# Patient Record
Sex: MALE | Race: BLACK OR AFRICAN AMERICAN | NOT HISPANIC OR LATINO | Employment: FULL TIME | URBAN - METROPOLITAN AREA
[De-identification: names, ages, dates, MRNs, and addresses within clinical notes are randomized per-mention and may not be internally consistent; named-entity substitution may affect disease eponyms.]

---

## 2019-03-18 ENCOUNTER — OFFICE VISIT (OUTPATIENT)
Dept: INTERNAL MEDICINE | Facility: CLINIC | Age: 29
End: 2019-03-18
Payer: COMMERCIAL

## 2019-03-18 VITALS
WEIGHT: 181.69 LBS | DIASTOLIC BLOOD PRESSURE: 76 MMHG | HEART RATE: 60 BPM | SYSTOLIC BLOOD PRESSURE: 132 MMHG | OXYGEN SATURATION: 98 % | TEMPERATURE: 98 F

## 2019-03-18 DIAGNOSIS — E83.52 HYPERCALCEMIA: ICD-10-CM

## 2019-03-18 DIAGNOSIS — B00.89 HERPES SIMPLEX VIRUS TYPE 1 (HSV-1) DERMATITIS: ICD-10-CM

## 2019-03-18 DIAGNOSIS — R07.9 CHEST PAIN, EXERTIONAL: ICD-10-CM

## 2019-03-18 DIAGNOSIS — Z23 NEED FOR TDAP VACCINATION: ICD-10-CM

## 2019-03-18 DIAGNOSIS — Z13.220 SCREENING FOR LIPOID DISORDERS: ICD-10-CM

## 2019-03-18 DIAGNOSIS — D64.9 ANEMIA, UNSPECIFIED TYPE: ICD-10-CM

## 2019-03-18 DIAGNOSIS — Z13.29 THYROID DISORDER SCREEN: ICD-10-CM

## 2019-03-18 DIAGNOSIS — Z00.00 ROUTINE GENERAL MEDICAL EXAMINATION AT A HEALTH CARE FACILITY: Primary | ICD-10-CM

## 2019-03-18 PROCEDURE — 90715 TDAP VACCINE 7 YRS/> IM: CPT | Mod: S$GLB,,, | Performed by: FAMILY MEDICINE

## 2019-03-18 PROCEDURE — 99385 PREV VISIT NEW AGE 18-39: CPT | Mod: 25,S$GLB,, | Performed by: FAMILY MEDICINE

## 2019-03-18 PROCEDURE — 90686 FLU VACCINE (QUAD) GREATER THAN OR EQUAL TO 3YO PRESERVATIVE FREE IM: ICD-10-PCS | Mod: S$GLB,,, | Performed by: FAMILY MEDICINE

## 2019-03-18 PROCEDURE — 90471 FLU VACCINE (QUAD) GREATER THAN OR EQUAL TO 3YO PRESERVATIVE FREE IM: ICD-10-PCS | Mod: S$GLB,,, | Performed by: FAMILY MEDICINE

## 2019-03-18 PROCEDURE — 90472 IMMUNIZATION ADMIN EACH ADD: CPT | Mod: S$GLB,,, | Performed by: FAMILY MEDICINE

## 2019-03-18 PROCEDURE — 99385 PR PREVENTIVE VISIT,NEW,18-39: ICD-10-PCS | Mod: 25,S$GLB,, | Performed by: FAMILY MEDICINE

## 2019-03-18 PROCEDURE — 99999 PR PBB SHADOW E&M-NEW PATIENT-LVL III: ICD-10-PCS | Mod: PBBFAC,,, | Performed by: FAMILY MEDICINE

## 2019-03-18 PROCEDURE — 99999 PR PBB SHADOW E&M-NEW PATIENT-LVL III: CPT | Mod: PBBFAC,,, | Performed by: FAMILY MEDICINE

## 2019-03-18 PROCEDURE — 90472 TDAP VACCINE GREATER THAN OR EQUAL TO 7YO IM: ICD-10-PCS | Mod: S$GLB,,, | Performed by: FAMILY MEDICINE

## 2019-03-18 PROCEDURE — 90715 TDAP VACCINE GREATER THAN OR EQUAL TO 7YO IM: ICD-10-PCS | Mod: S$GLB,,, | Performed by: FAMILY MEDICINE

## 2019-03-18 PROCEDURE — 90686 IIV4 VACC NO PRSV 0.5 ML IM: CPT | Mod: S$GLB,,, | Performed by: FAMILY MEDICINE

## 2019-03-18 PROCEDURE — 90471 IMMUNIZATION ADMIN: CPT | Mod: S$GLB,,, | Performed by: FAMILY MEDICINE

## 2019-03-18 RX ORDER — VALACYCLOVIR HYDROCHLORIDE 500 MG/1
500 TABLET, FILM COATED ORAL 2 TIMES DAILY
Qty: 6 TABLET | Refills: 2 | Status: SHIPPED | OUTPATIENT
Start: 2019-03-18 | End: 2022-02-03

## 2019-03-18 RX ORDER — FERROUS SULFATE 324(65)MG
325 TABLET, DELAYED RELEASE (ENTERIC COATED) ORAL DAILY
COMMUNITY
End: 2019-03-20

## 2019-03-18 NOTE — PROGRESS NOTES
Subjective:       Patient ID: Danisha Jones is a 28 y.o. male.    Chief Complaint: Establish Care and Rash    Patient presents to clinic today for establish care physical. He reports occasional sleep paralysis on waking since he was a child.     Review of Systems   Constitutional: Negative for activity change and unexpected weight change.   HENT: Negative for hearing loss, rhinorrhea and trouble swallowing.    Eyes: Negative for discharge and visual disturbance.   Respiratory: Negative for chest tightness and wheezing.    Cardiovascular: Positive for chest pain (reports with significant exertion; describes as a pinching). Negative for palpitations.   Gastrointestinal: Negative for blood in stool, constipation, diarrhea and vomiting.   Endocrine: Negative for polydipsia and polyuria.   Genitourinary: Negative for difficulty urinating, hematuria and urgency.   Musculoskeletal: Positive for arthralgias (intermittent; left shoulder pain with lifting) and neck pain (sometimes). Negative for joint swelling.   Skin: Positive for rash (has history of hsv rash on left side).   Neurological: Negative for weakness and headaches.   Psychiatric/Behavioral: Positive for dysphoric mood (situational; temporary). Negative for confusion.       Objective:      Physical Exam   Constitutional: He is oriented to person, place, and time. He appears well-developed and well-nourished. No distress.   HENT:   Head: Normocephalic and atraumatic.   Right Ear: Hearing, tympanic membrane, external ear and ear canal normal.   Left Ear: Hearing, tympanic membrane, external ear and ear canal normal.   Nose: Nose normal.   Mouth/Throat: Uvula is midline, oropharynx is clear and moist and mucous membranes are normal.   Eyes: Pupils are equal, round, and reactive to light. Conjunctivae, EOM and lids are normal. No scleral icterus.   Neck: Normal range of motion. Neck supple. No thyromegaly present.   Cardiovascular: Normal rate and regular rhythm.  Exam reveals no gallop and no friction rub.   No murmur heard.  Pulmonary/Chest: Effort normal and breath sounds normal. He has no wheezes. He has no rales.   Abdominal: Soft. Bowel sounds are normal. He exhibits no distension and no mass. There is no hepatosplenomegaly. There is no tenderness.   Musculoskeletal: Normal range of motion. He exhibits no edema or tenderness.   Lymphadenopathy:     He has no cervical adenopathy.   Neurological: He is alert and oriented to person, place, and time. No cranial nerve deficit. Coordination normal.   Reflex Scores:       Patellar reflexes are 2+ on the right side and 2+ on the left side.  Skin: Skin is warm and dry. No rash noted.   Psychiatric: He has a normal mood and affect.   Vitals reviewed.      Assessment:       1. Routine general medical examination at a health care facility    2. Chest pain, exertional    3. Herpes simplex virus type 1 (HSV-1) dermatitis    4. Need for Tdap vaccination    5. Screening for lipoid disorders    6. Thyroid disorder screen        Plan:     Problem List Items Addressed This Visit     Herpes simplex virus type 1 (HSV-1) dermatitis    Current Assessment & Plan     Valtrex prn         Chest pain, exertional    Relevant Orders    Ambulatory referral to Cardiology      Other Visit Diagnoses     Routine general medical examination at a health care facility    -  Primary    Relevant Orders    Comprehensive metabolic panel (Completed)    CBC auto differential (Completed)    Need for Tdap vaccination        Relevant Orders    Tdap Vaccine (Completed)    Screening for lipoid disorders        Relevant Orders    Lipid panel (Completed)    Thyroid disorder screen        Relevant Orders    TSH (Completed)          Health Maintenance reviewed/updated.

## 2019-03-20 ENCOUNTER — CLINICAL SUPPORT (OUTPATIENT)
Dept: CARDIOLOGY | Facility: CLINIC | Age: 29
End: 2019-03-20
Payer: COMMERCIAL

## 2019-03-20 ENCOUNTER — HOSPITAL ENCOUNTER (OUTPATIENT)
Dept: RADIOLOGY | Facility: HOSPITAL | Age: 29
Discharge: HOME | End: 2019-03-20
Attending: INTERNAL MEDICINE
Payer: COMMERCIAL

## 2019-03-20 ENCOUNTER — OFFICE VISIT (OUTPATIENT)
Dept: CARDIOLOGY | Facility: CLINIC | Age: 29
End: 2019-03-20
Payer: COMMERCIAL

## 2019-03-20 VITALS
BODY MASS INDEX: 24.52 KG/M2 | SYSTOLIC BLOOD PRESSURE: 146 MMHG | HEIGHT: 72 IN | DIASTOLIC BLOOD PRESSURE: 80 MMHG | HEART RATE: 60 BPM | WEIGHT: 181 LBS

## 2019-03-20 DIAGNOSIS — R07.89 OTHER CHEST PAIN: Primary | ICD-10-CM

## 2019-03-20 DIAGNOSIS — R07.89 OTHER CHEST PAIN: ICD-10-CM

## 2019-03-20 DIAGNOSIS — R07.9 CHEST PAIN, UNSPECIFIED TYPE: Primary | ICD-10-CM

## 2019-03-20 DIAGNOSIS — R07.9 CHEST PAIN, UNSPECIFIED TYPE: ICD-10-CM

## 2019-03-20 PROCEDURE — 71046 X-RAY EXAM CHEST 2 VIEWS: CPT | Mod: 26,,, | Performed by: RADIOLOGY

## 2019-03-20 PROCEDURE — 99999 PR PBB SHADOW E&M-EST. PATIENT-LVL III: CPT | Mod: PBBFAC,,, | Performed by: INTERNAL MEDICINE

## 2019-03-20 PROCEDURE — 71046 X-RAY EXAM CHEST 2 VIEWS: CPT | Mod: TC

## 2019-03-20 PROCEDURE — 93000 EKG 12-LEAD: ICD-10-PCS | Mod: S$GLB,,, | Performed by: INTERNAL MEDICINE

## 2019-03-20 PROCEDURE — 93000 ELECTROCARDIOGRAM COMPLETE: CPT | Mod: S$GLB,,, | Performed by: INTERNAL MEDICINE

## 2019-03-20 PROCEDURE — 99244 OFF/OP CNSLTJ NEW/EST MOD 40: CPT | Mod: S$GLB,,, | Performed by: INTERNAL MEDICINE

## 2019-03-20 PROCEDURE — 71046 XR CHEST PA AND LATERAL: ICD-10-PCS | Mod: 26,,, | Performed by: RADIOLOGY

## 2019-03-20 PROCEDURE — 99999 PR PBB SHADOW E&M-EST. PATIENT-LVL III: ICD-10-PCS | Mod: PBBFAC,,, | Performed by: INTERNAL MEDICINE

## 2019-03-20 PROCEDURE — 99244 PR OFFICE CONSULTATION,LEVEL IV: ICD-10-PCS | Mod: S$GLB,,, | Performed by: INTERNAL MEDICINE

## 2019-03-20 RX ORDER — IBUPROFEN 600 MG/1
600 TABLET ORAL EVERY 6 HOURS PRN
Qty: 60 TABLET | Refills: 0 | Status: SHIPPED | OUTPATIENT
Start: 2019-03-20 | End: 2019-07-29

## 2019-03-20 NOTE — LETTER
March 20, 2019      Stephanie Scott MD  [id number] [place] [address] [place] [address]           [facility]  [address]  [place] [address]  Phone: [phone]  Fax: [phone]          Patient: Danisha Jones   MR Number: [id number]   YOB: 1990   Date of Visit: 3/20/2019       Dear Dr. Stephanie Scott:    Thank you for referring Danisha Jones to me for evaluation. Attached you will find relevant portions of my assessment and plan of care.    If you have questions, please do not hesitate to call me. I look forward to following Danisha Jones along with you.    Sincerely,    Ba Bermudez MD    Enclosure  CC:  No Recipients    If you would like to receive this communication electronically, please contact [email or web address] or [phone] to request more information on [employer] access.    For providers and/or their staff who would like to refer a patient to [facility], please contact us through our one-stop-shop provider referral line, [facility], at [phone].    If you feel you have received this communication in error or would no longer like to receive these types of communications, please e-mail [email or web address]

## 2019-03-20 NOTE — PATIENT INSTRUCTIONS
Noncardiac Chest Pain    Based on your visit today, the healthcare provider doesnt know what is causing your chest pain. In most cases, people who come to the emergency department with chest pain dont have a problem with their heart. Instead, the pain is caused by other conditions. It's important for the healthcare team to be sure you are not having a life threatening cause for chest pain such as a heart attack, blood clot in the lungs, collapsed lung, ruptured esophagus, or tearing of the aorta. Once these major causes have been ruled out, you may have further evaluation for non-heart causes of chest pain. These may be problems with the lungs, muscles, bones, digestive tract, nerves, or mental health.  Lung problems  · Inflammation around the lungs (pleurisy)  · Collapsed lung (pneumothorax)  · Fluid around the lungs (pleural effusion)  · Lung cancer (a rare cause of chest pain)  Muscle or bone problems  · Inflamed cartilage between the ribs (costochondritis)  · Fibromyalgia  · Rheumatoid arthritis  · Chest wall strain  Digestive system problems  · Reflux  · Stomach ulcer  · Spasms of the esophagus  · Gall stones  · Gallbladder inflammation  Mental health conditions  · Panic or anxiety attacks  · Emotional distress  Your condition doesnt seem serious and your pain doesnt appear to be coming from your heart. But sometimes the signs of a serious problem take more time to appear. Watch for the warning signs listed below.  Home care  Follow these guidelines when caring for yourself at home:  · Rest today and avoid strenuous activity.  · Take any prescribed medicine as directed.  Follow-up care  Follow up with your healthcare provider, or as advised, if you dont start to feel better within 24 hours.  When to seek medical advice  Call your healthcare provider right away if any of these occur:  · A change in the type of pain. Call if it feels different, becomes more serious, lasts longer, or begins to spread into  your shoulder, arm, neck, jaw, or back.  · Shortness of breath  · You feel more pain when you breathe  · Cough with dark-colored mucus or blood  · Weakness, dizziness, or fainting  · Fever of 100.4ºF (38ºC) or higher, or as directed by your healthcare provider  · Swelling, pain, or redness in one leg  Date Last Reviewed: 12/1/2016  © 6817-1157 [employer]. [address] [place] [address], [place] [address]. All rights reserved. This information is not intended as a substitute for professional medical care. Always follow your healthcare professional's instructions.

## 2019-03-20 NOTE — PROGRESS NOTES
Subjective:   Patient ID:  Danisha Jones is a 28 y.o. male who presents for cardiac consult of Chest Pain (consult)      HPI  The patient came in today for cardiac consult of Chest Pain (consult)    Referring Physician: Stephanie Scott MD  Reason for consult: CP    3/20/19  Danisha Jones is a 28 y.o. male without significant PMHx presents for initial CV evaluation of chest pain.     He presented to Dr. Scott's office two days ago and complained of chest pain. He complains of exertional CP, started last year occurs 5 x week.   About the same as in the past feels like a pinching type. Occurs when he does free weights, does not feel with treadmill. Positional movements increase pain.     Patient feels no sob, no leg swelling, no PND, no palpitation, no dizziness, no syncope, no CNS symptoms.    Patient has fairly good exercise tolerance. Works as lab tech.     Patient is compliant with medications.    FH - no CV disease    ECG - NSR    Past Medical History:   Diagnosis Date    Anemia     Herpes simplex virus type 1 (HSV-1) dermatitis        History reviewed. No pertinent surgical history.    Social History     Tobacco Use    Smoking status: Never Smoker    Smokeless tobacco: Never Used   Substance Use Topics    Alcohol use: Yes     Frequency: 2-4 times a month     Drinks per session: 3 or 4     Binge frequency: Less than monthly    Drug use: No       Family History   Problem Relation Age of Onset    Anemia Mother     Diabetes Father     Hypertension Father     Anemia Sister     No Known Problems Brother     No Known Problems Sister        Patient's Medications   New Prescriptions    IBUPROFEN (ADVIL,MOTRIN) 600 MG TABLET    Take 1 tablet (600 mg total) by mouth every 6 (six) hours as needed for Other (Chest pain).   Previous Medications    VALACYCLOVIR (VALTREX) 500 MG TABLET    Take 1 tablet (500 mg total) by mouth 2 (two) times daily. Take 3 day course as needed. for 3 days   Modified  Medications    No medications on file   Discontinued Medications    FERROUS SULFATE 324 MG (65 MG IRON) TBEC    Take 325 mg by mouth once daily.       Review of Systems   Constitutional: Negative.    HENT: Negative.    Eyes: Negative.    Respiratory: Negative.    Cardiovascular: Positive for chest pain.   Gastrointestinal: Negative.    Genitourinary: Negative.    Musculoskeletal: Negative.    Skin: Negative.    Neurological: Negative.    Endo/Heme/Allergies: Negative.    Psychiatric/Behavioral: Negative.    All 12 systems otherwise negative.      Wt Readings from Last 3 Encounters:   03/20/19 82.1 kg (181 lb)   03/18/19 82.4 kg (181 lb 10.5 oz)     Temp Readings from Last 3 Encounters:   03/18/19 97.6 °F (36.4 °C) (Tympanic)     BP Readings from Last 3 Encounters:   03/20/19 (!) 146/80   03/18/19 132/76     Pulse Readings from Last 3 Encounters:   03/20/19 60   03/18/19 60       BP (!) 146/80 (BP Method: Large (Manual))   Pulse 60   Ht 6' (1.829 m)   Wt 82.1 kg (181 lb)   BMI 24.55 kg/m²     Objective:   Physical Exam   Constitutional: He is oriented to person, place, and time. He appears well-developed and well-nourished. No distress.   HENT:   Head: Normocephalic and atraumatic.   Nose: Nose normal.   Mouth/Throat: Oropharynx is clear and moist.   Eyes: Conjunctivae and EOM are normal. No scleral icterus.   Neck: Normal range of motion. Neck supple. No JVD present. No thyromegaly present.   Cardiovascular: Normal rate, regular rhythm, S1 normal and S2 normal. Exam reveals no gallop, no S3, no S4 and no friction rub.   No murmur heard.  Pulmonary/Chest: Effort normal and breath sounds normal. No stridor. No respiratory distress. He has no wheezes. He has no rales. He exhibits no tenderness.   Abdominal: Soft. Bowel sounds are normal. He exhibits no distension and no mass. There is no tenderness. There is no rebound.   Genitourinary:   Genitourinary Comments: Deferred   Musculoskeletal: Normal range of motion.  He exhibits no edema, tenderness or deformity.   Lymphadenopathy:     He has no cervical adenopathy.   Neurological: He is alert and oriented to person, place, and time. He exhibits normal muscle tone. Coordination normal.   Skin: Skin is warm and dry. No rash noted. He is not diaphoretic. No erythema. No pallor.   Psychiatric: He has a normal mood and affect. His behavior is normal. Judgment and thought content normal.   Nursing note and vitals reviewed.      No results found for: NA, K, CL, CO2, BUN, CREATININE, GLU, HGBA1C, MG, AST, ALT, ALBUMIN, PROT, BILITOT, WBC, HGB, HCT, MCV, PLT, INR, TSH, CHOL, HDL, LDLCALC, TRIG, BNP  Assessment:      1. Other chest pain        Plan:   1. Chest pain, atypical  - 2D ECHO to eval pericardial disease/valves  - discussed may be due to costochondritis - NSAIDS PRN  - if symptoms worse needs more urgent eval  - CXR ordered   - stress test if echo abnormal or exertional symptoms occur with walking      2. Elevated BP  - monitor at home  - decrease salt intake    Thank you for allowing me to participate in this patient's care. Please do not hesitate to contact me with any questions or concerns. Consult note has been forwarded to the referral physician.

## 2019-03-22 ENCOUNTER — LAB VISIT (OUTPATIENT)
Dept: LAB | Facility: HOSPITAL | Age: 29
End: 2019-03-22
Payer: COMMERCIAL

## 2019-03-22 DIAGNOSIS — Z13.220 SCREENING FOR LIPOID DISORDERS: ICD-10-CM

## 2019-03-22 DIAGNOSIS — Z00.00 ROUTINE GENERAL MEDICAL EXAMINATION AT A HEALTH CARE FACILITY: ICD-10-CM

## 2019-03-22 DIAGNOSIS — Z13.29 THYROID DISORDER SCREEN: ICD-10-CM

## 2019-03-22 LAB
ALBUMIN SERPL BCP-MCNC: 4.5 G/DL
ALP SERPL-CCNC: 47 U/L
ALT SERPL W/O P-5'-P-CCNC: 20 U/L
ANION GAP SERPL CALC-SCNC: 6 MMOL/L
AST SERPL-CCNC: 27 U/L
BASOPHILS # BLD AUTO: 0.01 K/UL
BASOPHILS NFR BLD: 0.2 %
BILIRUB SERPL-MCNC: 0.4 MG/DL
BUN SERPL-MCNC: 17 MG/DL
CALCIUM SERPL-MCNC: 10.7 MG/DL
CHLORIDE SERPL-SCNC: 102 MMOL/L
CHOLEST SERPL-MCNC: 154 MG/DL
CHOLEST/HDLC SERPL: 2.2 {RATIO}
CO2 SERPL-SCNC: 29 MMOL/L
CREAT SERPL-MCNC: 1.2 MG/DL
DIFFERENTIAL METHOD: ABNORMAL
EOSINOPHIL # BLD AUTO: 0 K/UL
EOSINOPHIL NFR BLD: 0.4 %
ERYTHROCYTE [DISTWIDTH] IN BLOOD BY AUTOMATED COUNT: 12.6 %
EST. GFR  (AFRICAN AMERICAN): >60 ML/MIN/1.73 M^2
EST. GFR  (NON AFRICAN AMERICAN): >60 ML/MIN/1.73 M^2
GLUCOSE SERPL-MCNC: 82 MG/DL
HCT VFR BLD AUTO: 42 %
HDLC SERPL-MCNC: 70 MG/DL
HDLC SERPL: 45.5 %
HGB BLD-MCNC: 12.6 G/DL
IMM GRANULOCYTES # BLD AUTO: 0.01 K/UL
IMM GRANULOCYTES NFR BLD AUTO: 0.2 %
LDLC SERPL CALC-MCNC: 76.4 MG/DL
LYMPHOCYTES # BLD AUTO: 1.6 K/UL
LYMPHOCYTES NFR BLD: 29.2 %
MCH RBC QN AUTO: 26.4 PG
MCHC RBC AUTO-ENTMCNC: 30 G/DL
MCV RBC AUTO: 88 FL
MONOCYTES # BLD AUTO: 0.4 K/UL
MONOCYTES NFR BLD: 6.4 %
NEUTROPHILS # BLD AUTO: 3.5 K/UL
NEUTROPHILS NFR BLD: 63.6 %
NONHDLC SERPL-MCNC: 84 MG/DL
NRBC BLD-RTO: 0 /100 WBC
PLATELET # BLD AUTO: 225 K/UL
PMV BLD AUTO: 10.3 FL
POTASSIUM SERPL-SCNC: 4.3 MMOL/L
PROT SERPL-MCNC: 7.7 G/DL
RBC # BLD AUTO: 4.77 M/UL
SODIUM SERPL-SCNC: 137 MMOL/L
TRIGL SERPL-MCNC: 38 MG/DL
TSH SERPL DL<=0.005 MIU/L-ACNC: 0.8 UIU/ML
WBC # BLD AUTO: 5.51 K/UL

## 2019-03-22 PROCEDURE — 80061 LIPID PANEL: CPT

## 2019-03-22 PROCEDURE — 80053 COMPREHEN METABOLIC PANEL: CPT

## 2019-03-22 PROCEDURE — 85025 COMPLETE CBC W/AUTO DIFF WBC: CPT

## 2019-03-22 PROCEDURE — 84443 ASSAY THYROID STIM HORMONE: CPT

## 2019-03-22 PROCEDURE — 36415 COLL VENOUS BLD VENIPUNCTURE: CPT

## 2019-03-24 PROBLEM — R07.9 CHEST PAIN, EXERTIONAL: Status: ACTIVE | Noted: 2019-03-24

## 2019-03-24 PROBLEM — B00.89 HERPES SIMPLEX VIRUS TYPE 1 (HSV-1) DERMATITIS: Status: ACTIVE | Noted: 2019-03-24

## 2019-03-29 ENCOUNTER — LAB VISIT (OUTPATIENT)
Dept: LAB | Facility: HOSPITAL | Age: 29
End: 2019-03-29
Attending: FAMILY MEDICINE
Payer: COMMERCIAL

## 2019-03-29 ENCOUNTER — CLINICAL SUPPORT (OUTPATIENT)
Dept: CARDIOLOGY | Facility: CLINIC | Age: 29
End: 2019-03-29
Attending: INTERNAL MEDICINE
Payer: COMMERCIAL

## 2019-03-29 DIAGNOSIS — D64.9 ANEMIA, UNSPECIFIED TYPE: ICD-10-CM

## 2019-03-29 DIAGNOSIS — R07.89 OTHER CHEST PAIN: ICD-10-CM

## 2019-03-29 DIAGNOSIS — E83.52 HYPERCALCEMIA: ICD-10-CM

## 2019-03-29 LAB
25(OH)D3+25(OH)D2 SERPL-MCNC: 16 NG/ML (ref 30–96)
CALCIUM SERPL-MCNC: 10.2 MG/DL (ref 8.7–10.5)
FERRITIN SERPL-MCNC: 123 NG/ML (ref 20–300)
FOLATE SERPL-MCNC: 9.5 NG/ML (ref 4–24)
IRON SERPL-MCNC: 97 UG/DL (ref 45–160)
PTH-INTACT SERPL-MCNC: 60 PG/ML (ref 9–77)
SATURATED IRON: 24 % (ref 20–50)
TOTAL IRON BINDING CAPACITY: 411 UG/DL (ref 250–450)
TRANSFERRIN SERPL-MCNC: 278 MG/DL (ref 200–375)
VIT B12 SERPL-MCNC: 593 PG/ML (ref 210–950)

## 2019-03-29 PROCEDURE — 83970 ASSAY OF PARATHORMONE: CPT

## 2019-03-29 PROCEDURE — 36415 COLL VENOUS BLD VENIPUNCTURE: CPT

## 2019-03-29 PROCEDURE — 82607 VITAMIN B-12: CPT

## 2019-03-29 PROCEDURE — 82746 ASSAY OF FOLIC ACID SERUM: CPT

## 2019-03-29 PROCEDURE — 82310 ASSAY OF CALCIUM: CPT

## 2019-03-29 PROCEDURE — 83540 ASSAY OF IRON: CPT

## 2019-03-29 PROCEDURE — 82306 VITAMIN D 25 HYDROXY: CPT

## 2019-03-29 PROCEDURE — 82728 ASSAY OF FERRITIN: CPT

## 2019-03-29 PROCEDURE — 93306 TTE W/DOPPLER COMPLETE: CPT | Mod: S$GLB,,, | Performed by: INTERNAL MEDICINE

## 2019-03-29 PROCEDURE — 93306 2D ECHO WITH COLOR FLOW DOPPLER: ICD-10-PCS | Mod: S$GLB,,, | Performed by: INTERNAL MEDICINE

## 2019-03-30 LAB
DIASTOLIC DYSFUNCTION: NO
ESTIMATED PA SYSTOLIC PRESSURE: 33.2
RETIRED EF AND QEF - SEE NOTES: 55 (ref 55–65)
TRICUSPID VALVE REGURGITATION: NORMAL

## 2019-04-02 ENCOUNTER — TELEPHONE (OUTPATIENT)
Dept: CARDIOLOGY | Facility: CLINIC | Age: 29
End: 2019-04-02

## 2019-04-03 ENCOUNTER — TELEPHONE (OUTPATIENT)
Dept: CARDIOLOGY | Facility: CLINIC | Age: 29
End: 2019-04-03

## 2019-04-03 NOTE — TELEPHONE ENCOUNTER
Patient returned call and notified of the following, Echo results - normal.    Patient voiced no questions or concerns.

## 2019-04-03 NOTE — TELEPHONE ENCOUNTER
----- Message from Tiffanie Mesa sent at 4/3/2019 12:31 PM CDT -----  Contact: self [phone]  Type:  Patient Returning Call    Who Called:Danisha Jones  Who Left Message for Patient:Leatha  Does the patient know what this is regarding?: test results  Would the patient rather a call back or a response via [facility]? Call back  Best Call Back Number:[phone]  Additional Information:

## 2019-05-08 DIAGNOSIS — E55.9 VITAMIN D DEFICIENCY: Primary | ICD-10-CM

## 2019-05-08 RX ORDER — VIT C/E/ZN/COPPR/LUTEIN/ZEAXAN 250MG-90MG
1000 CAPSULE ORAL DAILY
Refills: 0 | COMMUNITY
Start: 2019-05-08 | End: 2022-02-03

## 2019-05-21 ENCOUNTER — OFFICE VISIT (OUTPATIENT)
Dept: INTERNAL MEDICINE | Facility: CLINIC | Age: 29
End: 2019-05-21
Payer: COMMERCIAL

## 2019-05-21 ENCOUNTER — LAB VISIT (OUTPATIENT)
Dept: LAB | Facility: HOSPITAL | Age: 29
End: 2019-05-21
Attending: INTERNAL MEDICINE
Payer: COMMERCIAL

## 2019-05-21 VITALS
SYSTOLIC BLOOD PRESSURE: 128 MMHG | HEART RATE: 56 BPM | WEIGHT: 182.31 LBS | OXYGEN SATURATION: 99 % | HEIGHT: 72 IN | TEMPERATURE: 97 F | BODY MASS INDEX: 24.69 KG/M2 | DIASTOLIC BLOOD PRESSURE: 86 MMHG

## 2019-05-21 DIAGNOSIS — G57.90 NEURITIS OF FOOT, UNSPECIFIED LATERALITY: ICD-10-CM

## 2019-05-21 DIAGNOSIS — G57.90 NEURITIS OF FOOT, UNSPECIFIED LATERALITY: Primary | ICD-10-CM

## 2019-05-21 LAB
ALBUMIN SERPL BCP-MCNC: 4.4 G/DL (ref 3.5–5.2)
ALP SERPL-CCNC: 41 U/L (ref 55–135)
ALT SERPL W/O P-5'-P-CCNC: 19 U/L (ref 10–44)
ANION GAP SERPL CALC-SCNC: 10 MMOL/L (ref 8–16)
AST SERPL-CCNC: 23 U/L (ref 10–40)
BILIRUB SERPL-MCNC: 0.5 MG/DL (ref 0.1–1)
BUN SERPL-MCNC: 21 MG/DL (ref 6–20)
CALCIUM SERPL-MCNC: 10.2 MG/DL (ref 8.7–10.5)
CHLORIDE SERPL-SCNC: 105 MMOL/L (ref 95–110)
CO2 SERPL-SCNC: 27 MMOL/L (ref 23–29)
CREAT SERPL-MCNC: 1.2 MG/DL (ref 0.5–1.4)
ERYTHROCYTE [SEDIMENTATION RATE] IN BLOOD BY WESTERGREN METHOD: 1 MM/HR (ref 0–10)
EST. GFR  (AFRICAN AMERICAN): >60 ML/MIN/1.73 M^2
EST. GFR  (NON AFRICAN AMERICAN): >60 ML/MIN/1.73 M^2
GLUCOSE SERPL-MCNC: 103 MG/DL (ref 70–110)
GLUCOSE SERPL-MCNC: 91 MG/DL (ref 70–110)
POTASSIUM SERPL-SCNC: 4.5 MMOL/L (ref 3.5–5.1)
PROT SERPL-MCNC: 7.5 G/DL (ref 6–8.4)
SODIUM SERPL-SCNC: 142 MMOL/L (ref 136–145)
VIT B12 SERPL-MCNC: 318 PG/ML (ref 210–950)

## 2019-05-21 PROCEDURE — 99999 PR PBB SHADOW E&M-EST. PATIENT-LVL III: ICD-10-PCS | Mod: PBBFAC,,, | Performed by: PHYSICIAN ASSISTANT

## 2019-05-21 PROCEDURE — 99214 OFFICE O/P EST MOD 30 MIN: CPT | Mod: S$GLB,,, | Performed by: PHYSICIAN ASSISTANT

## 2019-05-21 PROCEDURE — 36415 COLL VENOUS BLD VENIPUNCTURE: CPT

## 2019-05-21 PROCEDURE — 82962 GLUCOSE BLOOD TEST: CPT | Mod: S$GLB,,, | Performed by: PHYSICIAN ASSISTANT

## 2019-05-21 PROCEDURE — 3008F BODY MASS INDEX DOCD: CPT | Mod: CPTII,S$GLB,, | Performed by: PHYSICIAN ASSISTANT

## 2019-05-21 PROCEDURE — 99999 PR PBB SHADOW E&M-EST. PATIENT-LVL III: CPT | Mod: PBBFAC,,, | Performed by: PHYSICIAN ASSISTANT

## 2019-05-21 PROCEDURE — 3008F PR BODY MASS INDEX (BMI) DOCUMENTED: ICD-10-PCS | Mod: CPTII,S$GLB,, | Performed by: PHYSICIAN ASSISTANT

## 2019-05-21 PROCEDURE — 85651 RBC SED RATE NONAUTOMATED: CPT

## 2019-05-21 PROCEDURE — 99214 PR OFFICE/OUTPT VISIT, EST, LEVL IV, 30-39 MIN: ICD-10-PCS | Mod: S$GLB,,, | Performed by: PHYSICIAN ASSISTANT

## 2019-05-21 PROCEDURE — 80053 COMPREHEN METABOLIC PANEL: CPT

## 2019-05-21 PROCEDURE — 82607 VITAMIN B-12: CPT

## 2019-05-21 PROCEDURE — 82962 POCT GLUCOSE, HAND-HELD DEVICE: ICD-10-PCS | Mod: S$GLB,,, | Performed by: PHYSICIAN ASSISTANT

## 2019-05-21 RX ORDER — PREDNISONE 10 MG/1
TABLET ORAL
Qty: 18 TABLET | Refills: 0 | Status: SHIPPED | OUTPATIENT
Start: 2019-05-21 | End: 2019-06-21 | Stop reason: ALTCHOICE

## 2019-05-21 NOTE — PROGRESS NOTES
Subjective:       Patient ID: Danisha Jones is a 28 y.o. male.    Chief Complaint: tingling in feet (PCP - Sonia)    Foot Injury    Incident onset: walked in sand over the weekend. There was no injury mechanism. The pain is present in the right toes and left toes. Quality: feels like that are sleep. The pain is mild. The pain has been constant since onset. Associated symptoms include numbness and tingling. Pertinent negatives include no inability to bear weight, loss of motion, loss of sensation or muscle weakness. He reports no foreign bodies present. Nothing aggravates the symptoms. He has tried nothing for the symptoms.     Past Medical History:   Diagnosis Date    Anemia     Herpes simplex virus type 1 (HSV-1) dermatitis        Review of Systems   Constitutional: Negative for chills, fatigue and fever.   Respiratory: Negative for chest tightness and shortness of breath.    Cardiovascular: Negative for chest pain.   Gastrointestinal: Negative for abdominal pain.   Neurological: Positive for tingling and numbness.       Objective:      Physical Exam   Constitutional: He appears well-developed and well-nourished. No distress.   Cardiovascular: Normal rate and regular rhythm.   Pulmonary/Chest: Effort normal and breath sounds normal.   Abdominal: Soft. Bowel sounds are normal.   Skin: He is not diaphoretic.   Nursing note and vitals reviewed.      Assessment:       1. Neuritis of foot, unspecified laterality        Plan:       Neuritis of foot, unspecified laterality  -     POCT Glucose, Hand-Held Device  -     Comprehensive metabolic panel; Future; Expected date: 05/21/2019  -     Vitamin B12; Future; Expected date: 05/21/2019  -     Sedimentation rate; Future; Expected date: 05/21/2019    Other orders  -     predniSONE (DELTASONE) 10 MG tablet; Take 3 daily for 3 days, then 2 daily for three days, then 1 daily for three days.  Dispense: 18 tablet; Refill: 0

## 2019-06-21 ENCOUNTER — OFFICE VISIT (OUTPATIENT)
Dept: CARDIOLOGY | Facility: CLINIC | Age: 29
End: 2019-06-21
Payer: COMMERCIAL

## 2019-06-21 VITALS
WEIGHT: 186.31 LBS | HEART RATE: 83 BPM | BODY MASS INDEX: 25.24 KG/M2 | SYSTOLIC BLOOD PRESSURE: 130 MMHG | DIASTOLIC BLOOD PRESSURE: 72 MMHG | HEIGHT: 72 IN

## 2019-06-21 DIAGNOSIS — R07.9 CHEST PAIN, EXERTIONAL: Primary | ICD-10-CM

## 2019-06-21 PROCEDURE — 3008F PR BODY MASS INDEX (BMI) DOCUMENTED: ICD-10-PCS | Mod: CPTII,S$GLB,, | Performed by: INTERNAL MEDICINE

## 2019-06-21 PROCEDURE — 99999 PR PBB SHADOW E&M-EST. PATIENT-LVL III: ICD-10-PCS | Mod: PBBFAC,,, | Performed by: INTERNAL MEDICINE

## 2019-06-21 PROCEDURE — 3008F BODY MASS INDEX DOCD: CPT | Mod: CPTII,S$GLB,, | Performed by: INTERNAL MEDICINE

## 2019-06-21 PROCEDURE — 99214 OFFICE O/P EST MOD 30 MIN: CPT | Mod: S$GLB,,, | Performed by: INTERNAL MEDICINE

## 2019-06-21 PROCEDURE — 99214 PR OFFICE/OUTPT VISIT, EST, LEVL IV, 30-39 MIN: ICD-10-PCS | Mod: S$GLB,,, | Performed by: INTERNAL MEDICINE

## 2019-06-21 PROCEDURE — 99999 PR PBB SHADOW E&M-EST. PATIENT-LVL III: CPT | Mod: PBBFAC,,, | Performed by: INTERNAL MEDICINE

## 2019-06-21 NOTE — PROGRESS NOTES
Subjective:   Patient ID:  Danisha Jones is a 28 y.o. male who presents for cardiac consult of follow for chest pain      HPI  The patient came in today for cardiac consult of follow for chest pain    Referring Physician: Stephanie Scott MD  Reason for consult: CP      Danisha Jones is a 28 y.o. male without significant PMHx presents for follow up CV evaluation of chest pain.     3/20/19  He presented to Dr. Scott's office two days ago and complained of chest pain. He complains of exertional CP, started last year occurs 5 x week.   About the same as in the past feels like a pinching type. Occurs when he does free weights, does not feel with treadmill. Positional movements increase pain.     6/21/19  2D ECHO overall normal Bi V function and valves. Bp improved today. CP has improved with NSAIDS, still sore at times after working out. Occ feels dizzy upon standing.   Still does weights/cardio.     Patient feels no sob, no leg swelling, no PND, no palpitation, no dizziness, no syncope, no CNS symptoms.    Patient has fairly good exercise tolerance. Works as .     Patient is compliant with medications.    FH - no CV disease    ECG - NSR    2D ECHO  CONCLUSIONS     1 - Concentric hypertrophy.     2 - No wall motion abnormalities.     3 - Normal left ventricular systolic function (EF 55-60%).     4 - Normal left ventricular diastolic function.     5 - Normal right ventricular systolic function .     6 - The estimated PA systolic pressure is 33 mmHg.     7 - Trivial tricuspid regurgitation.     8 - Intermediate central venous pressure.       This document has been electronically    SIGNED BY: Pearl Magdaleno MD On: 03/30/2019 11:42    Past Medical History:   Diagnosis Date    Anemia     Herpes simplex virus type 1 (HSV-1) dermatitis        History reviewed. No pertinent surgical history.    Social History     Tobacco Use    Smoking status: Never Smoker    Smokeless tobacco: Never Used   Substance Use  Topics    Alcohol use: Yes     Frequency: 2-4 times a month     Drinks per session: 3 or 4     Binge frequency: Less than monthly    Drug use: No       Family History   Problem Relation Age of Onset    Anemia Mother     Diabetes Father     Hypertension Father     Anemia Sister     No Known Problems Brother     No Known Problems Sister        Patient's Medications   New Prescriptions    No medications on file   Previous Medications    CHOLECALCIFEROL, VITAMIN D3, (VITAMIN D3) 1,000 UNIT CAPSULE    Take 1 capsule (1,000 Units total) by mouth once daily.    IBUPROFEN (ADVIL,MOTRIN) 600 MG TABLET    Take 1 tablet (600 mg total) by mouth every 6 (six) hours as needed for Other (Chest pain).    VALACYCLOVIR (VALTREX) 500 MG TABLET    Take 1 tablet (500 mg total) by mouth 2 (two) times daily. Take 3 day course as needed. for 3 days   Modified Medications    No medications on file   Discontinued Medications    PREDNISONE (DELTASONE) 10 MG TABLET    Take 3 daily for 3 days, then 2 daily for three days, then 1 daily for three days.       Review of Systems   Constitutional: Negative.    HENT: Negative.    Eyes: Negative.    Respiratory: Negative.    Cardiovascular: Positive for chest pain.   Gastrointestinal: Negative.    Genitourinary: Negative.    Musculoskeletal: Negative.    Skin: Negative.    Neurological: Negative.    Endo/Heme/Allergies: Negative.    Psychiatric/Behavioral: Negative.    All 12 systems otherwise negative.      Wt Readings from Last 3 Encounters:   06/21/19 84.5 kg (186 lb 4.6 oz)   05/21/19 82.7 kg (182 lb 5.1 oz)   03/20/19 82.1 kg (181 lb)     Temp Readings from Last 3 Encounters:   05/21/19 96.5 °F (35.8 °C) (Tympanic)   03/18/19 97.6 °F (36.4 °C) (Tympanic)     BP Readings from Last 3 Encounters:   06/21/19 130/72   05/21/19 128/86   03/20/19 (!) 146/80     Pulse Readings from Last 3 Encounters:   06/21/19 83   05/21/19 (!) 56   03/20/19 60       /72 (BP Location: Left arm, Patient  Position: Sitting)   Pulse 83   Ht 6' (1.829 m)   Wt 84.5 kg (186 lb 4.6 oz)   BMI 25.27 kg/m²     Objective:   Physical Exam   Constitutional: He is oriented to person, place, and time. He appears well-developed and well-nourished. No distress.   HENT:   Head: Normocephalic and atraumatic.   Nose: Nose normal.   Mouth/Throat: Oropharynx is clear and moist.   Eyes: Conjunctivae and EOM are normal. No scleral icterus.   Neck: Normal range of motion. Neck supple. No JVD present. No thyromegaly present.   Cardiovascular: Normal rate, regular rhythm, S1 normal and S2 normal. Exam reveals no gallop, no S3, no S4 and no friction rub.   No murmur heard.  Pulmonary/Chest: Effort normal and breath sounds normal. No stridor. No respiratory distress. He has no wheezes. He has no rales. He exhibits no tenderness.   Abdominal: Soft. Bowel sounds are normal. He exhibits no distension and no mass. There is no tenderness. There is no rebound.   Genitourinary:   Genitourinary Comments: Deferred   Musculoskeletal: Normal range of motion. He exhibits no edema, tenderness or deformity.   Lymphadenopathy:     He has no cervical adenopathy.   Neurological: He is alert and oriented to person, place, and time. He exhibits normal muscle tone. Coordination normal.   Skin: Skin is warm and dry. No rash noted. He is not diaphoretic. No erythema. No pallor.   Psychiatric: He has a normal mood and affect. His behavior is normal. Judgment and thought content normal.   Nursing note and vitals reviewed.      Lab Results   Component Value Date     05/21/2019    K 4.5 05/21/2019     05/21/2019    CO2 27 05/21/2019    BUN 21 (H) 05/21/2019    CREATININE 1.2 05/21/2019    GLU 91 05/21/2019    AST 23 05/21/2019    ALT 19 05/21/2019    ALBUMIN 4.4 05/21/2019    PROT 7.5 05/21/2019    BILITOT 0.5 05/21/2019    WBC 5.51 03/22/2019    HGB 12.6 (L) 03/22/2019    HCT 42.0 03/22/2019    MCV 88 03/22/2019     03/22/2019    TSH 0.799  03/22/2019    CHOL 154 03/22/2019    HDL 70 03/22/2019    LDLCALC 76.4 03/22/2019    TRIG 38 03/22/2019     Assessment:      1. Chest pain, exertional        Plan:   1. Chest pain, atypical  - 2D ECHO - negative  - discussed may be due to costochondritis - NSAIDS PRN  - if symptoms worse needs more urgent eval  - stress test if echo abnormal or exertional symptoms occur with walking      2. Elevated BP  - monitor at home  - decrease salt intake    Thank you for allowing me to participate in this patient's care. Please do not hesitate to contact me with any questions or concerns. Consult note has been forwarded to the referral physician.

## 2019-07-03 ENCOUNTER — OFFICE VISIT (OUTPATIENT)
Dept: INTERNAL MEDICINE | Facility: CLINIC | Age: 29
End: 2019-07-03
Payer: COMMERCIAL

## 2019-07-03 ENCOUNTER — HOSPITAL ENCOUNTER (OUTPATIENT)
Dept: RADIOLOGY | Facility: HOSPITAL | Age: 29
Discharge: HOME | End: 2019-07-03
Attending: PHYSICIAN ASSISTANT
Payer: COMMERCIAL

## 2019-07-03 VITALS
BODY MASS INDEX: 24.87 KG/M2 | SYSTOLIC BLOOD PRESSURE: 124 MMHG | DIASTOLIC BLOOD PRESSURE: 84 MMHG | OXYGEN SATURATION: 97 % | TEMPERATURE: 97 F | HEIGHT: 72 IN | HEART RATE: 60 BPM | WEIGHT: 183.63 LBS

## 2019-07-03 DIAGNOSIS — M54.50 LOW BACK PAIN, NON-SPECIFIC: ICD-10-CM

## 2019-07-03 DIAGNOSIS — M79.672 LEFT FOOT PAIN: ICD-10-CM
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
## 2019-07-03 DIAGNOSIS — M25.551 ACUTE PAIN OF RIGHT HIP: ICD-10-CM
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
## 2019-07-03 PROCEDURE — 73630 X-RAY EXAM OF FOOT: CPT | Mod: 26,LT,, | Performed by: RADIOLOGY

## 2019-07-03 PROCEDURE — 72100 X-RAY EXAM L-S SPINE 2/3 VWS: CPT | Mod: 26,,, | Performed by: RADIOLOGY

## 2019-07-03 PROCEDURE — 99999 PR PBB SHADOW E&M-EST. PATIENT-LVL IV: ICD-10-PCS | Mod: PBBFAC,,, | Performed by: PHYSICIAN ASSISTANT

## 2019-07-03 PROCEDURE — 99999 PR PBB SHADOW E&M-EST. PATIENT-LVL IV: CPT | Mod: PBBFAC,,, | Performed by: PHYSICIAN ASSISTANT

## 2019-07-03 PROCEDURE — 99214 PR OFFICE/OUTPT VISIT, EST, LEVL IV, 30-39 MIN: ICD-10-PCS | Mod: S$GLB,,, | Performed by: PHYSICIAN ASSISTANT

## 2019-07-03 PROCEDURE — 99214 OFFICE O/P EST MOD 30 MIN: CPT | Mod: S$GLB,,, | Performed by: PHYSICIAN ASSISTANT

## 2019-07-03 PROCEDURE — 3008F BODY MASS INDEX DOCD: CPT | Mod: CPTII,S$GLB,, | Performed by: PHYSICIAN ASSISTANT

## 2019-07-03 PROCEDURE — 72100 XR LUMBAR SPINE AP AND LATERAL: ICD-10-PCS | Mod: 26,,, | Performed by: RADIOLOGY

## 2019-07-03 PROCEDURE — 73630 X-RAY EXAM OF FOOT: CPT | Mod: TC,LT

## 2019-07-03 PROCEDURE — 73630 XR FOOT COMPLETE 3 VIEW LEFT: ICD-10-PCS | Mod: 26,LT,, | Performed by: RADIOLOGY

## 2019-07-03 PROCEDURE — 73502 XR HIP 2 VIEW RIGHT: ICD-10-PCS | Mod: 26,RT,, | Performed by: RADIOLOGY

## 2019-07-03 PROCEDURE — 73502 X-RAY EXAM HIP UNI 2-3 VIEWS: CPT | Mod: 26,RT,, | Performed by: RADIOLOGY

## 2019-07-03 PROCEDURE — 72100 X-RAY EXAM L-S SPINE 2/3 VWS: CPT | Mod: TC

## 2019-07-03 PROCEDURE — 3008F PR BODY MASS INDEX (BMI) DOCUMENTED: ICD-10-PCS | Mod: CPTII,S$GLB,, | Performed by: PHYSICIAN ASSISTANT

## 2019-07-03 PROCEDURE — 73502 X-RAY EXAM HIP UNI 2-3 VIEWS: CPT | Mod: TC,RT

## 2019-07-03 RX ORDER — MELOXICAM 15 MG/1
15 TABLET ORAL DAILY
Qty: 30 TABLET | Refills: 0 | Status: SHIPPED | OUTPATIENT
Start: 2019-07-03 | End: 2019-07-29

## 2019-07-03 RX ORDER — CYCLOBENZAPRINE HCL 10 MG
10 TABLET ORAL 3 TIMES DAILY PRN
Qty: 30 TABLET | Refills: 0 | Status: SHIPPED | OUTPATIENT
Start: 2019-07-03 | End: 2019-07-13
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
## 2019-07-05 ENCOUNTER — TELEPHONE (OUTPATIENT)
Dept: ORTHOPEDICS | Facility: CLINIC | Age: 29
End: 2019-07-05

## 2019-07-05 NOTE — TELEPHONE ENCOUNTER
2nd attempt to reschedule appointment. Called the patient about their appointment on 7/8/19 with Dr. Velasquez. Informed the patient that Dr. Velasquez dose not treat the foot. Left a message for the patient to give the office a call back to reschedule.FP

## 2019-07-05 NOTE — TELEPHONE ENCOUNTER
3rd attempt to reschedule appointment. Called the patient about their appointment on 7/8/19 with Dr. Velasquez. Informed the patient that Dr. Velasquez dose not treat the foot. Left a message for the patient to give the office a call back to reschedule.FP

## 2019-07-05 NOTE — TELEPHONE ENCOUNTER
1 st attempt to reschedule appointment. Called the patient about their appointment on 7/8/19 with Dr. Velasquez. Informed the patient that Dr. Velsaquez dose not treat the foot. Left a message for the patient to give the office a call back to reschedule.FP

## 2019-07-08 ENCOUNTER — OFFICE VISIT (OUTPATIENT)
Dept: ORTHOPEDICS | Facility: CLINIC | Age: 29
End: 2019-07-08
Payer: COMMERCIAL

## 2019-07-08 ENCOUNTER — TELEPHONE (OUTPATIENT)
Dept: ORTHOPEDICS | Facility: CLINIC | Age: 29
End: 2019-07-08

## 2019-07-08 VITALS
WEIGHT: 183.63 LBS | BODY MASS INDEX: 24.87 KG/M2 | HEIGHT: 72 IN | HEART RATE: 51 BPM | DIASTOLIC BLOOD PRESSURE: 77 MMHG | SYSTOLIC BLOOD PRESSURE: 143 MMHG

## 2019-07-08 DIAGNOSIS — M79.672 PAIN OF LEFT HEEL: Primary | ICD-10-CM

## 2019-07-08 DIAGNOSIS — R26.9 GAIT ABNORMALITY: ICD-10-CM

## 2019-07-08 DIAGNOSIS — S86.899A MEDIAL TIBIAL STRESS SYNDROME, INITIAL ENCOUNTER: ICD-10-CM

## 2019-07-08 PROCEDURE — 3008F PR BODY MASS INDEX (BMI) DOCUMENTED: ICD-10-PCS | Mod: CPTII,S$GLB,, | Performed by: PHYSICAL MEDICINE & REHABILITATION

## 2019-07-08 PROCEDURE — 99999 PR PBB SHADOW E&M-EST. PATIENT-LVL III: ICD-10-PCS | Mod: PBBFAC,,, | Performed by: PHYSICAL MEDICINE & REHABILITATION

## 2019-07-08 PROCEDURE — 99999 PR PBB SHADOW E&M-EST. PATIENT-LVL III: CPT | Mod: PBBFAC,,, | Performed by: PHYSICAL MEDICINE & REHABILITATION

## 2019-07-08 PROCEDURE — 3008F BODY MASS INDEX DOCD: CPT | Mod: CPTII,S$GLB,, | Performed by: PHYSICAL MEDICINE & REHABILITATION

## 2019-07-08 PROCEDURE — 99204 OFFICE O/P NEW MOD 45 MIN: CPT | Mod: S$GLB,,, | Performed by: PHYSICAL MEDICINE & REHABILITATION

## 2019-07-08 PROCEDURE — 99204 PR OFFICE/OUTPT VISIT, NEW, LEVL IV, 45-59 MIN: ICD-10-PCS | Mod: S$GLB,,, | Performed by: PHYSICAL MEDICINE & REHABILITATION

## 2019-07-08 NOTE — TELEPHONE ENCOUNTER
Attempted to call patient regarding appointment today 07/08/2019 with Dr. Vogel. Left v/m for patient to call me back.

## 2019-07-08 NOTE — PATIENT INSTRUCTIONS
Over-striding - Currently you are landing too far in front of your body. The more you over-stride, the quicker forces are applied to the body. This is called the loading rate, and high loading rates are associated with multiple injuries and decreased efficiency. Unfortunately, it will be tough to completely correct this until we get your hip flexors loosened up, but there are some things we can do in the meantime.  We need to stretch the hip flexors, strengthen the gluteus maliha, and learn to drive from the hip. Then we can shift your landing to underneath you - landing closer and pushing off further behind you. Focus on driving the leg back using the hip muscles, and then try to put the foot back down behind you. You can also try landing softer when you run.     Sonia - We want to speed up the leg turnover, but take shorter strides so that your pace doesnt change. Once you have warmed up and settled into your run, count how many times one foot hits the ground in 30 seconds, then multiply by 2. Try to get to at least 86, then possibly work up to higher numbers when running faster. If the number is less than 86, try shortening your stride and turning over quicker for a few minutes, and then forget about it - just run! 5 minutes later, repeat the process. Or try running with a metronome jennifer or maybe one of these apps: http://www.runningmetronome.org/top-5-running-apps/ Gradually youll find your sonia picking up.                   Toe Yoga - The purpose is to give you a smarter, stronger, and more stable foot. The big toe accounts for about 85% of our stability, get it stronger! Start with coordination:  1. Keeping little toes down, lift the big toe.  2. Put big toe down, lift the small toes.  3. Alternate for 20 - 30 seconds before taking a break.  4. Progress from doing this sitting to standing to single leg stance.        For Foot Strength:  1. Pick the little toes up, drive the big toe down without curling  it.  2. Gradually hold this squeezing for longer and longer.  3. Again progress from sitting to standing to single leg stance.  4. Start incorporating this with standing exercises like squats and deadlifts and all single leg work.     For a good primer, check out this link - Are You Ready to go Minimal: https://www.[employer].com/watch?v=YtICeFOKjIs    Balance Issues - Balance and Proprioception (ability to feel where the body is in space) is important to keep us upright and keep our joints in good position. The foot has to be able to micro-corrections within a few hundredths of a second while running, otherwise problems will show up somewhere. Fortunately, improving balance is one of the quickest things we can do as it is more about neurological control rather than strength. Toe yoga will help keep the great toe on the ground which will give you better control. Good posture keeps weight on the forefoot so you can control side to side forces better. Standing on one leg multiple times throughout the day will improve balance quickly and even develop some endurance in leg and hip muscles.    To improve, practice many times a day. Find good posture, drive the toe down to stabilize the arch, brace your core, and stand on one leg for 20 - 30 seconds, then the other. Advance from quiet standing, to gentle side to side rotation, to quiet standing with eyes closed, to rotation with eyes closed.           Lower Leg Progression:    1. Double leg heel raises: 3 x 20 (besides just picking the heel up, make sure you also invert it, or turn it in, as you come up.)    2. Single leg heel raise: 3 x 15 (same as above, the heel should invert as you come up.)    3. Bonus level: Eccentric calf raises: 3 x 15 - not sure if it helps tib post as much, but can give you bigger stronger Achilleswhich is essentially a big spring giving you free energy.      4. Rocker board: 3 x 20 in each direction (Remember to brace core and brace foot (toe  "yoga) and control the board with your ankle, not by moving your body. Its ok to lightly hold onto something for balance, but try working towards not holding on.)                  5. Single leg kettlebell press: 3 x 10 - 15      6. Single Leg Kettleball Swap: 3 x 30 seconds: See this link  https://www.[employer].com/watch?v=hcXKajLSCQs      7. Foot Screws:       Hip flexors: Use whichever stretch you prefer, the kneeling or lunging type, or switch them up to keep it exciting.     rotate pelvis backwards and flatten the low back                       Hold this position for several minutes.          Calves/Soleus: (If you tend to pronate, place a rolled up wash cloth under your big toe to prevent this and increase the stretch in the calf.)  Straight knee stretches the gastro while bent knee stretches the soleus better.           Plantar Fascia Mobilization: Apply pressure to a tender spot using thumbs or ball, then flex & extend toes for 20 seconds before moving to a different spot. Alternatively, roll the entire foot for a minute or two. Do this every other day for several months.          Tibialis Posterior Mobilization: Work your thumbs along the muscles just behind the shin bone. When you find a trigger point, apply pressure until about 4/10 pain, then go through full range of flexion & extension at the ankle. This will bring you up to about 6/10 pain. Wiggle the ankle for about 20 seconds, then look for a new spot. The goal is to be somewhere between pleasant and agonizing with the pressure! Do this for 6 weeks or until you can't find hotspots.     Picture credit - "Anatomy for Runners" by KEIRA Ashby      Activity Modification Guidelines    1. Ok to Run/Play/Exercise with mild pain, no more than 3 out of 10 on pain scale, but need to stop activity if pain is moderate or 4 out of 10 and above.    2. Caveat - if pain with activity starts at worse than 3/10, but decreases within a few minutes, it's OK to push through. "     3. No Running/Lifting if limping/changing gait/changing lifting form.    4. Advance mileage/weights by no more than 10% per week. Long run mileage shouldn't be more than about 30% total weekly mileage.     Tracy Vogel M.D.

## 2019-07-08 NOTE — PROGRESS NOTES
PM&R NEW PATIENT HISTORY & PHYSICAL:    Referring Physician: Self, Aaareferral    Chief Complaint   Patient presents with    Ankle Injury     Left ankle injury from running       HPI: This is a 28 y.o.  male being seen in clinic today for evaluation of Ankle Injury (Left ankle injury from running)    Previously did more strength training and joined [facility] in April. The problem first began about 8 days ago after doing [employer]. Remember's a slight inversion to the left ankle while running, but not bad. Woke up next morning with pain. He feels sharp pain in his left ankle worsened with weight bearing. Denies previous ankle injury. Hasn't been able to run all last week. The symptoms are improving. He is supposed to do a [employer] Race this weekend, involving 8 miles running and 25 obstacles. He has tried ice, mobic, flexeril with improvement. He has not tried physical therapy. 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    History obtained from patient.    Past family, medical, social, surgical history, and vital signs reviewed in chart.    Review of Systems   Constitutional: Negative for chills, fever and weight loss.   HENT: Negative for hearing loss and sore throat.    Eyes: Negative for blurred vision, photophobia and pain.   Respiratory: Negative for shortness of breath.    Cardiovascular: Negative for chest pain.   Gastrointestinal: Negative for abdominal pain.   Genitourinary: Negative for dysuria.   Musculoskeletal:        He also mentions bilateral shin splints during running that resolves after 1 mile   Skin: Negative for rash.   Neurological: Negative for tingling and headaches.   Endo/Heme/Allergies: Does not bruise/bleed easily.   Psychiatric/Behavioral: Negative for depression.       Physical Exam   Constitutional: He is oriented to person, place, and time. He appears well-developed and well-nourished.   HENT:   Head: Normocephalic and atraumatic.   Eyes: Pupils are equal, round, and reactive to  light. EOM are normal.   Neck: Normal range of motion. Neck supple.   Cardiovascular: Intact distal pulses.   Pulmonary/Chest: Effort normal.   Abdominal: He exhibits no distension.   Musculoskeletal:        Left foot: There is decreased range of motion (minimally decreased calcaneal eversion) and tenderness (slight to deep pressure along lateral, posterior calcaneus. None through ankle ligaments or david's/posterior heel/PF). There is no swelling.   Poor toe coordination and reactive balance with single leg stance. Big toe drifts into adduction, but no bunion formation. Improves at toe coordination quickly with practice. Bilateral tight calves and hip flexors. 5/5 bilateral ankle eversion and inversion.   Neurological: He is alert and oriented to person, place, and time. He has normal strength and normal reflexes. No sensory deficit.   Skin: Skin is warm and dry.   Psychiatric: He has a normal mood and affect.   Vitals reviewed.      IMPRESSION/PLAN: Danisha is a 28 y.o.  male with:    1. Pain of left heel    2. Medial tibial stress syndrome, initial encounter    3. Gait abnormality       The findings were discussed with Danisha in detail. He inquired about stress fracture and we discussed that may be a possibility, but I'm not overly concerned about it at this point and in this location. We discussed possibly trying orthotics and how to choose better running shoes in the future. We discussed that his current problems are often associated with over-striding, so he will work on checking and correcting that, and working on sonia. He was given a HEP to help with the foot and over-striding. All of his questions were answered. He was provided this plan in writing. He will follow-up with me in 4 weeks.     Tracy Vogel M.D.  Physical Medicine and Rehab

## 2019-07-28 ENCOUNTER — PATIENT MESSAGE (OUTPATIENT)
Dept: ORTHOPEDICS | Facility: CLINIC | Age: 29
End: 2019-07-28

## 2019-07-29 ENCOUNTER — OFFICE VISIT (OUTPATIENT)
Dept: INTERNAL MEDICINE | Facility: CLINIC | Age: 29
End: 2019-07-29
Payer: COMMERCIAL

## 2019-07-29 VITALS
DIASTOLIC BLOOD PRESSURE: 80 MMHG | OXYGEN SATURATION: 98 % | BODY MASS INDEX: 24.87 KG/M2 | SYSTOLIC BLOOD PRESSURE: 124 MMHG | HEIGHT: 72 IN | HEART RATE: 48 BPM | TEMPERATURE: 96 F | WEIGHT: 183.63 LBS

## 2019-07-29 DIAGNOSIS — K21.00 GASTROESOPHAGEAL REFLUX DISEASE WITH ESOPHAGITIS: Primary | ICD-10-CM

## 2019-07-29 PROCEDURE — 99999 PR PBB SHADOW E&M-EST. PATIENT-LVL III: ICD-10-PCS | Mod: PBBFAC,,, | Performed by: PHYSICIAN ASSISTANT

## 2019-07-29 PROCEDURE — 3008F BODY MASS INDEX DOCD: CPT | Mod: CPTII,S$GLB,, | Performed by: PHYSICIAN ASSISTANT

## 2019-07-29 PROCEDURE — 99999 PR PBB SHADOW E&M-EST. PATIENT-LVL III: CPT | Mod: PBBFAC,,, | Performed by: PHYSICIAN ASSISTANT

## 2019-07-29 PROCEDURE — 99214 PR OFFICE/OUTPT VISIT, EST, LEVL IV, 30-39 MIN: ICD-10-PCS | Mod: S$GLB,,, | Performed by: PHYSICIAN ASSISTANT

## 2019-07-29 PROCEDURE — 99214 OFFICE O/P EST MOD 30 MIN: CPT | Mod: S$GLB,,, | Performed by: PHYSICIAN ASSISTANT

## 2019-07-29 PROCEDURE — 3008F PR BODY MASS INDEX (BMI) DOCUMENTED: ICD-10-PCS | Mod: CPTII,S$GLB,, | Performed by: PHYSICIAN ASSISTANT

## 2019-07-29 RX ORDER — DICYCLOMINE HYDROCHLORIDE 10 MG/1
10 CAPSULE ORAL 3 TIMES DAILY
Qty: 40 CAPSULE | Refills: 0 | Status: SHIPPED | OUTPATIENT
Start: 2019-07-29 | End: 2019-08-28

## 2019-07-29 RX ORDER — PANTOPRAZOLE SODIUM 40 MG/1
40 TABLET, DELAYED RELEASE ORAL DAILY
Qty: 30 TABLET | Refills: 11 | Status: SHIPPED | OUTPATIENT
Start: 2019-07-29 | End: 2022-02-03

## 2019-07-29 NOTE — PROGRESS NOTES
Subjective:       Patient ID: Danisha Jones is a 28 y.o. male.    Chief Complaint: Abdominal Pain (acid reflux)    Gastroesophageal Reflux   He complains of abdominal pain, chest pain and heartburn. This is a new problem. The current episode started in the past 7 days. The problem occurs constantly. The problem has been unchanged. The heartburn is located in the substernum. The heartburn is of moderate intensity. The heartburn does not wake him from sleep. The heartburn does not limit his activity. The heartburn doesn't change with position. The symptoms are aggravated by certain foods. Pertinent negatives include no fatigue. There are no known risk factors. He has tried an antacid for the symptoms. The treatment provided moderate relief.     Past Medical History:   Diagnosis Date    Anemia     Herpes simplex virus type 1 (HSV-1) dermatitis        Review of Systems   Constitutional: Negative for chills, fatigue and fever.   HENT: Negative for congestion.    Respiratory: Negative for chest tightness and shortness of breath.    Cardiovascular: Positive for chest pain.   Gastrointestinal: Positive for abdominal pain and heartburn.       Objective:      Physical Exam   Constitutional: He appears well-developed and well-nourished. No distress.   HENT:   Head: Normocephalic and atraumatic.   Neck: Neck supple.   Cardiovascular: Normal rate and regular rhythm. Exam reveals no gallop and no friction rub.   No murmur heard.  Pulmonary/Chest: Effort normal and breath sounds normal. No stridor. No respiratory distress. He has no wheezes. He has no rales. He exhibits no tenderness.   Abdominal: Soft. He exhibits no distension and no mass. There is tenderness. There is no rebound and no guarding. No hernia.   Lymphadenopathy:     He has no cervical adenopathy.   Skin: He is not diaphoretic.   Nursing note and vitals reviewed.      Assessment:       1. Gastroesophageal reflux disease with esophagitis        Plan:        Gastroesophageal reflux disease with esophagitis    Other orders  -     dicyclomine (BENTYL) 10 MG capsule; Take 1 capsule (10 mg total) by mouth 3 (three) times daily.  Dispense: 40 capsule; Refill: 0  -     pantoprazole (PROTONIX) 40 MG tablet; Take 1 tablet (40 mg total) by mouth once daily.  Dispense: 30 tablet; Refill: 11

## 2019-07-29 NOTE — PATIENT INSTRUCTIONS
Tips to Control Acid Reflux    To control acid reflux, youll need to make some basic diet and lifestyle changes. The simple steps outlined below may be all youll need to ease discomfort.  Watch what you eat  · Avoid fatty foods and spicy foods.  · Eat fewer acidic foods, such as citrus and tomato-based foods. These can increase symptoms.  · Limit drinking alcohol, caffeine, and fizzy beverages. All increase acid reflux.  · Try limiting chocolate, peppermint, and spearmint. These can worsen acid reflux in some people.  Watch when you eat  · Avoid lying down for 3 hours after eating.  · Do not snack before going to bed.  Raise your head  Raising your head and upper body by 4 to 6 inches helps limit reflux when youre lying down. Put blocks under the head of your bed frame to raise it.  Other changes  · Lose weight, if you need to  · Dont exercise near bedtime  · Avoid tight-fitting clothes  · Limit aspirin and ibuprofen  · Stop smoking   Date Last Reviewed: 7/1/2016  © 8046-9268 [employer] [place]. [address] [place] [address], [place] [address]. All rights reserved. This information is not intended as a substitute for professional medical care. Always follow your healthcare professional's instructions.

## 2019-08-08 ENCOUNTER — OFFICE VISIT (OUTPATIENT)
Dept: PHYSICAL MEDICINE AND REHAB | Facility: CLINIC | Age: 29
End: 2019-08-08
Payer: COMMERCIAL

## 2019-08-08 VITALS
HEART RATE: 61 BPM | BODY MASS INDEX: 24.87 KG/M2 | SYSTOLIC BLOOD PRESSURE: 147 MMHG | HEIGHT: 72 IN | DIASTOLIC BLOOD PRESSURE: 79 MMHG | WEIGHT: 183.63 LBS

## 2019-08-08 DIAGNOSIS — M79.672 PAIN OF LEFT HEEL: Primary | ICD-10-CM

## 2019-08-08 DIAGNOSIS — S86.899A MEDIAL TIBIAL STRESS SYNDROME, INITIAL ENCOUNTER: ICD-10-CM

## 2019-08-08 DIAGNOSIS — R26.9 GAIT ABNORMALITY: ICD-10-CM

## 2019-08-08 PROCEDURE — 99354 PR PROLONGED SVC, OUPT, 1ST HR: CPT | Mod: S$GLB,,, | Performed by: PHYSICAL MEDICINE & REHABILITATION

## 2019-08-08 PROCEDURE — 3008F PR BODY MASS INDEX (BMI) DOCUMENTED: ICD-10-PCS | Mod: CPTII,S$GLB,, | Performed by: PHYSICAL MEDICINE & REHABILITATION

## 2019-08-08 PROCEDURE — 99999 PR PBB SHADOW E&M-EST. PATIENT-LVL III: CPT | Mod: PBBFAC,,, | Performed by: PHYSICAL MEDICINE & REHABILITATION

## 2019-08-08 PROCEDURE — 99215 OFFICE O/P EST HI 40 MIN: CPT | Mod: S$GLB,,, | Performed by: PHYSICAL MEDICINE & REHABILITATION

## 2019-08-08 PROCEDURE — 99215 PR OFFICE/OUTPT VISIT, EST, LEVL V, 40-54 MIN: ICD-10-PCS | Mod: S$GLB,,, | Performed by: PHYSICAL MEDICINE & REHABILITATION

## 2019-08-08 PROCEDURE — 99999 PR PBB SHADOW E&M-EST. PATIENT-LVL III: ICD-10-PCS | Mod: PBBFAC,,, | Performed by: PHYSICAL MEDICINE & REHABILITATION

## 2019-08-08 PROCEDURE — 3008F BODY MASS INDEX DOCD: CPT | Mod: CPTII,S$GLB,, | Performed by: PHYSICAL MEDICINE & REHABILITATION

## 2019-08-08 PROCEDURE — 99354 PR PROLONGED SVC, OUPT, 1ST HR: ICD-10-PCS | Mod: S$GLB,,, | Performed by: PHYSICAL MEDICINE & REHABILITATION

## 2019-08-08 NOTE — PROGRESS NOTES
PM&R Runner Gait Analysis H&P :    Referring Physician: No ref. provider found    HPI: Danisha Jones is a 28 y.o.  male being seen in clinic today for evaluation of Gait Problem (Gait Analysis)   He comes in for follow-up and gait analysis today. Previously saw for ankle/foot pain following a fast 5K. He notes pain resolved after 1.5 weeks. Shin splint type pain has also been a little better. Has worked on my HEP. Moving to [place] next week, going to do more trail running and start training for [facility].      He has been running for less than a year and typically runs 15 - 20 miles per week, but recently down to 10. He typically runs alone and with [employer]. He previously has not had running related injuries. He does currently strength train, which includes what they do at [facility]. His current running shoes are [employer]. His current goals are MS marathon.     History obtained from patient.     Past family, medical, social, and surgical history reviewed in chart.    Review of Systems   Constitutional: Negative for chills, fever and weight loss.   HENT: Negative for hearing loss and sore throat.    Eyes: Negative for blurred vision, photophobia and pain.   Respiratory: Negative for shortness of breath.    Cardiovascular: Negative for chest pain.   Gastrointestinal: Negative for abdominal pain.   Genitourinary: Negative for dysuria.   Skin: Negative for rash.   Neurological: Negative for tingling and headaches.   Endo/Heme/Allergies: Does not bruise/bleed easily.   Psychiatric/Behavioral: Negative for depression.       Physical Exam:    Running Gait Analysis performed with assistance of slow-motion video and reveals:    Vertical Displacement: Normal  Head Position: Normal  Relative Landing Position: Significant Over-striding  Lumbar: Neutral  Pelvic Control: Neutral control  Rotational Control: Decreased rotation  Knee Position: Neutral, both knees pointing straight  Stride Width: Normal  Arm Carriage:  Neutral  Melissa: ? steps per minute  Hip Extension: None    Range of Motion:  R. Hip Extension: 0 degrees  L. Hip Extension: + 5 degrees  R. Ankle DF: 30 degrees L. Ankle DF: 30 degrees  R. Great Toe Extension: 20 degrees L. Great Toe Extension: 20 degrees  R. Hip Flexion: > 70 degrees L. Hip Flexion: > 70 degrees    Alpesh's Test:   R: femur neutral, external tibial torsion L: femur neutral, external tibial torsion    Single Leg Squat Test:  R: knee adduction and loss of first ray contact  L: knee adduction, trunk lean, loss of first ray contact and foot touching down    Single Leg Bridge Test:  R: back tightness  L: hips dropping    Physical Exam   Constitutional: He is oriented to person, place, and time. He appears well-developed and well-nourished.   HENT:   Head: Normocephalic and atraumatic.   Eyes: Pupils are equal, round, and reactive to light. EOM are normal.   Neck: Normal range of motion. Neck supple.   Cardiovascular: Intact distal pulses.   Pulmonary/Chest: Effort normal.   Abdominal: He exhibits no distension.   Neurological: He is alert and oriented to person, place, and time. He has normal strength and normal reflexes. No sensory deficit.   Skin: Skin is warm and dry.   Psychiatric: He has a normal mood and affect.   Vitals reviewed.      IMPRESSION/PLAN: This is a 28 y.o.  male with Gait Problem (Gait Analysis)      1. Pain of left heel    2. Medial tibial stress syndrome, initial encounter    3. Gait abnormality       Today's findings were discussed with Jahryley in detail. We reviewed his running form, training history, and exam findings to explain how this is contributing to the injury. He shows significant side to side upper body motion with reduced arm swing, and significant over-striding with no hip extension. He was taught multiple stretches, mobilizations, exercises, and drills to help correct these mechanics. This should help with his injury and hopefully also with running efficiency. He  "was given this plan in writing. We discussed how to continue with regular training and follow the "Activity Modification Guidelines".  All questions were solicited and answered. He will follow up with me PRN.    Twenty five minutes were spent obtaining detailed history of his injury and training, and routine physical exam. Another 25 minutes were spent obtaining and reviewing slow motion video of him running on a treadmill from 2 to 3 different angles, as well as hop down tests and single leg squat tests. The findings of these videos were discussed with Danisha. Forty minutes were spent on running specific range of motion testing, special testing, and educating him on the plan detailed below.     This is his plan:  Posture - Healthy spines and healthy running both start with good posture. The more you learn to sit and stand with good posture throughout the day, the more likely you are to run with it. Too often people allow the pelvis to tilt forward, the low back to arch, and the shoulders and head to round forward - similar to this picture from "Anatomy for Runners":        This posture leads to more weight on the heel than forefoot, compromising balance; and over-striding with running with lack of full use of the glutes for propulsion.  Fix it by following these steps when standing and before starting your run:  1. Squeeze the glutes to set the pelvis.  2. Drop your chest/rib cage down and lean forward just enough to feel equal weight on heels and forefeet.  3. Rotate arms/hands outward to pull the shoulder blades back, then allow forearms to rotate back in.     The girl above would then look more like this:      Follow link to a video reminder: [email or web address]-[email or web address]-[email or web address]    Balance Issues - Balance and Proprioception (ability to feel where the body is in space) is important to keep us upright and keep our joints in good position. The foot has to be able to micro-corrections " within a few hundredths of a second while running, otherwise problems will show up somewhere. Fortunately, improving balance is one of the quickest things we can do as it is more about neurological control rather than strength. Toe yoga will help keep the great toe on the ground which will give you better control. Good posture keeps weight on the forefoot so you can control side to side forces better. Standing on one leg multiple times throughout the day will improve balance quickly and even develop some endurance in leg and hip muscles.    To improve, practice many times a day. Find good posture, drive the toe down to stabilize the arch, brace your core, and stand on one leg for 20 - 30 seconds, then the other. Advance from quiet standing, to gentle side to side rotation, to quiet standing with eyes closed, to rotation with eyes closed.         Toe Yoga - The purpose is to give you a smarter, stronger, and more stable foot. The big toe accounts for about 85% of our stability, get it stronger! Start with coordination:  1. Keeping little toes down, lift the big toe.  2. Put big toe down, lift the small toes.  3. Alternate for 20 - 30 seconds before taking a break.  4. Progress from doing this sitting to standing to single leg stance.        For Foot Strength:  1. Pick the little toes up, drive the big toe down without curling it.  2. Gradually hold this squeezing for longer and longer.  3. Again progress from sitting to standing to single leg stance.  4. Start incorporating this with standing exercises like squats and deadlifts and all single leg work.     For a good primer, check out this link - Are You Ready to go Minimal: https://www.youChimeros.com/watch?v=YtICeFOKjIs    Working through these progressions:    Unless otherwise stated, you should only be doing one exercise from each progression listed below. These are listed in order of difficulty, so start with the first one in each list. Do as many reps as you can  while maintaining excellent form. Stop doing the exercise if you fatigue or can't maintain proper form. Once you can do the recommended amount of reps for that exercise, stop doing that one and move on to the next exercise in that progression list during your next workout.     Bridge Progression: The purpose is to practice pushing the leg back using the glutes while maintaining a neutral spine. Start with your foot close enough to touch the heel. Brace your core without letting your back arch or flatten, or rotate for single leg exercises. Squeeze the glutes and drive down through the heel. If you feel tightness in the low back, you need to either brace the core more, use the glutes more, or don't lift the hips quite as high. Arms down is easier, arms up is harder.     1. Double leg bridge with arms up: 3 x 10 - 20        2. Bridge march with arms up: 3 x 20 Don't allow the hips to rotate.        3. Single leg bridge arms up: 3 x 10 - 15 each leg        4. Rotisserie Chicken Exercises:    This can be done with one leg on a chair or bench, or done from the single leg bridge position. Build up to 3 x 12.       Hip Abduction Progression:      Lateral control - This is one of the most common problems with runners and can contribute to many different injuries and wasted energy. Improving hip strength will help significantly. As your hips get stronger, think about actively squeezing your glutes when you run - that will help get these muscles firing. Do only 1 of these exercises per workout and advance to the next exercise once you can do the recommended amount of reps.     3. Seven way hips: Start with 3 x 2 - 3 reps and progress to 3 x 8 - 10 reps. Follow this link:  https://www.GlideTV.com/watch?v=YdenOdoz-MI      4. Standing hip hikes: 3 x 20  (bonus points: hold core tight, good posture with weight on forefoot, and toe yoga)        5. Amadou hip exercises: 3 x 8 - 12 (Start with 8 reps. Once you build up to 12, use  a tighter band.)    A. Theraband pulling straight out to side.             B. Theraband pulling 45 degrees posterior.            C. Standing hip rotations with resistance:  https://www.youRisk Management Solutionube.com/watch?v=gfHmUIuDmG0          6. Monster Walks: 3 x 30 - 60 seconds. See video: https://www.youRisk Management Solutionube.com/watch?c=ykH513NIWef  You could also do monster walks going around a small square with 5 to 10 foot sides. Go around it in each direction.    7. Standing Clamshells: 3 x 8 - 12 reps  https://www.Hookitube.com/watch?v=OONzLErA9PU    Lower Leg Progression:    1. Double leg heel raises: 3 x 20 (besides just picking the heel up, make sure you also invert it, or turn it in, as you come up.)    2. Single leg heel raise: 3 x 15 (same as above, the heel should invert as you come up.)    3. Bonus level: Eccentric calf raises: 3 x 15 - not sure if it helps tib post as much, but can give you bigger stronger Achilleswhich is essentially a big spring giving you free energy.      4. Rocker board: 3 x 20 in each direction (Remember to brace core and brace foot (toe yoga) and control the board with your ankle, not by moving your body. Its ok to lightly hold onto something for balance, but try working towards not holding on.)                  5. Single leg kettlebell press: 3 x 10 - 15      6. Single Leg Kettleball Swap: 3 x 30 seconds: See this link  https://www.Hookitube.com/watch?v=hcXKajLSCQs      7. Foot Screws:       Plank Progression:    Healthier running and healthier spines starts with good posture. Maintaining good posture depends on awareness of spine position and the control, strength, and endurance to maintain it. Planks are a great way to build core strength and endurance in a spine neutral position.     1. First, build up to 3 sets of 40 - 60 second planks, including straight and side planks         Running & sports involve dynamic movement, so planks with movements have better carry over. Be sure to keep the spine in as much  "of a neutral position as possible.     2. Then back of the time to about 20 seconds and add movements to make it harder. Add 5 seconds each week. Here are just a few ideas:                 3. Carries are like moving planks that reinforce good posture. Be sure to keep the spine tall and straight. Each type of carry works things a little differently. Pick a different one to do each week and do three sets of 30 - 45 seconds.     A. Wagoners Carry: good for posture in general.      B. Suitcase Carry: good for lateral chain/oblique work, like a walking side plank.      C. s Carry: good for more back muscle activation and core control. Keep the ribs down and in to avoid over-arching the back.      Intermediate Glute Max Progression:     With these exercises:  - continue to focus on abdominal bracing  - think about using your foot muscles (toe yoga) to prevent the foot from collapsing in  - keep the middle of the kneecap lined up over the second toe  - good control is more important than the speed of the exercise or how many you do!  - OK to do more than one of these at a time, if your glutes can handle it.    1. Chair of Death squats: 3 x 15   Drive hips back, keeping tibia as vertical as possible. The bar Im holding is to remind me not to flex my spine on the way down, or arch my back on the way up. Think "sit back" rather than "squat down".        2. Lunges: 3 x 10 Keep torso upright and dont let knee go past toes. Do multi-directional lunges for bonus points.      3. Hip Hinges: 3 x 15 Use a braced core to keep the spine in a neutral position. A stick can help you to know if spine is staying in right position. Think hips back, hips forward. Ok to bend the knees slightly to take stretch off of the hamstrings.       4. Single leg deadlifts: 3 x 10    Remember to keep spine straight as well as the leg you are kicking back. Only once you are sure youre using good form should you drop the dominic and pick-up a " dumbbell or barbell.         Stretching for Range of Motion:    Remember not to do static stretching before running or working out. Do something to warm up instead. Its best to stretch after runs or workouts, or any time on rest days. Stretch at least 4 days per week, 1 - 2 minutes per stretch. It will take 10 - 12 weeks to get the full benefit.     Hip flexors: Use whichever stretch you prefer, the kneeling or lunging type, or switch them up to keep it exciting.     rotate pelvis backwards and flatten the low back                       Hold this position for several minutes.              Remember to swing your arms BACK.           Advanced Strength Training for Endurance:     Strength workouts for runners, once or twice weekly workout could look like this -  5 minute dynamic/active warmup  3 sets of:  3 - 6 reps of a hip hinge based move, 30 - 60 seconds rest, 5 reps of a plyo like rocket jumps, 2 min rest, repeat.  3 sets of:  3 - 6 reps squat based   Core exercise  5 - 10 reps of upper body movement  3 - 6 reps of another hip hinge based  10 - 20 seconds plyo move  Gluteus medius/hip stability movement  Stretch hip flexors and other tight spots    Use the 6 reps in the first week or two of a new training block, at around 65 - 75% of 1 rep max (1RM) (estimation is fine).  Week 3 go with 4 - 5 reps at 75 - 85% 1RM  Week 4 go with 2 - 3 reps at 85 - 95% 1RM  Start the cycle over every 4 weeks with new exercises.    Also, dont necessarily need to worry about finding 1RM as long you are going heavy enough that the last set is pretty difficult.    Hip hinge based:  Squat based:   Plyo:  Deadlifts   Back Squats   Explosive Mountain Climbers  Hip thrusts   Front Squats   Box Jumps  Latvian deadlift  Split Squats   Rocket Jumps  KB Swings   Weighted Lunges  Depth Jumps  Single leg deadlift  Push Press   Rebound Jumps  Trap bar deadlift  Cleans    Bounding          Hopping          A, B, C-skips          Short  Sprints    C-skips: Look for this in the videos section of my Dr. Tracy Vogel Facebook page.        Tracy Vogel M.D.  Physical Medicine and Rehab

## 2019-08-08 NOTE — PATIENT INSTRUCTIONS
"Posture - Healthy spines and healthy running both start with good posture. The more you learn to sit and stand with good posture throughout the day, the more likely you are to run with it. Too often people allow the pelvis to tilt forward, the low back to arch, and the shoulders and head to round forward - similar to this picture from "Anatomy for Runners":        This posture leads to more weight on the heel than forefoot, compromising balance; and over-striding with running with lack of full use of the glutes for propulsion.  Fix it by following these steps when standing and before starting your run:  1. Squeeze the glutes to set the pelvis.  2. Drop your chest/rib cage down and lean forward just enough to feel equal weight on heels and forefeet.  3. Rotate arms/hands outward to pull the shoulder blades back, then allow forearms to rotate back in.     The girl above would then look more like this:      Follow link to a video reminder: [email or web address]    Balance Issues - Balance and Proprioception (ability to feel where the body is in space) is important to keep us upright and keep our joints in good position. The foot has to be able to micro-corrections within a few hundredths of a second while running, otherwise problems will show up somewhere. Fortunately, improving balance is one of the quickest things we can do as it is more about neurological control rather than strength. Toe yoga will help keep the great toe on the ground which will give you better control. Good posture keeps weight on the forefoot so you can control side to side forces better. Standing on one leg multiple times throughout the day will improve balance quickly and even develop some endurance in leg and hip muscles.    To improve, practice many times a day. Find good posture, drive the toe down to stabilize the arch, brace your core, and stand on one leg for 20 - 30 seconds, then the other. Advance " from quiet standing, to gentle side to side rotation, to quiet standing with eyes closed, to rotation with eyes closed.         Toe Yoga - The purpose is to give you a smarter, stronger, and more stable foot. The big toe accounts for about 85% of our stability, get it stronger! Start with coordination:  1. Keeping little toes down, lift the big toe.  2. Put big toe down, lift the small toes.  3. Alternate for 20 - 30 seconds before taking a break.  4. Progress from doing this sitting to standing to single leg stance.        For Foot Strength:  1. Pick the little toes up, drive the big toe down without curling it.  2. Gradually hold this squeezing for longer and longer.  3. Again progress from sitting to standing to single leg stance.  4. Start incorporating this with standing exercises like squats and deadlifts and all single leg work.     For a good primer, check out this link - Are You Ready to go Minimal: https://www.[employer].com/watch?v=YtICeFOKjIs    Working through these progressions:    Unless otherwise stated, you should only be doing one exercise from each progression listed below. These are listed in order of difficulty, so start with the first one in each list. Do as many reps as you can while maintaining excellent form. Stop doing the exercise if you fatigue or can't maintain proper form. Once you can do the recommended amount of reps for that exercise, stop doing that one and move on to the next exercise in that progression list during your next workout.     Bridge Progression: The purpose is to practice pushing the leg back using the glutes while maintaining a neutral spine. Start with your foot close enough to touch the heel. Brace your core without letting your back arch or flatten, or rotate for single leg exercises. Squeeze the glutes and drive down through the heel. If you feel tightness in the low back, you need to either brace the core more, use the glutes more, or don't lift the hips quite as  high. Arms down is easier, arms up is harder.     1. Double leg bridge with arms up: 3 x 10 - 20        2. Bridge march with arms up: 3 x 20 Don't allow the hips to rotate.        3. Single leg bridge arms up: 3 x 10 - 15 each leg        4. Rotisserie Chicken Exercises:    This can be done with one leg on a chair or bench, or done from the single leg bridge position. Build up to 3 x 12.       Hip Abduction Progression:      Lateral control - This is one of the most common problems with runners and can contribute to many different injuries and wasted energy. Improving hip strength will help significantly. As your hips get stronger, think about actively squeezing your glutes when you run - that will help get these muscles firing. Do only 1 of these exercises per workout and advance to the next exercise once you can do the recommended amount of reps.     3. Seven way hips: Start with 3 x 2 - 3 reps and progress to 3 x 8 - 10 reps. Follow this link:  https://www.[employer].com/watch?v=YdenOdoz-MI      4. Standing hip hikes: 3 x 20  (bonus points: hold core tight, good posture with weight on forefoot, and toe yoga)        5. Amadou hip exercises: 3 x 8 - 12 (Start with 8 reps. Once you build up to 12, use a tighter band.)    A. Theraband pulling straight out to side.             B. Theraband pulling 45 degrees posterior.            C. Standing hip rotations with resistance:  https://www.[employer].com/watch?v=gfHmUIuDmG0          6. Monster Walks: 3 x 30 - 60 seconds. See video: https://www.[employer].com/watch?j=mxL773AIErk  You could also do monster walks going around a small square with 5 to 10 foot sides. Go around it in each direction.    7. Standing Clamshells: 3 x 8 - 12 reps  https://www.[employer].com/watch?v=IFCdKQeV0GD    Lower Leg Progression:    1. Double leg heel raises: 3 x 20 (besides just picking the heel up, make sure you also invert it, or turn it in, as you come up.)    2. Single leg heel raise: 3 x 15 (same as  above, the heel should invert as you come up.)    3. Bonus level: Eccentric calf raises: 3 x 15 - not sure if it helps tib post as much, but can give you bigger stronger Achilleswhich is essentially a big spring giving you free energy.      4. Rocker board: 3 x 20 in each direction (Remember to brace core and brace foot (toe yoga) and control the board with your ankle, not by moving your body. Its ok to lightly hold onto something for balance, but try working towards not holding on.)                  5. Single leg kettlebell press: 3 x 10 - 15      6. Single Leg Kettleball Swap: 3 x 30 seconds: See this link  https://www.[email or web address]/watch?v=hcXKajLSCQs      7. Foot Screws:       Plank Progression:    Healthier running and healthier spines starts with good posture. Maintaining good posture depends on awareness of spine position and the control, strength, and endurance to maintain it. Planks are a great way to build core strength and endurance in a spine neutral position.     1. First, build up to 3 sets of 40 - 60 second planks, including straight and side planks         Running & sports involve dynamic movement, so planks with movements have better carry over. Be sure to keep the spine in as much of a neutral position as possible.     2. Then back of the time to about 20 seconds and add movements to make it harder. Add 5 seconds each week. Here are just a few ideas:                 3. Carries are like moving planks that reinforce good posture. Be sure to keep the spine tall and straight. Each type of carry works things a little differently. Pick a different one to do each week and do three sets of 30 - 45 seconds.     A. Wagoners Carry: good for posture in general.      B. Suitcase Carry: good for lateral chain/oblique work, like a walking side plank.      C. s Carry: good for more back muscle activation and core control. Keep the ribs down and in to avoid over-arching the back.      Intermediate Glute Max  "Progression:     With these exercises:  - continue to focus on abdominal bracing  - think about using your foot muscles (toe yoga) to prevent the foot from collapsing in  - keep the middle of the kneecap lined up over the second toe  - good control is more important than the speed of the exercise or how many you do!  - OK to do more than one of these at a time, if your glutes can handle it.    1. Chair of Death squats: 3 x 15   Drive hips back, keeping tibia as vertical as possible. The bar Im holding is to remind me not to flex my spine on the way down, or arch my back on the way up. Think "sit back" rather than "squat down".        2. Lunges: 3 x 10 Keep torso upright and dont let knee go past toes. Do multi-directional lunges for bonus points.      3. Hip Hinges: 3 x 15 Use a braced core to keep the spine in a neutral position. A stick can help you to know if spine is staying in right position. Think hips back, hips forward. Ok to bend the knees slightly to take stretch off of the hamstrings.       4. Single leg deadlifts: 3 x 10    Remember to keep spine straight as well as the leg you are kicking back. Only once you are sure youre using good form should you drop the dominic and pick-up a dumbbell or barbell.         Stretching for Range of Motion:    Remember not to do static stretching before running or working out. Do something to warm up instead. Its best to stretch after runs or workouts, or any time on rest days. Stretch at least 4 days per week, 1 - 2 minutes per stretch. It will take 10 - 12 weeks to get the full benefit.     Hip flexors: Use whichever stretch you prefer, the kneeling or lunging type, or switch them up to keep it exciting.     rotate pelvis backwards and flatten the low back                       Hold this position for several minutes.              Remember to swing your arms BACK.           Advanced Strength Training for Endurance:     Strength workouts for runners, once or twice weekly " workout could look like this -  5 minute dynamic/active warmup  3 sets of:  3 - 6 reps of a hip hinge based move, 30 - 60 seconds rest, 5 reps of a plyo like rocket jumps, 2 min rest, repeat.  3 sets of:  3 - 6 reps squat based   Core exercise  5 - 10 reps of upper body movement  3 - 6 reps of another hip hinge based  10 - 20 seconds plyo move  Gluteus medius/hip stability movement  Stretch hip flexors and other tight spots    Use the 6 reps in the first week or two of a new training block, at around 65 - 75% of 1 rep max (1RM) (estimation is fine).  Week 3 go with 4 - 5 reps at 75 - 85% 1RM  Week 4 go with 2 - 3 reps at 85 - 95% 1RM  Start the cycle over every 4 weeks with new exercises.    Also, dont necessarily need to worry about finding 1RM as long you are going heavy enough that the last set is pretty difficult.    Hip hinge based:  Squat based:   Plyo:  Deadlifts   Back Squats   Explosive Mountain Climbers  Hip thrusts   Front Squats   Box Jumps  Maltese deadlift  Split Squats   Rocket Jumps  KB Swings   Weighted Lunges  Depth Jumps  Single leg deadlift  Push Press   Rebound Jumps  Trap bar deadlift  Cleans    Bounding          Hopping          A, B, C-skips          Short Sprints    C-skips: Look for this in the videos section of my Dr. Tracy Vogel Facebook page.

## 2020-07-10 DIAGNOSIS — R07.9 CHEST PAIN, UNSPECIFIED TYPE: Primary | ICD-10-CM

## 2020-10-06 ENCOUNTER — PATIENT MESSAGE (OUTPATIENT)
Dept: ADMINISTRATIVE | Facility: HOSPITAL | Age: 30
End: 2020-10-06

## 2021-03-25 ENCOUNTER — PATIENT MESSAGE (OUTPATIENT)
Dept: ADMINISTRATIVE | Facility: HOSPITAL | Age: 31
End: 2021-03-25

## 2021-04-29 ENCOUNTER — PATIENT MESSAGE (OUTPATIENT)
Dept: RESEARCH | Facility: HOSPITAL | Age: 31
End: 2021-04-29

## 2021-05-27 ENCOUNTER — IMMUNIZATION (OUTPATIENT)
Dept: PHARMACY | Facility: CLINIC | Age: 31
End: 2021-05-27

## 2021-05-27 DIAGNOSIS — Z23 NEED FOR VACCINATION: Primary | ICD-10-CM

## 2021-06-24 ENCOUNTER — IMMUNIZATION (OUTPATIENT)
Dept: PHARMACY | Facility: CLINIC | Age: 31
End: 2021-06-24

## 2021-06-24 DIAGNOSIS — Z23 NEED FOR VACCINATION: Primary | ICD-10-CM

## 2022-01-21 ENCOUNTER — IMMUNIZATION (OUTPATIENT)
Dept: PHARMACY | Facility: CLINIC | Age: 32
End: 2022-01-21
Payer: MEDICAID

## 2022-01-21 DIAGNOSIS — Z23 NEED FOR VACCINATION: Primary | ICD-10-CM

## 2022-01-31 ENCOUNTER — TELEPHONE (OUTPATIENT)
Dept: PSYCHIATRY | Facility: CLINIC | Age: 32
End: 2022-01-31
Payer: MEDICAID

## 2022-01-31 NOTE — TELEPHONE ENCOUNTER
Called pt to get him scheduled and advised him to get a referral from provider and we will give him a call to schedule.

## 2022-02-01 ENCOUNTER — PATIENT MESSAGE (OUTPATIENT)
Dept: INTERNAL MEDICINE | Facility: CLINIC | Age: 32
End: 2022-02-01
Payer: MEDICAID

## 2022-02-03 ENCOUNTER — OFFICE VISIT (OUTPATIENT)
Dept: INTERNAL MEDICINE | Facility: CLINIC | Age: 32
End: 2022-02-03
Payer: MEDICAID

## 2022-02-03 VITALS
HEART RATE: 73 BPM | WEIGHT: 190.69 LBS | TEMPERATURE: 98 F | DIASTOLIC BLOOD PRESSURE: 70 MMHG | SYSTOLIC BLOOD PRESSURE: 146 MMHG | HEIGHT: 72 IN | OXYGEN SATURATION: 100 % | BODY MASS INDEX: 25.83 KG/M2

## 2022-02-03 DIAGNOSIS — Z11.4 SCREENING FOR HIV (HUMAN IMMUNODEFICIENCY VIRUS): ICD-10-CM

## 2022-02-03 DIAGNOSIS — Z00.00 ROUTINE GENERAL MEDICAL EXAMINATION AT A HEALTH CARE FACILITY: Primary | ICD-10-CM

## 2022-02-03 DIAGNOSIS — Z11.59 NEED FOR HEPATITIS C SCREENING TEST: ICD-10-CM

## 2022-02-03 DIAGNOSIS — Z13.220 SCREENING FOR LIPOID DISORDERS: ICD-10-CM

## 2022-02-03 DIAGNOSIS — Z13.29 THYROID DISORDER SCREEN: ICD-10-CM

## 2022-02-03 DIAGNOSIS — F32.A MILD DEPRESSION: ICD-10-CM

## 2022-02-03 PROCEDURE — 3078F PR MOST RECENT DIASTOLIC BLOOD PRESSURE < 80 MM HG: ICD-10-PCS | Mod: CPTII,,, | Performed by: PHYSICIAN ASSISTANT

## 2022-02-03 PROCEDURE — 1160F PR REVIEW ALL MEDS BY PRESCRIBER/CLIN PHARMACIST DOCUMENTED: ICD-10-PCS | Mod: CPTII,,, | Performed by: PHYSICIAN ASSISTANT

## 2022-02-03 PROCEDURE — 3008F PR BODY MASS INDEX (BMI) DOCUMENTED: ICD-10-PCS | Mod: CPTII,,, | Performed by: PHYSICIAN ASSISTANT

## 2022-02-03 PROCEDURE — 1160F RVW MEDS BY RX/DR IN RCRD: CPT | Mod: CPTII,,, | Performed by: PHYSICIAN ASSISTANT

## 2022-02-03 PROCEDURE — 99213 OFFICE O/P EST LOW 20 MIN: CPT | Mod: PBBFAC | Performed by: PHYSICIAN ASSISTANT

## 2022-02-03 PROCEDURE — 99385 PREV VISIT NEW AGE 18-39: CPT | Mod: S$PBB,,, | Performed by: PHYSICIAN ASSISTANT

## 2022-02-03 PROCEDURE — 3078F DIAST BP <80 MM HG: CPT | Mod: CPTII,,, | Performed by: PHYSICIAN ASSISTANT

## 2022-02-03 PROCEDURE — 3008F BODY MASS INDEX DOCD: CPT | Mod: CPTII,,, | Performed by: PHYSICIAN ASSISTANT

## 2022-02-03 PROCEDURE — 99999 PR PBB SHADOW E&M-EST. PATIENT-LVL III: ICD-10-PCS | Mod: PBBFAC,,, | Performed by: PHYSICIAN ASSISTANT

## 2022-02-03 PROCEDURE — 1159F MED LIST DOCD IN RCRD: CPT | Mod: CPTII,,, | Performed by: PHYSICIAN ASSISTANT

## 2022-02-03 PROCEDURE — 1159F PR MEDICATION LIST DOCUMENTED IN MEDICAL RECORD: ICD-10-PCS | Mod: CPTII,,, | Performed by: PHYSICIAN ASSISTANT

## 2022-02-03 PROCEDURE — 99385 PR PREVENTIVE VISIT,NEW,18-39: ICD-10-PCS | Mod: S$PBB,,, | Performed by: PHYSICIAN ASSISTANT

## 2022-02-03 PROCEDURE — 3077F PR MOST RECENT SYSTOLIC BLOOD PRESSURE >= 140 MM HG: ICD-10-PCS | Mod: CPTII,,, | Performed by: PHYSICIAN ASSISTANT

## 2022-02-03 PROCEDURE — 3077F SYST BP >= 140 MM HG: CPT | Mod: CPTII,,, | Performed by: PHYSICIAN ASSISTANT

## 2022-02-03 PROCEDURE — 99999 PR PBB SHADOW E&M-EST. PATIENT-LVL III: CPT | Mod: PBBFAC,,, | Performed by: PHYSICIAN ASSISTANT

## 2022-02-03 RX ORDER — SERTRALINE HYDROCHLORIDE 25 MG/1
25 TABLET, FILM COATED ORAL DAILY
Qty: 30 TABLET | Refills: 5 | Status: SHIPPED | OUTPATIENT
Start: 2022-02-03 | End: 2022-03-30 | Stop reason: SDUPTHER

## 2022-02-03 NOTE — PROGRESS NOTES
Subjective:       Patient ID: Danisha Jones is a 31 y.o. male.    Chief Complaint: psychiatry referral and Annual Exam    Patient presents to clinic today for annual physical exam.    Review of Systems   Constitutional: Negative for chills, fatigue, fever and unexpected weight change.   HENT: Negative for congestion, dental problem, ear pain, hearing loss, rhinorrhea and trouble swallowing.    Eyes: Negative for pain and visual disturbance.   Respiratory: Negative for cough and shortness of breath.    Cardiovascular: Negative for palpitations and leg swelling.   Gastrointestinal: Negative for abdominal distention, abdominal pain, blood in stool, constipation, diarrhea, nausea and vomiting.   Genitourinary: Negative for difficulty urinating, scrotal swelling and testicular pain.   Musculoskeletal: Positive for arthralgias ( from exercising) and myalgias ( from exercisigin).   Skin: Negative for rash.   Neurological: Negative for dizziness, weakness, numbness and headaches.   Hematological: Negative for adenopathy. Does not bruise/bleed easily.   Psychiatric/Behavioral: Positive for dysphoric mood ( started around August 2021) and sleep disturbance. Negative for hallucinations and self-injury. The patient is nervous/anxious.          Objective:      Physical Exam  Vitals and nursing note reviewed.   Constitutional:       General: He is not in acute distress.     Appearance: He is well-developed.   HENT:      Head: Normocephalic and atraumatic.      Right Ear: Hearing, tympanic membrane, ear canal and external ear normal.      Left Ear: Hearing, tympanic membrane, ear canal and external ear normal.      Nose: Nose normal.      Mouth/Throat:      Lips: Pink.      Mouth: Mucous membranes are moist.      Pharynx: Oropharynx is clear. Uvula midline.   Eyes:      General: Lids are normal. No scleral icterus.     Conjunctiva/sclera: Conjunctivae normal.      Pupils: Pupils are equal, round, and reactive to light.   Neck:       Thyroid: No thyromegaly.   Cardiovascular:      Rate and Rhythm: Normal rate and regular rhythm.      Heart sounds: No murmur heard.  No friction rub. No gallop.    Pulmonary:      Effort: Pulmonary effort is normal.      Breath sounds: Normal breath sounds. No wheezing or rales.   Abdominal:      General: Bowel sounds are normal. There is no distension.      Palpations: Abdomen is soft. There is no mass.      Tenderness: There is no abdominal tenderness.   Musculoskeletal:         General: No tenderness. Normal range of motion.      Cervical back: Normal range of motion and neck supple.   Lymphadenopathy:      Cervical: No cervical adenopathy.   Skin:     General: Skin is warm and dry.      Findings: No rash.   Neurological:      Mental Status: He is alert.      Cranial Nerves: No cranial nerve deficit.      Gait: Gait normal.   Psychiatric:         Mood and Affect: Mood and affect normal.         Assessment:       1. Routine general medical examination at a health care facility    2. Mild depression    3. Screening for HIV (human immunodeficiency virus)    4. Need for hepatitis C screening test    5. Thyroid disorder screen    6. Screening for lipoid disorders        Plan:     Problem List Items Addressed This Visit        Psychiatric    Mild depression    Current Assessment & Plan     Start Zoloft 25mg, ref to psych; encouraged to continue running activities         Relevant Medications    sertraline (ZOLOFT) 25 MG tablet    Other Relevant Orders    Ambulatory referral/consult to Psychiatry      Other Visit Diagnoses     Routine general medical examination at a health care facility    -  Primary    Relevant Orders    Comprehensive Metabolic Panel    CBC Auto Differential    Screening for HIV (human immunodeficiency virus)        Relevant Orders    HIV 1/2 Ag/Ab (4th Gen)    Need for hepatitis C screening test        Relevant Orders    Hepatitis C Antibody    Thyroid disorder screen        Relevant Orders     TSH    Screening for lipoid disorders        Relevant Orders    Lipid Panel        Fasting labs ordered  Ref to psych for med mgmt/counseling, will start Zoloft 25mg  Health Maintenance reviewed/updated.

## 2022-02-07 ENCOUNTER — LAB VISIT (OUTPATIENT)
Dept: LAB | Facility: HOSPITAL | Age: 32
End: 2022-02-07
Payer: MEDICAID

## 2022-02-07 DIAGNOSIS — Z11.4 SCREENING FOR HIV (HUMAN IMMUNODEFICIENCY VIRUS): ICD-10-CM

## 2022-02-07 DIAGNOSIS — Z11.59 NEED FOR HEPATITIS C SCREENING TEST: ICD-10-CM

## 2022-02-07 DIAGNOSIS — Z00.00 ROUTINE GENERAL MEDICAL EXAMINATION AT A HEALTH CARE FACILITY: ICD-10-CM

## 2022-02-07 DIAGNOSIS — Z13.220 SCREENING FOR LIPOID DISORDERS: ICD-10-CM

## 2022-02-07 DIAGNOSIS — Z13.29 THYROID DISORDER SCREEN: ICD-10-CM

## 2022-02-07 LAB
ALBUMIN SERPL BCP-MCNC: 4.5 G/DL (ref 3.5–5.2)
ALP SERPL-CCNC: 46 U/L (ref 55–135)
ALT SERPL W/O P-5'-P-CCNC: 20 U/L (ref 10–44)
ANION GAP SERPL CALC-SCNC: 9 MMOL/L (ref 8–16)
AST SERPL-CCNC: 29 U/L (ref 10–40)
BILIRUB SERPL-MCNC: 0.5 MG/DL (ref 0.1–1)
BUN SERPL-MCNC: 17 MG/DL (ref 6–20)
CALCIUM SERPL-MCNC: 10.3 MG/DL (ref 8.7–10.5)
CHLORIDE SERPL-SCNC: 106 MMOL/L (ref 95–110)
CHOLEST SERPL-MCNC: 160 MG/DL (ref 120–199)
CHOLEST/HDLC SERPL: 2.7 {RATIO} (ref 2–5)
CO2 SERPL-SCNC: 26 MMOL/L (ref 23–29)
CREAT SERPL-MCNC: 1 MG/DL (ref 0.5–1.4)
EST. GFR  (AFRICAN AMERICAN): >60 ML/MIN/1.73 M^2
EST. GFR  (NON AFRICAN AMERICAN): >60 ML/MIN/1.73 M^2
GLUCOSE SERPL-MCNC: 84 MG/DL (ref 70–110)
HDLC SERPL-MCNC: 59 MG/DL (ref 40–75)
HDLC SERPL: 36.9 % (ref 20–50)
LDLC SERPL CALC-MCNC: 94.6 MG/DL (ref 63–159)
NONHDLC SERPL-MCNC: 101 MG/DL
POTASSIUM SERPL-SCNC: 4 MMOL/L (ref 3.5–5.1)
PROT SERPL-MCNC: 7.2 G/DL (ref 6–8.4)
SODIUM SERPL-SCNC: 141 MMOL/L (ref 136–145)
TRIGL SERPL-MCNC: 32 MG/DL (ref 30–150)
TSH SERPL DL<=0.005 MIU/L-ACNC: 0.54 UIU/ML (ref 0.4–4)

## 2022-02-07 PROCEDURE — 84443 ASSAY THYROID STIM HORMONE: CPT | Performed by: PHYSICIAN ASSISTANT

## 2022-02-07 PROCEDURE — 86803 HEPATITIS C AB TEST: CPT | Performed by: PHYSICIAN ASSISTANT

## 2022-02-07 PROCEDURE — 36415 COLL VENOUS BLD VENIPUNCTURE: CPT | Performed by: PHYSICIAN ASSISTANT

## 2022-02-07 PROCEDURE — 85025 COMPLETE CBC W/AUTO DIFF WBC: CPT | Performed by: PHYSICIAN ASSISTANT

## 2022-02-07 PROCEDURE — 80053 COMPREHEN METABOLIC PANEL: CPT | Performed by: PHYSICIAN ASSISTANT

## 2022-02-07 PROCEDURE — 80061 LIPID PANEL: CPT | Performed by: PHYSICIAN ASSISTANT

## 2022-02-07 PROCEDURE — 87389 HIV-1 AG W/HIV-1&-2 AB AG IA: CPT | Performed by: PHYSICIAN ASSISTANT

## 2022-02-08 DIAGNOSIS — D72.819 LEUKOPENIA, UNSPECIFIED TYPE: Primary | ICD-10-CM

## 2022-02-08 LAB
BASOPHILS # BLD AUTO: 0.02 K/UL (ref 0–0.2)
BASOPHILS NFR BLD: 0.5 % (ref 0–1.9)
DIFFERENTIAL METHOD: ABNORMAL
EOSINOPHIL # BLD AUTO: 0.1 K/UL (ref 0–0.5)
EOSINOPHIL NFR BLD: 1.4 % (ref 0–8)
ERYTHROCYTE [DISTWIDTH] IN BLOOD BY AUTOMATED COUNT: 11.9 % (ref 11.5–14.5)
HCT VFR BLD AUTO: 40.3 % (ref 40–54)
HCV AB SERPL QL IA: NEGATIVE
HGB BLD-MCNC: 12.1 G/DL (ref 14–18)
HIV 1+2 AB+HIV1 P24 AG SERPL QL IA: NEGATIVE
IMM GRANULOCYTES # BLD AUTO: 0.01 K/UL (ref 0–0.04)
IMM GRANULOCYTES NFR BLD AUTO: 0.3 % (ref 0–0.5)
LYMPHOCYTES # BLD AUTO: 2 K/UL (ref 1–4.8)
LYMPHOCYTES NFR BLD: 53.8 % (ref 18–48)
MCH RBC QN AUTO: 26.6 PG (ref 27–31)
MCHC RBC AUTO-ENTMCNC: 30 G/DL (ref 32–36)
MCV RBC AUTO: 89 FL (ref 82–98)
MONOCYTES # BLD AUTO: 0.2 K/UL (ref 0.3–1)
MONOCYTES NFR BLD: 5.8 % (ref 4–15)
NEUTROPHILS # BLD AUTO: 1.4 K/UL (ref 1.8–7.7)
NEUTROPHILS NFR BLD: 38.2 % (ref 38–73)
NRBC BLD-RTO: 0 /100 WBC
PLATELET # BLD AUTO: 244 K/UL (ref 150–450)
PMV BLD AUTO: 10.4 FL (ref 9.2–12.9)
RBC # BLD AUTO: 4.55 M/UL (ref 4.6–6.2)
WBC # BLD AUTO: 3.64 K/UL (ref 3.9–12.7)

## 2022-02-22 ENCOUNTER — PATIENT MESSAGE (OUTPATIENT)
Dept: INTERNAL MEDICINE | Facility: CLINIC | Age: 32
End: 2022-02-22
Payer: MEDICAID

## 2022-02-25 ENCOUNTER — HOSPITAL ENCOUNTER (OUTPATIENT)
Dept: CARDIOLOGY | Facility: HOSPITAL | Age: 32
Discharge: HOME | End: 2022-02-25
Payer: MEDICAID

## 2022-02-25 ENCOUNTER — OFFICE VISIT (OUTPATIENT)
Dept: INTERNAL MEDICINE | Facility: CLINIC | Age: 32
End: 2022-02-25
Payer: MEDICAID

## 2022-02-25 ENCOUNTER — HOSPITAL ENCOUNTER (OUTPATIENT)
Dept: RADIOLOGY | Facility: HOSPITAL | Age: 32
Discharge: HOME | End: 2022-02-25
Attending: PHYSICIAN ASSISTANT
Payer: MEDICAID

## 2022-02-25 VITALS
SYSTOLIC BLOOD PRESSURE: 138 MMHG | WEIGHT: 189.13 LBS | DIASTOLIC BLOOD PRESSURE: 72 MMHG | HEART RATE: 70 BPM | HEIGHT: 72 IN | BODY MASS INDEX: 25.62 KG/M2 | OXYGEN SATURATION: 98 % | TEMPERATURE: 97 F

## 2022-02-25 DIAGNOSIS — R06.09 EXERTIONAL DYSPNEA: ICD-10-CM

## 2022-02-25 DIAGNOSIS — R00.9 HEART RATE PROBLEM: ICD-10-CM

## 2022-02-25 DIAGNOSIS — R00.9 HEART RATE PROBLEM: Primary | ICD-10-CM

## 2022-02-25 PROCEDURE — 71046 X-RAY EXAM CHEST 2 VIEWS: CPT | Mod: TC

## 2022-02-25 PROCEDURE — 99213 OFFICE O/P EST LOW 20 MIN: CPT | Mod: S$PBB,,, | Performed by: PHYSICIAN ASSISTANT

## 2022-02-25 PROCEDURE — 3008F BODY MASS INDEX DOCD: CPT | Mod: CPTII,,, | Performed by: PHYSICIAN ASSISTANT

## 2022-02-25 PROCEDURE — 99999 PR PBB SHADOW E&M-EST. PATIENT-LVL IV: ICD-10-PCS | Mod: PBBFAC,,, | Performed by: PHYSICIAN ASSISTANT

## 2022-02-25 PROCEDURE — 93010 ELECTROCARDIOGRAM REPORT: CPT | Mod: ,,, | Performed by: INTERNAL MEDICINE

## 2022-02-25 PROCEDURE — 93005 ELECTROCARDIOGRAM TRACING: CPT

## 2022-02-25 PROCEDURE — 1160F PR REVIEW ALL MEDS BY PRESCRIBER/CLIN PHARMACIST DOCUMENTED: ICD-10-PCS | Mod: CPTII,,, | Performed by: PHYSICIAN ASSISTANT

## 2022-02-25 PROCEDURE — 71046 X-RAY EXAM CHEST 2 VIEWS: CPT | Mod: 26,,, | Performed by: RADIOLOGY

## 2022-02-25 PROCEDURE — 71046 XR CHEST PA AND LATERAL: ICD-10-PCS | Mod: 26,,, | Performed by: RADIOLOGY

## 2022-02-25 PROCEDURE — 3078F PR MOST RECENT DIASTOLIC BLOOD PRESSURE < 80 MM HG: ICD-10-PCS | Mod: CPTII,,, | Performed by: PHYSICIAN ASSISTANT

## 2022-02-25 PROCEDURE — 1160F RVW MEDS BY RX/DR IN RCRD: CPT | Mod: CPTII,,, | Performed by: PHYSICIAN ASSISTANT

## 2022-02-25 PROCEDURE — 3075F SYST BP GE 130 - 139MM HG: CPT | Mod: CPTII,,, | Performed by: PHYSICIAN ASSISTANT

## 2022-02-25 PROCEDURE — 3075F PR MOST RECENT SYSTOLIC BLOOD PRESS GE 130-139MM HG: ICD-10-PCS | Mod: CPTII,,, | Performed by: PHYSICIAN ASSISTANT

## 2022-02-25 PROCEDURE — 3008F PR BODY MASS INDEX (BMI) DOCUMENTED: ICD-10-PCS | Mod: CPTII,,, | Performed by: PHYSICIAN ASSISTANT

## 2022-02-25 PROCEDURE — 93010 EKG 12-LEAD: ICD-10-PCS | Mod: ,,, | Performed by: INTERNAL MEDICINE

## 2022-02-25 PROCEDURE — 1159F MED LIST DOCD IN RCRD: CPT | Mod: CPTII,,, | Performed by: PHYSICIAN ASSISTANT

## 2022-02-25 PROCEDURE — 99213 PR OFFICE/OUTPT VISIT, EST, LEVL III, 20-29 MIN: ICD-10-PCS | Mod: S$PBB,,, | Performed by: PHYSICIAN ASSISTANT

## 2022-02-25 PROCEDURE — 99999 PR PBB SHADOW E&M-EST. PATIENT-LVL IV: CPT | Mod: PBBFAC,,, | Performed by: PHYSICIAN ASSISTANT

## 2022-02-25 PROCEDURE — 99214 OFFICE O/P EST MOD 30 MIN: CPT | Mod: PBBFAC,25 | Performed by: PHYSICIAN ASSISTANT

## 2022-02-25 PROCEDURE — 1159F PR MEDICATION LIST DOCUMENTED IN MEDICAL RECORD: ICD-10-PCS | Mod: CPTII,,, | Performed by: PHYSICIAN ASSISTANT

## 2022-02-25 PROCEDURE — 3078F DIAST BP <80 MM HG: CPT | Mod: CPTII,,, | Performed by: PHYSICIAN ASSISTANT

## 2022-02-25 NOTE — PROGRESS NOTES
"Subjective:       Patient ID: Danisha Jones is a 31 y.o. male.    Chief Complaint: Follow-up (Concerns about HR when doing strenuous exercise)    Danisha Jones is a 31 y.o. male who presents today with complaints of Follow-up (Concerns about HR when doing strenuous exercise). Pt reports since he had Covid infection mid January 2022, he has noticed higher elevations of his heart rate with exertional dyspnea. Pt runs regularly and notices changes in his HR. Prior to Covid his heart rate for an "easy run" would maintain at 160bpm and he was able to complete a 3 mile run in about 30 minutes. Post-Covid his HR goes in the 170-180s range causing him to slow down during easy runs and takes him about 7-8 minutes longer to complete his 3 mile run. He denies any chest discomfort during rest or exercise. He denies palpitations or leg swelling. No treatments tried. When he had Covid he did not require any hospitalization. He also reports resting HR was usually 55-60 and runs higher now, 70s.     Review of Systems   Constitutional: Negative for chills, fatigue, fever and unexpected weight change.   Respiratory: Positive for shortness of breath. Negative for cough, chest tightness and wheezing.    Cardiovascular: Negative for chest pain, palpitations and leg swelling.   Musculoskeletal: Negative for myalgias.   Neurological: Negative for headaches.       Objective:      Physical Exam  Vitals and nursing note reviewed.   Constitutional:       General: He is not in acute distress.     Appearance: He is well-developed.   HENT:      Head: Normocephalic and atraumatic.   Eyes:      General: Lids are normal. No scleral icterus.     Extraocular Movements: Extraocular movements intact.      Conjunctiva/sclera: Conjunctivae normal.   Cardiovascular:      Rate and Rhythm: Normal rate and regular rhythm.      Heart sounds: No murmur heard.    No friction rub. No gallop.   Pulmonary:      Effort: Pulmonary effort is normal.      Breath " sounds: Normal breath sounds. No decreased breath sounds, wheezing, rhonchi or rales.   Neurological:      Mental Status: He is alert.      Cranial Nerves: No cranial nerve deficit.      Gait: Gait normal.   Psychiatric:         Mood and Affect: Mood and affect normal.         Assessment:       1. Heart rate problem    2. Exertional dyspnea        Plan:     Problem List Items Addressed This Visit    None     Visit Diagnoses     Heart rate problem    -  Primary    Relevant Orders    Ambulatory referral/consult to Cardiology    EKG 12-lead    X-Ray Chest PA And Lateral    Exertional dyspnea        Relevant Orders    Ambulatory referral/consult to Cardiology    X-Ray Chest PA And Lateral          Will refer to Cards for second opinion.

## 2022-02-26 ENCOUNTER — PATIENT MESSAGE (OUTPATIENT)
Dept: INTERNAL MEDICINE | Facility: CLINIC | Age: 32
End: 2022-02-26
Payer: MEDICAID

## 2022-03-01 ENCOUNTER — LAB VISIT (OUTPATIENT)
Dept: LAB | Facility: HOSPITAL | Age: 32
End: 2022-03-01
Attending: PHYSICIAN ASSISTANT
Payer: MEDICAID

## 2022-03-01 ENCOUNTER — OFFICE VISIT (OUTPATIENT)
Dept: PSYCHIATRY | Facility: CLINIC | Age: 32
End: 2022-03-01
Payer: MEDICAID

## 2022-03-01 DIAGNOSIS — D72.819 LEUKOPENIA, UNSPECIFIED TYPE: ICD-10-CM

## 2022-03-01 DIAGNOSIS — F32.1 CURRENT MODERATE EPISODE OF MAJOR DEPRESSIVE DISORDER WITHOUT PRIOR EPISODE: ICD-10-CM

## 2022-03-01 LAB
BASOPHILS # BLD AUTO: 0.02 K/UL (ref 0–0.2)
BASOPHILS NFR BLD: 0.3 % (ref 0–1.9)
DIFFERENTIAL METHOD: ABNORMAL
EOSINOPHIL # BLD AUTO: 0.1 K/UL (ref 0–0.5)
EOSINOPHIL NFR BLD: 1 % (ref 0–8)
ERYTHROCYTE [DISTWIDTH] IN BLOOD BY AUTOMATED COUNT: 11.4 % (ref 11.5–14.5)
HCT VFR BLD AUTO: 42.2 % (ref 40–54)
HGB BLD-MCNC: 13.2 G/DL (ref 14–18)
IMM GRANULOCYTES # BLD AUTO: 0.01 K/UL (ref 0–0.04)
IMM GRANULOCYTES NFR BLD AUTO: 0.2 % (ref 0–0.5)
LYMPHOCYTES # BLD AUTO: 2.7 K/UL (ref 1–4.8)
LYMPHOCYTES NFR BLD: 43.2 % (ref 18–48)
MCH RBC QN AUTO: 26.5 PG (ref 27–31)
MCHC RBC AUTO-ENTMCNC: 31.3 G/DL (ref 32–36)
MCV RBC AUTO: 85 FL (ref 82–98)
MONOCYTES # BLD AUTO: 0.4 K/UL (ref 0.3–1)
MONOCYTES NFR BLD: 5.8 % (ref 4–15)
NEUTROPHILS # BLD AUTO: 3.1 K/UL (ref 1.8–7.7)
NEUTROPHILS NFR BLD: 49.5 % (ref 38–73)
NRBC BLD-RTO: 0 /100 WBC
PLATELET # BLD AUTO: 263 K/UL (ref 150–450)
PMV BLD AUTO: 10.6 FL (ref 9.2–12.9)
RBC # BLD AUTO: 4.99 M/UL (ref 4.6–6.2)
WBC # BLD AUTO: 6.18 K/UL (ref 3.9–12.7)

## 2022-03-01 PROCEDURE — 1159F MED LIST DOCD IN RCRD: CPT | Mod: HP,HB,CPTII, | Performed by: PSYCHOLOGIST

## 2022-03-01 PROCEDURE — 90792 PR PSYCHIATRIC DIAGNOSTIC EVALUATION W/MEDICAL SERVICES: ICD-10-PCS | Mod: HP,HB,S$PBB, | Performed by: PSYCHOLOGIST

## 2022-03-01 PROCEDURE — 36415 COLL VENOUS BLD VENIPUNCTURE: CPT | Performed by: PHYSICIAN ASSISTANT

## 2022-03-01 PROCEDURE — 1159F PR MEDICATION LIST DOCUMENTED IN MEDICAL RECORD: ICD-10-PCS | Mod: HP,HB,CPTII, | Performed by: PSYCHOLOGIST

## 2022-03-01 PROCEDURE — 99999 PR PBB SHADOW E&M-EST. PATIENT-LVL II: CPT | Mod: PBBFAC,HB,, | Performed by: PSYCHOLOGIST

## 2022-03-01 PROCEDURE — 99212 OFFICE O/P EST SF 10 MIN: CPT | Mod: PBBFAC | Performed by: PSYCHOLOGIST

## 2022-03-01 PROCEDURE — 90792 PSYCH DIAG EVAL W/MED SRVCS: CPT | Mod: HP,HB,S$PBB, | Performed by: PSYCHOLOGIST

## 2022-03-01 PROCEDURE — 99999 PR PBB SHADOW E&M-EST. PATIENT-LVL II: ICD-10-PCS | Mod: PBBFAC,HB,, | Performed by: PSYCHOLOGIST

## 2022-03-01 PROCEDURE — 85025 COMPLETE CBC W/AUTO DIFF WBC: CPT | Performed by: PHYSICIAN ASSISTANT

## 2022-03-01 NOTE — PATIENT INSTRUCTIONS
"[facility]   PATIENT INFORMATION    We appreciate the opportunity to participate in your medical care and hope the following protocols will make it easier for you to receive quality treatment in our department.    PUNCTUALITY: Your appointment is scheduled for a fixed amount of time, reserved especially for you.  To get the benefit of your appointment, please arrive at least 15 minutes early to allow time for traffic, parking and registration.  Should you arrive more than 15 minutes late to your appointment, you will be rescheduled in order to assure your clinician has adequate time to assess you and provide helpful care.      APPOINTMENTS: Appointments are made by the nursing/front office staff or through the patient portal. Providers do not have access  to schedule appointments. Walk in appointments are not available. FOR EMERGENCIES, PLEASE GO THE CLOSEST EMERGENCY ROOM.    CANCELLATION/MISSED APPOINTMENTS:   In order to receive quality care, all appointments must be kept.  If you are unable to keep an appointment, please reschedule at least 3 days prior if possible. Late cancellations (within 24 hours of the appointment) and repeated no-show appointments may result in dismissal from the clinic. After two no show/late cancellation visits, you will receive a notice letter, alerting you to keep visits to prevent department dismissal. If another visit is missed after receipt of the notice, you will be discharged from the clinic. This policy is in effect to allow for other individuals on a long waiting list to be seen as soon as possible. Unlike other branches of medicine where several individuals can be scheduled in a 30 minute time slot, only one individual can be scheduled in any time slot in Psychiatry.     MESSAGES: For simple questions/concerns, you may contact your individual providers electronically through the "[facility]" portal or by calling [phone] " with messages relayed via office staff. If relevant, include pharmacy name and phone number, date of last visit and next scheduled visit, phone number where you can be reached throughout the day, and whether leaving a voicemail or message on an answering machine is acceptable. Messages will be returned by the Medical Assistant or Office Staff after your provider has reviewed the message.  Please allow 24 hours for a returned message before leaving another message. Messages will be checked each workday (Monday through Friday) during office hours (8:00 a.m. and 5:00 p.m.) and returned at most within one business day.  You may leave a non-urgent message after hours. Note that psychotherapy and medication management are not appropriate by telephone or the patient portal.    PRESCRIPTION REFILLS:  Please communicate with your prescriber about any refills you need during your appointment. You may also request refills through the [facility] portal (preferred) or by calling the clinic. Prescriptions will be filled during office hours.     Please do not wait until you are completely out of medication to request refills. Same day refills are not always possible. Patients may experience symptoms of withdrawal if they run out of medications. The patient assumes all responsibility when there is an issue with non-compliance with follow-up appointments and medications.  Some medications are controlled and regulated by the FDA and [place]. Some of these medications can not be refilled before 30 days and require a face to face appointment.     PAPERWORK REQUESTS: If you have any forms or letters that need to be completed by your doctor, please present these at the beginning of the appointment to ensure that information needed to complete them is obtained during the office visit. Paperwork will be returned within 7-10 business days. Staff will call you to  the paperwork when completed.    SPECIAL EVALUATIONS: Please note that our  "department is treatment-focused. As such, we focus on treatment-oriented evaluations and do not perform specialty or "forensic" evaluations. Examples are listed below.    Disability: We do not do disability evaluations.  Please contact Social Security Administration for evaluations and determinations. You will then sign releases allowing for records from your treatment providers to be forwarded to Social Security Administration to use in their evaluation.  Gun Permit: We do not offer Sound Judgment Evaluations or assessments leading to gun ownership, nor do we fill out or file paperwork relevant to owning, concealing or purchasing a firearm.  Emotional Support     Animals (PROSPER): We do not provide documentation, including letters, to aid in the acclamation that an Emotional Support Animal is required. Note that ESAs are not trained to perform tasks or recognize particular signs or symptoms. Rather, they are distinguished by the close, emotional, and supportive bond between the animal and the owner.       SAMPLES: We do not provide samples of any medications. If you have financial difficulties and are on a limited income, you may qualify for Patient Assistance Programs from various pharmaceutical companies. This will require that you complete paperwork with your financial information, but this does not guarantee that the company will approve the application. Alternative medication options can be discussed.    REFERRALS/COORDINATION: You will be referred to other providers if we feel unable to adequately diagnose or treat your particular condition, or if collaboration with another provider would allow for better management of your condition.     This document is for information purposes only. Please refer to the full disclaimer and copyright statement available at http://www.cci.health.wa.gov.au regarding the information from this website before making use of such information.  See website www.cci.health.wa.gov.au " for more handouts and resources.    What is Sleep Hygiene?  Sleep hygiene is the term used to describe good sleep habits. Considerable research has gone into developing a set of guidelines and tips which are designed to enhance good sleeping, and there is much evidence to suggest that these strategies can provide long-term solutions to sleep difficulties. There are many medications which are used to treat insomnia,  but these tend to be only effective in the short-term. Ongoing use of sleeping pills may lead to dependence and interfere with developing good sleep habits independent of medication,  thereby prolonging sleep difficulties. Talk to your health professional about what is right for you, but we recommend good sleep hygiene as an important part of treating insomnia,  either with other strategies such as medication or cognitive therapy or alone.    Sleep Hygiene Tips  1) Get regular. One of the best ways to train your body to sleep well is to go to bed and get up at more or less the same time every day, even on weekends and days off! This regular rhythm will make you feel better and will give your body something to work from.  2) Sleep when sleepy. Only try to sleep when you actually feel tired or sleepy, rather than spending too much time awake in bed.  3) Get up & try again. If you havent been able to get to sleep after about 20 minutes or more, get up and do something calming or boring until you feel sleepy, then return to bed and try again. Sit quietly on the couch with the lights off (bright light will tell your brain that it is time to wake up), or read something boring like the phone book. Avoid doing anything that is too stimulating or interesting, as this will wake you up even more.  4) Avoid caffeine & nicotine. It is best to avoid consuming any caffeine (in coffee, tea, cola drinks, chocolate, and some medications) or nicotine (cigarettes) for at least 4-6 hours before going to bed. These  substances act as stimulants and interfere with the ability to fall asleep   5) Avoid alcohol. It is also best to avoid alcohol for at least 4-6 hours before going to bed. Many people believe that alcohol is relaxing and helps them to get to sleep at first, but it actually interrupts the quality of sleep.  6) Bed is for sleeping. Try not to use your bed for anything other than sleeping and sex, so that your body comes to associate bed with sleep. If you use bed as a place to watch TV, eat, read, work on your laptop, pay bills, and other things, your body will not learn this connection.  7) No naps. It is best to avoid taking naps during the day, to make sure that you are tired at bedtime. If you cant make it through the day without a nap, make sure it is for less than an hour and before 3pm.  8) Sleep rituals. You can develop your own rituals of things to remind your body that it is time to sleep - some people find it useful to do relaxing stretches or breathing exercises for 15 minutes before bed each night, or sit calmly with a cup of caffeine-free tea.  9) Bathtime. Having a hot bath 1-2 hours before bedtime can be useful, as it will raise your body temperature, causing you to feel sleepy as your body temperature drops again. Research shows that sleepiness is associated with a drop in body temperature.  10) No clock-watching. Many people who struggle with sleep tend to watch the clock too much. Frequently checking the clock during the night can wake you up (especially if you turn  on the light to read the time) and reinforces negative thoughts such as Oh no, look how late it is, Ill never get to sleep or its so early, I have only slept for 5 hours, this is  terrible.  11) Use a sleep diary. This worksheet can be a useful way of making sure you have the right facts about your sleep, rather than making assumptions. Because a diary involves watching  the clock (see point 10) it is a good idea to only use it  for two weeks to get an idea of what is going and then perhaps two months down the track to see how you are progressing.  12) Exercise. Regular exercise is a good idea to help with good sleep, but try not to do strenuous exercise in the 4 hours before bedtime. Morning walks are a great way to start the day feeling refreshed!  13) Eat right. A healthy, balanced diet will help you to sleep well, but timing is important. Some people find that a very empty stomach at bedtime is distracting, so it can be useful  to have a light snack, but a heavy meal soon before bed can also interrupt sleep. Some people recommend a warm glass of milk, which contains tryptophan, which acts as a natural  sleep inducer.  14) The right space. It is very important that your bed and bedroom are quiet and comfortable for sleeping. A cooler room with enough blankets to stay warm is best, and make sure you have curtains or an eyemask to block out early morning light and earplugs if there is noise outside your room.  15) Keep daytime routine the same. Even if you have a bad night sleep and are tired it is important that you try to keep your daytime activities the same as you had planned. That is,  dont avoid activities because you feel tired. This can reinforce the insomnia.     Call In if problems  Call Report Side Effects   Encouraged to follow up with primary care / Gen Med MD for continued monitoring of general health and wellness  Call 911 Or go to ER if Acute Concerns (especially if any thoughts of harm to self or other)     National [employer]ute of Mental Health  Mental Health Medications: Antidepressants    What are the side effects?    Antidepressants may cause mild side effects that usually do not last long. Any unusual reactions or side effects should be reported to a doctor immediately.  The most common side effects associated with SSRIs and SNRIs include:   Headache, which usually goes away within a few days.   Nausea (feeling sick to  your stomach), which usually goes away within a few days.   Sleeplessness or drowsiness, which may happen during the first few weeks but then goes away. Sometimes the medication dose needs to be reduced or the time of day it is taken needs to be adjusted to help lessen these side effects.   Agitation (feeling jittery).   Sexual problems, which can affect both men and women and may include reduced sex drive, and problems having and enjoying sex.    Tricyclic antidepressants can cause side effects, including:   Dry mouth    Constipation    Bladder problems. It may be hard to empty the bladder, or the urine stream may not be as strong as usual. Older men with enlarged prostate conditions may be more affected.  Sexual problems, which can affect both men and women and may include reduced sex drive, and problems having and enjoying sex.   Blurred vision, which usually goes away quickly.   Drowsiness. Usually, antidepressants that make you drowsy are taken at bedtime.    People taking MAOIs need to be careful about the foods they eat and the medicines they take. Foods and medicines that contain high levels of a chemical called tyramine are dangerous for people taking MAOIs. Tyramine is found in some cheeses, luz elena, and pickles. The chemical is also in some medications, including decongestants and over-the-counter cold medicine.    Mixing MAOIs and tyramine can cause a sharp increase in blood pressure, which can lead to stroke. People taking MAOIs should ask their doctors for a complete list of foods, medicines, and other substances to avoid. An MAOI skin patch has recently been developed and may help reduce some of these risks. A doctor can help a person figure out if a patch or a pill will work for him or her.    How should antidepressants be taken?    People taking antidepressants need to follow their doctors directions. The medication should be taken in the right dose for the right amount of time. It can take three or  four weeks until the medicine takes effect. Some people take the medications for a short time, and some people take them for much longer periods. People with long-term or severe depression may need to take medication for a long time.    Once a person is taking antidepressants, it is important not to stop taking them without the help of a doctor. Sometimes people taking antidepressants feel better and stop taking the medication too soon, and the depression may return. When it is time to stop the medication, the doctor will help the person slowly and safely decrease the dose. Its important to give the body time to adjust to the change. People dont get addicted, or hooked, on the medications, but stopping them abruptly can cause withdrawal symptoms.    FDA warning on antidepressants    Antidepressants are safe and popular, but some studies have suggested that they may have unintentional effects, especially in young people. In 2004, the FDA looked at published and unpublished data on trials of antidepressants that involved nearly 4,400 children and adolescents. They found that 4 percent of those taking antidepressants thought about or tried suicide (although no suicides occurred), compared to  2 percent of those receiving placebos (sugar pill).    In 2005, the FDA decided to adopt a black box warning label--the most serious type of warning-- on all antidepressant medications. The warning says there is an increased risk of suicidal thinking or attempts in children and adolescents taking antidepressants. In 2007, the FDA proposed that makers of all antidepressant medications extend the warning to include young adults up through age 24.    The warning also says that patients of all ages taking antidepressants should be watched closely, especially during the first few weeks of treatment. Possible side effects to look for are depression that gets worse, suicidal thinking or behavior,  or any unusual changes in behavior  such as trouble sleeping, agitation, or withdrawal from normal social situations. Families and caregivers should report any changes to the doctor. The latest information from the FDA can be found at http://www.fda.gov.    Results of a comprehensive review of pediatric trials conducted between 1988 and 2006 suggested that the benefits of antidepressant medications likely outweigh their risks to children and adolescents with major depression and anxiety disorders. The study was funded in part by [facility].    Finally, the FDA has warned that combining the newer SSRI or SNRI antidepressants with one or more serotonin based medication - such as the commonly-used triptan medications used to treat migraine headaches - could cause a life- threatening illness called serotonin syndrome.    A person with serotonin syndrome may be agitated, have hallucinations (see or hear things that are not real), have a high temperature, or have unusual blood pressure changes. Serotonin syndrome is usually associated with the older antidepressants called MAOIs, but it can happen with the newer antidepressants as well, if they are mixed with the wrong medications.        Shiloh Mo, PhD, MP  Advanced Practice Medical Psychologist  [facility]  [place] [address]  [phone]   [phone] fax

## 2022-03-01 NOTE — LETTER
"March 1, 2022    Gladys Kessler PA-C  [address] [place] [address]       [facility]  [address]  [place] [address]  Phone: [phone]  Fax: [phone]   Patient: Danisha Jones   MR Number: [id number]   YOB: 1990   Date of Visit: 3/1/2022     Dear Ms. Kessler:    Thank you for referring Danisha Jones to me for evaluation. Below are the relevant portions of my assessment and plan of care.    Danisha attended his visit. He has no history of psychiatric treatment. He had been physically active and then was injured (over-use?) and was unable to run. This seemed to trigger a depressive episode, especially when combined with changes with work and COVID. There is the possibility of an underlying mood component (aunt had bipolar), though his "highs" do not seem to have risen to full marilyn. He has not noticed changes with this since starting Zoloft, though we did discuss this as a possibility today. He will continue to monitor. He has upcoming PCP and cardiology visits--has had physical symptoms that require further evaluation. He is an excellent candidate for therapy and is willing to engage. At this time, we are continuing his Zoloft at 25 mg.  · Medication Management: Discussed risks, benefits, and alternatives to treatment plan documented above with patient. I answered all patient questions related to this plan, and patient expressed understanding and agreement.   continue Zoloft 25 mg  · therapy referral in clinic   · Sleep hygiene  Follow up in about 4 weeks (around 3/29/2022) for medication management.     If you have questions, please do not hesitate to call me. I look forward to following Danisha along with you.    Sincerely,    Shiloh Mo, PhD, MPAP   Advanced Practice Medical Psychologist      CC  MD Angie Shafer, ISAK     "

## 2022-03-01 NOTE — PROGRESS NOTES
"PSYCHIATRIC EVALUATION     Disclaimer: Evaluation and treatment is based on information presented to date. Any new information may affect assessment and findings.     Name: Danisha Jones  Age: 31 y.o.  : 1990    Preferred Name: Danisha  Gender Identity: cis male  Preferred Pronouns: he/him    Referring provider: Gladys Kessler PA-C    Reason for Encounter:  Depression    History of Present Illness: Danisha reported that he has been depressed over the last year because he has not been working. He said that he had injuries that interferes with his ability to run. He reported that his injury started around August of last year--prior to that, he was running 100 miles a month. He reported that he has been working on getting back into running--taking things slowly. He denied any history of psychiatric treatment.    He started Zoloft about a month ago--he reported that he did not experience "the drop after the high that he normally would get."     He had COVID last month--is taking longer to recover; heart rate more elevated.    [[facility] visit 2/3/22: Chief Complaint: psychiatry referral and Annual Exam     Patient presents to clinic today for annual physical exam.]  Mild depression      Current Assessment & Plan       Start Zoloft 25mg, ref to psych; encouraged to continue running activities           shows no history of psychotropic controlled substances in [place] for the past two years.     ADHD: denied   Depressive Disorder: depressed mood, angry mood, sleep change, tired/fatigued, worthlessness, concentration problems, hopelessness, social isolation/withdrawal, trouble falling asleep; sleeps about 4 hours and then wakes up and sometimes can go back to sleep; used to have tearfulness but that is better; denied suicidal thoughts currently or in past   Anxiety Disorder: anxiety/nervousness, he reported feeling nauseated "a good bit;" he reported that he feels anxious about bills and working, some " "intrusive thoughts but vague--not often--more related to wanting to sleep to escape; denied OCD symptoms   Panic Disorder: denied   Manic Disorder: he reported that he has highs--can wake up in the morning, go work out, go to work, get home and clean, etc.; if not, then drags through the day; he described his highs as elevated energy for himself; is in a good mood, "nothing can get me down;"  no high risk behaviors; sleeps less when elevated mood; mood cycles about every other month; last time was just prior to starting Zoloft; denied over-spending; denied hypersexuality   Psychotic Disorder: if sleep paralysis at night, sees figure or hears dog barking; happens few times a month and other times goes a few months without it happening; only occurs when going to sleep or waking up   Substance Use:  Alcohol: stopped drinking about October of last year; he said that he stopped going out--he previously only drank when he went out; he used to drink to get drunk sometimes, then just socially; denied other drug use   Physical or Sexual Abuse: denied     GAD7 3/1/2022   1. Feeling nervous, anxious, or on edge? 1   2. Not being able to stop or control worrying? 1   3. Worrying too much about different things? 1   4. Trouble relaxing? 1   5. Being so restless that it is hard to sit still? 0   6. Becoming easily annoyed or irritable? 0   7. Feeling afraid as if something awful might happen? 2   KEN-7 Score 6     (Administered during visit)  Depression Patient Health Questionnaire 3/1/2022   Over the last two weeks how often have you been bothered by little interest or pleasure in doing things 1   Over the last two weeks how often have you been bothered by feeling down, depressed or hopeless 1   PHQ-2 Total Score 2   Over the last two weeks how often have you been bothered by trouble falling or staying asleep, or sleeping too much 2   Over the last two weeks how often have you been bothered by feeling tired or having little " energy 2   Over the last two weeks how often have you been bothered by a poor appetite or overeating 1   Over the last two weeks how often have you been bothered by feeling bad about yourself - or that you are a failure or have let yourself or your family down 1   Over the last two weeks how often have you been bothered by trouble concentrating on things, such as reading the newspaper or watching television 2   Over the last two weeks how often have you been bothered by moving or speaking so slowly that other people could have noticed. Or the opposite - being so fidgety or restless that you have been moving around a lot more than usual. 0   Over the last two weeks how often have you been bothered by thoughts that you would be better off dead, or of hurting yourself 0   If you checked off any problems, how difficult have these problems made it for you to do your work, take care of things at home or get along with other people? Somewhat difficult   Total Score 10   Interpretation Moderate       Review Of Systems: Review of Systems   Constitutional: Positive for fatigue. Negative for activity change and appetite change.   HENT: Negative for nasal congestion, hearing loss, mouth sores, sneezing, sore throat, tinnitus and trouble swallowing.    Eyes: Negative for redness and visual disturbance.   Respiratory: Negative for cough, choking, chest tightness and shortness of breath.    Cardiovascular: Positive for chest pain (every other day--usually after physical exertion). Negative for palpitations and leg swelling.   Gastrointestinal: Positive for nausea (past few days) and reflux. Negative for abdominal pain, constipation, diarrhea and vomiting.   Endocrine: Negative for cold intolerance, heat intolerance, polydipsia and polyphagia.   Genitourinary: Negative for decreased urine volume, difficulty urinating and hematuria.   Musculoskeletal: Negative for back pain, gait problem, joint swelling and myalgias.        Hip pain  "  Integumentary:  Negative for color change and rash.   Allergic/Immunologic: Negative for food allergies.   Neurological: Positive for dizziness (especially when stands up; also gets blurred vision) and headaches. Negative for seizures, speech difficulty and light-headedness. Vertigo: not recent.   Hematological: Negative for adenopathy.   Psychiatric/Behavioral: Positive for decreased concentration, dysphoric mood and sleep disturbance. Negative for agitation, confusion, hallucinations, self-injury and suicidal ideas. The patient is nervous/anxious.         Nutritional Screening: Considering the patient's height and weight, medications, medical history and preferences, should a referral be made to the dietitian? no    Constitutional    Vitals:  Most recent vital signs were reviewed.   Last 3 sets of Vitals    Vitals - 1 value per visit 2/3/2022 2/25/2022 2/25/2022   SYSTOLIC 146 - 138   DIASTOLIC 70 - 72   Pulse 73 - 70   Temp 97.5 - 96.8   SPO2 100 - 98   Weight (lb) 190.7 - 189.15   Weight (kg) 86.5 - 85.8   Height 72 - 72   BMI (Calculated) 25.9 - 25.6   VISIT REPORT - - -   Pain Score  - 0 -            General: age appropriate, bearded, casually dressed, neatly groomed, wearing mask  Musculoskeletal  Muscle Strength/Tone: no tremor, no tic  Gait & Station: non-ataxic  Psychiatric:  Oriented: x 3 / including: Date: March 1st; and aware meeting with [facility] [place],   Attitude: cooperative   Eye Contact: good   Behavior: wnl   Mood: "pretty normal, I guess--tired"  Affect: appropriate range   Attention: spelled "WORLD" forward and backward and completed serial 7s 100-7=--93--84--87/86----------79--72----65; world; -dl--row  Concentration: grossly intact   Thought Process: goal directed   Speech: intelligible; soft voice  Volume: WNL   Quantity: WNL   Rhythm: WNL  Insight: fair to good   Threats: no SI / HI   Memory: Intact and Registers and recalls 3/3 objects immediately and 3/3 at 3 minutes (apple, " "rajwinder, prem) (out of order)  Psychosis: hypnogogic/pompic hallucinations (shadows, hearing dogs bark)  Estimate of Intellectual Function: average to above   Judgment (to simple situation): envelope="put it in a mailbox"   Relevant Elements of Neurological Exam: normal gait     Medical history:   Past Medical History:   Diagnosis Date    Anemia     Herpes simplex virus type 1 (HSV-1) dermatitis         Family History:  Family History   Problem Relation Age of Onset    Anemia Mother     Asthma Mother     Diabetes Father     Hypertension Father     Anemia Sister     No Known Problems Brother     No Known Problems Sister         Family history of psychiatric illness: Maternal aunt had bipolar.    PSYCHO-SOCIAL DEVELOPMENT HISTORY:   Social History     Socioeconomic History    Marital status: Single   Occupational History    Occupation: lab tech   Tobacco Use    Smoking status: Never Smoker    Smokeless tobacco: Never Used   Substance and Sexual Activity    Alcohol use: Not Currently    Drug use: No    Sexual activity: Yes     Partners: Female     Birth control/protection: Condom        Social history: Parents  when Danisha was around 4th grade. He has always lived with his mom--his dad went to longterm. He was in middle school when his dad was released, and they communicated some after that. He severed that relationship about 4 years ago because his dad would say one thing and do another. Danisha has an older brother (6 years older) and two sisters (one is 5 years older; other is 1 year younger). They are close--he lives with his older sister.    Danisha has not been . He is currently in a relationship--together for about 3 years. He does not have children.     He talks to his mom a good bit; he has friends but does not regularly talk to them.    He is a runner--started about 2-3 years ago. He had been running a lot--after his injury (over-use), he has slowed down quite a bit.    Prior " "to COVID, he was in [place] and worked as a . 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 Prior to that, he worked in a lab as a lab tech ([employer]). He is currently substituting teaching. He is looking for another job--likes nature.    Education: He attended [employer] from 8485-5188--he got a bachelors degree in Animal Science. He had considered going to Vet school.     Mormonism / Spiritual: None    Legal: Denied    halfway time: Denied    Disability:  Denied    Allergy Review:   Review of patient's allergies indicates:  No Known Allergies     Medical Problem List:   Patient Active Problem List   Diagnosis    Herpes simplex virus type 1 (HSV-1) dermatitis    Mild depression        Encounter Diagnosis   Name Primary?    Current moderate episode of major depressive disorder without prior episode         IMPRESSIONS/PLAN    Danisha attended his visit. He has no history of psychiatric treatment. He had been physically active and then was injured (over-use?) and was unable to run. This seemed to trigger a depressive episode, especially when combined with changes with work and COVID. There is the possibility of an underlying mood component (aunt had bipolar), though his "highs" do not seem to have risen to full marilyn. He has not noticed changes with this since starting Zoloft, though we did discuss this as a possibility today. He will continue to monitor. He has upcoming PCP and cardiology visits--has had physical symptoms that require further evaluation. He is an excellent candidate for therapy and is willing to engage. At this time, we are continuing his Zoloft at 25 mg.    · Medication Management: Discussed risks, benefits, and alternatives to treatment plan documented above with patient. I answered all patient questions related to this plan, and patient expressed understanding and agreement.   continue Zoloft 25 mg  · therapy referral in clinic   · Sleep hygiene    Follow up in about 4 weeks (around " 3/29/2022) for medication management.     Medication List with Changes/Refills   Current Medications    SERTRALINE (ZOLOFT) 25 MG TABLET    Take 1 tablet (25 mg total) by mouth once daily.        Time spent with pt including note preparation: 62 minutes     Shiloh Mo, PhD, MP  Advanced Practice Medical Psychologist  [facility]  [address] [facility].  [place] [address]  [phone]   [phone] fax

## 2022-03-14 ENCOUNTER — OFFICE VISIT (OUTPATIENT)
Dept: INTERNAL MEDICINE | Facility: CLINIC | Age: 32
End: 2022-03-14
Payer: MEDICAID

## 2022-03-14 VITALS
OXYGEN SATURATION: 99 % | HEIGHT: 72 IN | SYSTOLIC BLOOD PRESSURE: 134 MMHG | DIASTOLIC BLOOD PRESSURE: 84 MMHG | WEIGHT: 184.31 LBS | TEMPERATURE: 98 F | HEART RATE: 71 BPM | BODY MASS INDEX: 24.96 KG/M2

## 2022-03-14 DIAGNOSIS — R53.83 FATIGUE, UNSPECIFIED TYPE: Primary | ICD-10-CM

## 2022-03-14 PROCEDURE — 99213 OFFICE O/P EST LOW 20 MIN: CPT | Mod: S$PBB,,, | Performed by: FAMILY MEDICINE

## 2022-03-14 PROCEDURE — 3079F DIAST BP 80-89 MM HG: CPT | Mod: CPTII,,, | Performed by: FAMILY MEDICINE

## 2022-03-14 PROCEDURE — 3075F SYST BP GE 130 - 139MM HG: CPT | Mod: CPTII,,, | Performed by: FAMILY MEDICINE

## 2022-03-14 PROCEDURE — 99213 OFFICE O/P EST LOW 20 MIN: CPT | Mod: PBBFAC | Performed by: FAMILY MEDICINE

## 2022-03-14 PROCEDURE — 99999 PR PBB SHADOW E&M-EST. PATIENT-LVL III: ICD-10-PCS | Mod: PBBFAC,,, | Performed by: FAMILY MEDICINE

## 2022-03-14 PROCEDURE — 3008F BODY MASS INDEX DOCD: CPT | Mod: CPTII,,, | Performed by: FAMILY MEDICINE

## 2022-03-14 PROCEDURE — 1159F PR MEDICATION LIST DOCUMENTED IN MEDICAL RECORD: ICD-10-PCS | Mod: CPTII,,, | Performed by: FAMILY MEDICINE

## 2022-03-14 PROCEDURE — 3079F PR MOST RECENT DIASTOLIC BLOOD PRESSURE 80-89 MM HG: ICD-10-PCS | Mod: CPTII,,, | Performed by: FAMILY MEDICINE

## 2022-03-14 PROCEDURE — 99213 PR OFFICE/OUTPT VISIT, EST, LEVL III, 20-29 MIN: ICD-10-PCS | Mod: S$PBB,,, | Performed by: FAMILY MEDICINE

## 2022-03-14 PROCEDURE — 3075F PR MOST RECENT SYSTOLIC BLOOD PRESS GE 130-139MM HG: ICD-10-PCS | Mod: CPTII,,, | Performed by: FAMILY MEDICINE

## 2022-03-14 PROCEDURE — 3008F PR BODY MASS INDEX (BMI) DOCUMENTED: ICD-10-PCS | Mod: CPTII,,, | Performed by: FAMILY MEDICINE

## 2022-03-14 PROCEDURE — 99999 PR PBB SHADOW E&M-EST. PATIENT-LVL III: CPT | Mod: PBBFAC,,, | Performed by: FAMILY MEDICINE

## 2022-03-14 PROCEDURE — 1159F MED LIST DOCD IN RCRD: CPT | Mod: CPTII,,, | Performed by: FAMILY MEDICINE

## 2022-03-14 NOTE — PROGRESS NOTES
Subjective:       Patient ID: Danisha Jones is a 31 y.o. male.    Chief Complaint: Fatigue    Reports tiredness mid day x 2 years. No other associated symptoms. Worse with sitting, better with movement.    Review of Systems   Constitutional: Positive for activity change. Negative for unexpected weight change.   HENT: Negative for hearing loss, rhinorrhea and trouble swallowing.    Eyes: Negative for discharge and visual disturbance.   Respiratory: Positive for chest tightness. Negative for wheezing.    Cardiovascular: Positive for chest pain and palpitations.   Gastrointestinal: Negative for blood in stool, constipation, diarrhea and vomiting.   Endocrine: Negative for polydipsia and polyuria.   Genitourinary: Negative for difficulty urinating, hematuria and urgency.   Musculoskeletal: Negative for arthralgias, joint swelling and neck pain.   Neurological: Negative for weakness and headaches.   Psychiatric/Behavioral: Negative for confusion and dysphoric mood.         Objective:      Physical Exam  Vitals reviewed.   Constitutional:       General: He is not in acute distress.     Appearance: He is well-developed.   HENT:      Head: Normocephalic and atraumatic.   Eyes:      General: Lids are normal. No scleral icterus.     Extraocular Movements: Extraocular movements intact.      Conjunctiva/sclera: Conjunctivae normal.      Pupils: Pupils are equal, round, and reactive to light.   Pulmonary:      Effort: Pulmonary effort is normal.   Neurological:      Mental Status: He is alert and oriented to person, place, and time.      Cranial Nerves: No cranial nerve deficit.      Gait: Gait normal.   Psychiatric:         Mood and Affect: Mood and affect normal.         Assessment:       1. Fatigue, unspecified type        Plan:     Problem List Items Addressed This Visit    None     Visit Diagnoses     Fatigue, unspecified type    -  Primary        Recent labs acceptable, mild anemia which has improved.  Depression may be  a factor, keep follow up with Brennen [facility]pe.  Keep upcoming appointment with Dr. Bermudez to rule out cardiac cause.  Advised adequate hydration as mild dehydration can cause fatigue.  Advised adequate sleep and healthy diet to improve energy levels.  Follow up if worse/persistent.

## 2022-03-30 ENCOUNTER — LAB VISIT (OUTPATIENT)
Dept: LAB | Facility: HOSPITAL | Age: 32
End: 2022-03-30
Attending: INTERNAL MEDICINE
Payer: MEDICAID

## 2022-03-30 ENCOUNTER — OFFICE VISIT (OUTPATIENT)
Dept: PSYCHIATRY | Facility: CLINIC | Age: 32
End: 2022-03-30
Payer: MEDICAID

## 2022-03-30 ENCOUNTER — OFFICE VISIT (OUTPATIENT)
Dept: CARDIOLOGY | Facility: CLINIC | Age: 32
End: 2022-03-30
Payer: MEDICAID

## 2022-03-30 VITALS
HEART RATE: 57 BPM | WEIGHT: 189.63 LBS | DIASTOLIC BLOOD PRESSURE: 90 MMHG | BODY MASS INDEX: 25.71 KG/M2 | SYSTOLIC BLOOD PRESSURE: 145 MMHG

## 2022-03-30 VITALS
OXYGEN SATURATION: 98 % | WEIGHT: 189.63 LBS | DIASTOLIC BLOOD PRESSURE: 82 MMHG | SYSTOLIC BLOOD PRESSURE: 138 MMHG | HEART RATE: 56 BPM | BODY MASS INDEX: 25.71 KG/M2

## 2022-03-30 DIAGNOSIS — F32.1 CURRENT MODERATE EPISODE OF MAJOR DEPRESSIVE DISORDER WITHOUT PRIOR EPISODE: Primary | ICD-10-CM

## 2022-03-30 DIAGNOSIS — R07.9 CHEST PAIN, UNSPECIFIED TYPE: Primary | ICD-10-CM

## 2022-03-30 DIAGNOSIS — D64.9 ANEMIA, UNSPECIFIED TYPE: ICD-10-CM

## 2022-03-30 DIAGNOSIS — F32.A MILD DEPRESSION: ICD-10-CM

## 2022-03-30 DIAGNOSIS — Z86.16 HISTORY OF COVID-19: ICD-10-CM

## 2022-03-30 DIAGNOSIS — R06.09 EXERTIONAL DYSPNEA: ICD-10-CM

## 2022-03-30 DIAGNOSIS — R00.2 PALPITATIONS: ICD-10-CM

## 2022-03-30 DIAGNOSIS — R00.9 HEART RATE PROBLEM: ICD-10-CM

## 2022-03-30 LAB
FERRITIN SERPL-MCNC: 91 NG/ML (ref 20–300)
IRON SERPL-MCNC: 98 UG/DL (ref 45–160)
SATURATED IRON: 23 % (ref 20–50)
TOTAL IRON BINDING CAPACITY: 425 UG/DL (ref 250–450)
TRANSFERRIN SERPL-MCNC: 287 MG/DL (ref 200–375)

## 2022-03-30 PROCEDURE — 99214 OFFICE O/P EST MOD 30 MIN: CPT | Mod: HP,HB,S$PBB, | Performed by: PSYCHOLOGIST

## 2022-03-30 PROCEDURE — 1160F PR REVIEW ALL MEDS BY PRESCRIBER/CLIN PHARMACIST DOCUMENTED: ICD-10-PCS | Mod: CPTII,,, | Performed by: INTERNAL MEDICINE

## 2022-03-30 PROCEDURE — 84466 ASSAY OF TRANSFERRIN: CPT | Performed by: INTERNAL MEDICINE

## 2022-03-30 PROCEDURE — 3008F PR BODY MASS INDEX (BMI) DOCUMENTED: ICD-10-PCS | Mod: HP,HB,CPTII, | Performed by: PSYCHOLOGIST

## 2022-03-30 PROCEDURE — 96127 BRIEF EMOTIONAL/BEHAV ASSMT: CPT | Mod: PBBFAC | Performed by: PSYCHOLOGIST

## 2022-03-30 PROCEDURE — 99999 PR PBB SHADOW E&M-EST. PATIENT-LVL II: ICD-10-PCS | Mod: PBBFAC,HB,, | Performed by: PSYCHOLOGIST

## 2022-03-30 PROCEDURE — 3008F PR BODY MASS INDEX (BMI) DOCUMENTED: ICD-10-PCS | Mod: CPTII,,, | Performed by: INTERNAL MEDICINE

## 2022-03-30 PROCEDURE — 3008F BODY MASS INDEX DOCD: CPT | Mod: CPTII,,, | Performed by: INTERNAL MEDICINE

## 2022-03-30 PROCEDURE — 99999 PR PBB SHADOW E&M-EST. PATIENT-LVL III: ICD-10-PCS | Mod: PBBFAC,,, | Performed by: INTERNAL MEDICINE

## 2022-03-30 PROCEDURE — 3077F SYST BP >= 140 MM HG: CPT | Mod: HP,HB,CPTII, | Performed by: PSYCHOLOGIST

## 2022-03-30 PROCEDURE — 3075F PR MOST RECENT SYSTOLIC BLOOD PRESS GE 130-139MM HG: ICD-10-PCS | Mod: CPTII,,, | Performed by: INTERNAL MEDICINE

## 2022-03-30 PROCEDURE — 99214 OFFICE O/P EST MOD 30 MIN: CPT | Mod: S$PBB,,, | Performed by: INTERNAL MEDICINE

## 2022-03-30 PROCEDURE — 3079F DIAST BP 80-89 MM HG: CPT | Mod: CPTII,,, | Performed by: INTERNAL MEDICINE

## 2022-03-30 PROCEDURE — 36415 COLL VENOUS BLD VENIPUNCTURE: CPT | Performed by: INTERNAL MEDICINE

## 2022-03-30 PROCEDURE — 3080F PR MOST RECENT DIASTOLIC BLOOD PRESSURE >= 90 MM HG: ICD-10-PCS | Mod: HP,HB,CPTII, | Performed by: PSYCHOLOGIST

## 2022-03-30 PROCEDURE — 3008F BODY MASS INDEX DOCD: CPT | Mod: HP,HB,CPTII, | Performed by: PSYCHOLOGIST

## 2022-03-30 PROCEDURE — 99999 PR PBB SHADOW E&M-EST. PATIENT-LVL II: CPT | Mod: PBBFAC,HB,, | Performed by: PSYCHOLOGIST

## 2022-03-30 PROCEDURE — 99212 OFFICE O/P EST SF 10 MIN: CPT | Mod: PBBFAC,27 | Performed by: PSYCHOLOGIST

## 2022-03-30 PROCEDURE — 99214 PR OFFICE/OUTPT VISIT, EST, LEVL IV, 30-39 MIN: ICD-10-PCS | Mod: S$PBB,,, | Performed by: INTERNAL MEDICINE

## 2022-03-30 PROCEDURE — 1159F MED LIST DOCD IN RCRD: CPT | Mod: CPTII,,, | Performed by: INTERNAL MEDICINE

## 2022-03-30 PROCEDURE — 99999 PR PBB SHADOW E&M-EST. PATIENT-LVL III: CPT | Mod: PBBFAC,,, | Performed by: INTERNAL MEDICINE

## 2022-03-30 PROCEDURE — 1159F MED LIST DOCD IN RCRD: CPT | Mod: HP,HB,CPTII, | Performed by: PSYCHOLOGIST

## 2022-03-30 PROCEDURE — 3079F PR MOST RECENT DIASTOLIC BLOOD PRESSURE 80-89 MM HG: ICD-10-PCS | Mod: CPTII,,, | Performed by: INTERNAL MEDICINE

## 2022-03-30 PROCEDURE — 1159F PR MEDICATION LIST DOCUMENTED IN MEDICAL RECORD: ICD-10-PCS | Mod: HP,HB,CPTII, | Performed by: PSYCHOLOGIST

## 2022-03-30 PROCEDURE — 3080F DIAST BP >= 90 MM HG: CPT | Mod: HP,HB,CPTII, | Performed by: PSYCHOLOGIST

## 2022-03-30 PROCEDURE — 3077F PR MOST RECENT SYSTOLIC BLOOD PRESSURE >= 140 MM HG: ICD-10-PCS | Mod: HP,HB,CPTII, | Performed by: PSYCHOLOGIST

## 2022-03-30 PROCEDURE — 99213 OFFICE O/P EST LOW 20 MIN: CPT | Mod: PBBFAC | Performed by: INTERNAL MEDICINE

## 2022-03-30 PROCEDURE — 82728 ASSAY OF FERRITIN: CPT | Performed by: INTERNAL MEDICINE

## 2022-03-30 PROCEDURE — 1159F PR MEDICATION LIST DOCUMENTED IN MEDICAL RECORD: ICD-10-PCS | Mod: CPTII,,, | Performed by: INTERNAL MEDICINE

## 2022-03-30 PROCEDURE — 1160F RVW MEDS BY RX/DR IN RCRD: CPT | Mod: CPTII,,, | Performed by: INTERNAL MEDICINE

## 2022-03-30 PROCEDURE — 99214 PR OFFICE/OUTPT VISIT, EST, LEVL IV, 30-39 MIN: ICD-10-PCS | Mod: HP,HB,S$PBB, | Performed by: PSYCHOLOGIST

## 2022-03-30 PROCEDURE — 3075F SYST BP GE 130 - 139MM HG: CPT | Mod: CPTII,,, | Performed by: INTERNAL MEDICINE

## 2022-03-30 RX ORDER — SERTRALINE HYDROCHLORIDE 50 MG/1
50 TABLET, FILM COATED ORAL DAILY
Qty: 30 TABLET | Refills: 1 | Status: SHIPPED | OUTPATIENT
Start: 2022-03-30 | End: 2022-05-03 | Stop reason: SDUPTHER

## 2022-03-30 NOTE — PROGRESS NOTES
"Outpatient Psychiatry Follow-Up Visit     3/30/2022      Clinical Status of Patient:  Outpatient (Ambulatory)    Chief Complaint:  Danisha Jones is a 31 y.o. male who presents today for follow-up of depression.      Impressions/Plan from last visit: Danisha attended his visit. He has no history of psychiatric treatment. He had been physically active and then was injured (over-use?) and was unable to run. This seemed to trigger a depressive episode, especially when combined with changes with work and COVID. There is the possibility of an underlying mood component (aunt had bipolar), though his "highs" do not seem to have risen to full mrailyn. He has not noticed changes with this since starting Zoloft, though we did discuss this as a possibility today. He will continue to monitor. He has upcoming PCP and cardiology visits--has had physical symptoms that require further evaluation. He is an excellent candidate for therapy and is willing to engage. At this time, we are continuing his Zoloft at 25 mg.     · Medication Management: Discussed risks, benefits, and alternatives to treatment plan documented above with patient. I answered all patient questions related to this plan, and patient expressed understanding and agreement.   continue Zoloft 25 mg  · therapy referral in clinic   · Sleep hygiene     Follow up in about 4 weeks (around 3/29/2022) for medication management.     Interval History and Content of Current Session: Danisha attended his visit. He had seen his cardiology earlier today about his heartrate spiking after COVID (when exercising). He will be getting a stress test and will wear a heart monitor for 48 hours. We walked through his schedule--gets up around 4:30 or 5 and exercises; works for 7 ()--throughout day; gets home around 4:30 or 5; if he hasn't meal prepped, he cooks or gets a quick meal; stretches before bed; then showers and goes to bed around 9; if time, may play a game or " "watch tv. On weekends, he sleeps in until 8 or 9; exercises or plays games or walks dog, etc. He has not been getting together with friends much any more--COVID and being in [place] with friends in . He is usually busy during the week--works 5 days. Long-term, he wants to work out of state working in nature as a . He has an interview tomorrow for a job for a summer camp (in [place]). He has a new patient appt next week with MsBrennen Juan Carlos with [facility] for therapy. Depression is a little better but still not where we want it. He reported that two weeks ago, he and his girlfriend broke up. He said that their relationship was "toxic." She had reportedly said things to him that he could not "forgive." They had been together for 2.5 years--she reportedly has bipolar and was having mood swings. He is focused on himself and his own mental health right now. He has not had as much interest in watching sports or playing video games. We also discussed him getting into social activities with athletics--soccer club, running, rock climbing, etc. We agreed to increase his Zoloft to 50 mg and continue to monitor.      GAD7 3/30/2022 3/1/2022   1. Feeling nervous, anxious, or on edge? 1 1   2. Not being able to stop or control worrying? 1 1   3. Worrying too much about different things? 1 1   4. Trouble relaxing? 1 1   5. Being so restless that it is hard to sit still? 0 0   6. Becoming easily annoyed or irritable? 1 0   7. Feeling afraid as if something awful might happen? 1 2   KEN-7 Score 6 6      0-4 = Minimal anxiety  5-9 = Mild anxiety  10-14 = Moderate anxiety  15-21 = Severe anxiety     (Administered during visit)      Depression Patient Health Questionnaire 3/30/2022 3/1/2022   Over the last two weeks how often have you been bothered by little interest or pleasure in doing things 2 1   Over the last two weeks how often have you been bothered by feeling down, depressed or hopeless 1 1   PHQ-2 Total Score 3 2   Over the " last two weeks how often have you been bothered by trouble falling or staying asleep, or sleeping too much 1 2   Over the last two weeks how often have you been bothered by feeling tired or having little energy 2 2   Over the last two weeks how often have you been bothered by a poor appetite or overeating 1 1   Over the last two weeks how often have you been bothered by feeling bad about yourself - or that you are a failure or have let yourself or your family down 1 1   Over the last two weeks how often have you been bothered by trouble concentrating on things, such as reading the newspaper or watching television 1 2   Over the last two weeks how often have you been bothered by moving or speaking so slowly that other people could have noticed. Or the opposite - being so fidgety or restless that you have been moving around a lot more than usual. 0 0   Over the last two weeks how often have you been bothered by thoughts that you would be better off dead, or of hurting yourself 0 0   If you checked off any problems, how difficult have these problems made it for you to do your work, take care of things at home or get along with other people? Somewhat difficult Somewhat difficult   Total Score 9 10   Interpretation Mild Moderate     0-4 = No intervention  5 to 9 = Mild  10 to 14 = Moderate  15 to 19 = Moderately severe  =20 = Severe    Review of Systems   · PSYCHIATRIC: Pertinant items are noted in the narrative.    Past Medical, Family and Social History: The patient's past medical, family and social history have been reviewed and updated as appropriate within the electronic medical record - see encounter notes.      Current Outpatient Medications:     sertraline (ZOLOFT) 25 MG tablet, Take 1 tablet (25 mg total) by mouth once daily., Disp: 30 tablet, Rfl: 5    Compliance: yes    Side effects: None    Risk Parameters:  Patient reports no suicidal ideation  Patient reports no homicidal ideation  Patient reports no  "self-injurious behavior  Patient reports no violent behavior    Exam (detailed: at least 9 elements; comprehensive: all 15 elements)   Constitutional  Vitals:  Most recent vital signs were reviewed.   Last 3 sets of Vitals    Vitals - 1 value per visit 3/14/2022 3/30/2022 3/30/2022   SYSTOLIC 134 138 145   DIASTOLIC 84 82 90   Pulse 71 56 57   Temp 98.1 - -   SPO2 99 98 -   Weight (lb) 184.3 189.6 189.6   Weight (kg) 83.6 86 86   Height 72 - -   BMI (Calculated) 25 - -   VISIT REPORT - - -   Pain Score  - - -          General:  age appropriate, bearded, casually dressed, neatly groomed, wearing glasses, wearing mask     Musculoskeletal  Muscle Strength/Tone:  no tremor, no tic   Gait & Station:  non-ataxic     Psychiatric  Speech:  no latency; no press   Behavior: wnl   Mood & Affect:  "I guess chill"  congruent and appropriate   Thought Process:  normal and logical   Associations:  intact   Thought Content:  normal, no suicidality, no homicidality, delusions, or paranoia   Insight:  intact   Judgement: behavior is adequate to circumstances   Orientation:  grossly intact   Memory: intact for content of interview   Language: grossly intact   Attention Span & Concentration:  Grossly intact   Fund of Knowledge:  intact and appropriate to age and level of education     Assessment and Diagnosis   Status/Progress: Based on the examination today, the patient's problem(s) is/are inadequately controlled.  New problems have not been presented today.   Co-morbidities are complicating management of the primary condition.  There are no active rule-out diagnoses for this patient at this time.     General Impression:     Encounter Diagnosis   Name Primary?    Current moderate episode of major depressive disorder without prior episode Yes         Intervention/Counseling/Treatment Plan   · Medication Management: Discussed risks, benefits, and alternatives to treatment plan documented above with patient. I answered all patient " questions related to this plan, and patient expressed understanding and agreement.   increase Zoloft 50 mg  · keep therapy appt   · Join social athletic group  · Check into [employer] and/or [employer] for youth    Return to Clinic: 6 weeks    Medication List with Changes/Refills   Current Medications    SERTRALINE (ZOLOFT) 25 MG TABLET    Take 1 tablet (25 mg total) by mouth once daily.        Time spent with pt including note preparation: 35 minutes     Shiloh Mo, PhD, MP  Advanced Practice Medical Psychologist  [facility]  [place] [address]  [phone]   [phone] fax

## 2022-03-30 NOTE — PROGRESS NOTES
"Subjective:   Patient ID:  Danisha Jones is a 31 y.o. male who presents for cardiac consult of No chief complaint on file.      HPI  The patient came in today for cardiac consult of No chief complaint on file.    Referring Physician: Stephanie Scott MD  Reason for consult: CP      Danisha Jones is a 31 y.o. male with h/o COVID 19,  h/o elevated BP, chest pain presents for follow up CV evaluation.     3/20/19  He presented to Dr. Scott's office two days ago and complained of chest pain. He complains of exertional CP, started last year occurs 5 x week.   About the same as in the past feels like a pinching type. Occurs when he does free weights, does not feel with treadmill. Positional movements increase pain.     6/21/19  2D ECHO overall normal Bi V function and valves. Bp improved today. CP has improved with NSAIDS, still sore at times after working out. Occ feels dizzy upon standing.   Still does weights/cardio.     3/30/22  Follow up since 6/2019.     From PCP office " (Concerns about HR when doing strenuous exercise). Pt reports since he had Covid infection mid January 2022, he has noticed higher elevations of his heart rate with exertional dyspnea. Pt runs regularly and notices changes in his HR. Prior to Covid his heart rate for an "easy run" would maintain at 160bpm and he was able to complete a 3 mile run in about 30 minutes. Post-Covid his HR goes in the 170-180s range causing him to slow down during easy runs and takes him about 7-8 minutes longer to complete his 3 mile run"    Today Bp 130s/80s. HR 50s. He has started Zoloft in Feb for depression/anxiety. He has more fatigue now since having COVID. He runs marathons, did one last year is trying to train for marathon again.     Patient feels no sob, no leg swelling, no PND, no dizziness, no syncope, no CNS symptoms.    Patient has fairly good exercise tolerance. 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    FH - no CV " disease    ECG - NSR    2D ECHO  CONCLUSIONS     1 - Concentric hypertrophy.     2 - No wall motion abnormalities.     3 - Normal left ventricular systolic function (EF 55-60%).     4 - Normal left ventricular diastolic function.     5 - Normal right ventricular systolic function .     6 - The estimated PA systolic pressure is 33 mmHg.     7 - Trivial tricuspid regurgitation.     8 - Intermediate central venous pressure.       This document has been electronically    SIGNED BY: Pearl Magdaleno MD On: 03/30/2019 11:42    Past Medical History:   Diagnosis Date    Anemia     Herpes simplex virus type 1 (HSV-1) dermatitis        History reviewed. No pertinent surgical history.    Social History     Tobacco Use    Smoking status: Never Smoker    Smokeless tobacco: Never Used   Substance Use Topics    Alcohol use: Not Currently    Drug use: No       Family History   Problem Relation Age of Onset    Anemia Mother     Asthma Mother     Diabetes Father     Hypertension Father     Anemia Sister     No Known Problems Brother     No Known Problems Sister        Patient's Medications   New Prescriptions    No medications on file   Previous Medications    SERTRALINE (ZOLOFT) 25 MG TABLET    Take 1 tablet (25 mg total) by mouth once daily.   Modified Medications    No medications on file   Discontinued Medications    No medications on file       Review of Systems   Constitutional: Positive for malaise/fatigue.   HENT: Negative.    Eyes: Negative.    Respiratory: Positive for shortness of breath.    Cardiovascular: Positive for chest pain and palpitations.   Gastrointestinal: Negative.    Genitourinary: Negative.    Musculoskeletal: Negative.    Skin: Negative.    Neurological: Negative.    Endo/Heme/Allergies: Negative.    Psychiatric/Behavioral: Negative.    All 12 systems otherwise negative.      Wt Readings from Last 3 Encounters:   03/14/22 83.6 kg (184 lb 4.9 oz)   02/25/22 85.8 kg (189 lb 2.5 oz)   02/03/22 86.5 kg  (190 lb 11.2 oz)     Temp Readings from Last 3 Encounters:   03/14/22 98.1 °F (36.7 °C) (Tympanic)   02/25/22 96.8 °F (36 °C) (Temporal)   02/03/22 97.5 °F (36.4 °C) (Tympanic)     BP Readings from Last 3 Encounters:   03/14/22 134/84   02/25/22 138/72   02/03/22 (!) 146/70     Pulse Readings from Last 3 Encounters:   03/14/22 71   02/25/22 70   02/03/22 73       There were no vitals taken for this visit.    Objective:   Physical Exam  Vitals and nursing note reviewed.   Constitutional:       General: He is not in acute distress.     Appearance: He is well-developed. He is not diaphoretic.   HENT:      Head: Normocephalic and atraumatic.      Nose: Nose normal.   Eyes:      General: No scleral icterus.     Conjunctiva/sclera: Conjunctivae normal.   Neck:      Thyroid: No thyromegaly.      Vascular: No JVD.   Cardiovascular:      Rate and Rhythm: Normal rate and regular rhythm.      Heart sounds: S1 normal and S2 normal. No murmur heard.    No friction rub. No gallop. No S3 or S4 sounds.   Pulmonary:      Effort: Pulmonary effort is normal. No respiratory distress.      Breath sounds: Normal breath sounds. No stridor. No wheezing or rales.   Chest:      Chest wall: No tenderness.   Abdominal:      General: Bowel sounds are normal. There is no distension.      Palpations: Abdomen is soft. There is no mass.      Tenderness: There is no abdominal tenderness. There is no rebound.   Genitourinary:     Comments: Deferred  Musculoskeletal:         General: No tenderness or deformity. Normal range of motion.      Cervical back: Normal range of motion and neck supple.   Lymphadenopathy:      Cervical: No cervical adenopathy.   Skin:     General: Skin is warm and dry.      Coloration: Skin is not pale.      Findings: No erythema or rash.   Neurological:      Mental Status: He is alert and oriented to person, place, and time.      Motor: No abnormal muscle tone.      Coordination: Coordination normal.   Psychiatric:          Behavior: Behavior normal.         Thought Content: Thought content normal.         Judgment: Judgment normal.         Lab Results   Component Value Date     02/07/2022    K 4.0 02/07/2022     02/07/2022    CO2 26 02/07/2022    BUN 17 02/07/2022    CREATININE 1.0 02/07/2022    GLU 84 02/07/2022    AST 29 02/07/2022    ALT 20 02/07/2022    ALBUMIN 4.5 02/07/2022    PROT 7.2 02/07/2022    BILITOT 0.5 02/07/2022    WBC 6.18 03/01/2022    HGB 13.2 (L) 03/01/2022    HCT 42.2 03/01/2022    MCV 85 03/01/2022     03/01/2022    TSH 0.544 02/07/2022    CHOL 160 02/07/2022    HDL 59 02/07/2022    LDLCALC 94.6 02/07/2022    TRIG 32 02/07/2022     Assessment:      1. Chest pain, unspecified type    2. Heart rate problem    3. Exertional dyspnea    4. History of COVID-19    5. Palpitations        Plan:   1. Chest pain, atypical - ongoing  - discussed may be due to costochondritis - NSAIDS PRN  - if symptoms worse needs more urgent eval    2. Elevated BP - stable now   - monitor at home  - decrease salt intake    3. H/O COVID 19 with palpitations/tachycardia  - order ECHO, stress test, Holter    4. Anemia, unsure etiology  - order iron studies  - f/u PCP or heme onc if low    Thank you for allowing me to participate in this patient's care. Please do not hesitate to contact me with any questions or concerns. Consult note has been forwarded to the referral physician.

## 2022-03-30 NOTE — PATIENT INSTRUCTIONS
"[facility]PART[facility] OF PSYCHIATRY   PATIENT INFORMATION    We appreciate the opportunity to participate in your medical care and hope the following protocols will make it easier for you to receive quality treatment in our department.    PUNCTUALITY: Your appointment is scheduled for a fixed amount of time, reserved especially for you.  To get the benefit of your appointment, please arrive at least 15 minutes early to allow time for traffic, parking and registration.  Should you arrive more than 15 minutes late to your appointment, you will be rescheduled in order to assure your clinician has adequate time to assess you and provide helpful care.      APPOINTMENTS: Appointments are made by the nursing/front office staff or through the patient portal. Providers do not have access  to schedule appointments. Walk in appointments are not available. FOR EMERGENCIES, PLEASE GO THE CLOSEST EMERGENCY ROOM.    CANCELLATION/MISSED APPOINTMENTS:   In order to receive quality care, all appointments must be kept.  If you are unable to keep an appointment, please reschedule at least 3 days prior if possible. Late cancellations (within 24 hours of the appointment) and repeated no-show appointments may result in dismissal from the clinic. After two no show/late cancellation visits, you will receive a notice letter, alerting you to keep visits to prevent department dismissal. If another visit is missed after receipt of the notice, you will be discharged from the clinic. This policy is in effect to allow for other individuals on a long waiting list to be seen as soon as possible. Unlike other branches of medicine where several individuals can be scheduled in a 30 minute time slot, only one individual can be scheduled in any time slot in Psychiatry.     MESSAGES: For simple questions/concerns, you may contact your individual providers electronically through the "[facility]" portal or by calling [phone] " with messages relayed via office staff. If relevant, include pharmacy name and phone number, date of last visit and next scheduled visit, phone number where you can be reached throughout the day, and whether leaving a voicemail or message on an answering machine is acceptable. Messages will be returned by the Medical Assistant or Office Staff after your provider has reviewed the message.  Please allow 24 hours for a returned message before leaving another message. Messages will be checked each workday (Monday through Friday) during office hours (8:00 a.m. and 5:00 p.m.) and returned at most within one business day.  You may leave a non-urgent message after hours. Note that psychotherapy and medication management are not appropriate by telephone or the patient portal.    PRESCRIPTION REFILLS:  Please communicate with your prescriber about any refills you need during your appointment. You may also request refills through the [facility] portal (preferred) or by calling the clinic. Prescriptions will be filled during office hours.     Please do not wait until you are completely out of medication to request refills. Same day refills are not always possible. Patients may experience symptoms of withdrawal if they run out of medications. The patient assumes all responsibility when there is an issue with non-compliance with follow-up appointments and medications.  Some medications are controlled and regulated by the FDA and [place]. Some of these medications can not be refilled before 30 days and require a face to face appointment.     PAPERWORK REQUESTS: If you have any forms or letters that need to be completed by your doctor, please present these at the beginning of the appointment to ensure that information needed to complete them is obtained during the office visit. Paperwork will be returned within 7-10 business days. Staff will call you to  the paperwork when completed.    SPECIAL EVALUATIONS: Please note that our  "department is treatment-focused. As such, we focus on treatment-oriented evaluations and do not perform specialty or "forensic" evaluations. Examples are listed below.    Disability: We do not do disability evaluations.  Please contact Social Security Administration for evaluations and determinations. You will then sign releases allowing for records from your treatment providers to be forwarded to Social Security Administration to use in their evaluation.  Gun Permit: We do not offer Sound Judgment Evaluations or assessments leading to gun ownership, nor do we fill out or file paperwork relevant to owning, concealing or purchasing a firearm.  Emotional Support     Animals (PROSPER): We do not provide documentation, including letters, to aid in the acclamation that an Emotional Support Animal is required. Note that ESAs are not trained to perform tasks or recognize particular signs or symptoms. Rather, they are distinguished by the close, emotional, and supportive bond between the animal and the owner.       SAMPLES: We do not provide samples of any medications. If you have financial difficulties and are on a limited income, you may qualify for Patient Assistance Programs from various pharmaceutical companies. This will require that you complete paperwork with your financial information, but this does not guarantee that the company will approve the application. Alternative medication options can be discussed.    REFERRALS/COORDINATION: You will be referred to other providers if we feel unable to adequately diagnose or treat your particular condition, or if collaboration with another provider would allow for better management of your condition.     This document is for information purposes only. Please refer to the full disclaimer and copyright statement available at http://www.cci.health.wa.gov.au regarding the information from this website before making use of such information.  See website www.cci.health.wa.gov.au " for more handouts and resources.    What is Sleep Hygiene?  Sleep hygiene is the term used to describe good sleep habits. Considerable research has gone into developing a set of guidelines and tips which are designed to enhance good sleeping, and there is much evidence to suggest that these strategies can provide long-term solutions to sleep difficulties. There are many medications which are used to treat insomnia,  but these tend to be only effective in the short-term. Ongoing use of sleeping pills may lead to dependence and interfere with developing good sleep habits independent of medication,  thereby prolonging sleep difficulties. Talk to your health professional about what is right for you, but we recommend good sleep hygiene as an important part of treating insomnia,  either with other strategies such as medication or cognitive therapy or alone.    Sleep Hygiene Tips  1) Get regular. One of the best ways to train your body to sleep well is to go to bed and get up at more or less the same time every day, even on weekends and days off! This regular rhythm will make you feel better and will give your body something to work from.  2) Sleep when sleepy. Only try to sleep when you actually feel tired or sleepy, rather than spending too much time awake in bed.  3) Get up & try again. If you havent been able to get to sleep after about 20 minutes or more, get up and do something calming or boring until you feel sleepy, then return to bed and try again. Sit quietly on the couch with the lights off (bright light will tell your brain that it is time to wake up), or read something boring like the phone book. Avoid doing anything that is too stimulating or interesting, as this will wake you up even more.  4) Avoid caffeine & nicotine. It is best to avoid consuming any caffeine (in coffee, tea, cola drinks, chocolate, and some medications) or nicotine (cigarettes) for at least 4-6 hours before going to bed. These  substances act as stimulants and interfere with the ability to fall asleep   5) Avoid alcohol. It is also best to avoid alcohol for at least 4-6 hours before going to bed. Many people believe that alcohol is relaxing and helps them to get to sleep at first, but it actually interrupts the quality of sleep.  6) Bed is for sleeping. Try not to use your bed for anything other than sleeping and sex, so that your body comes to associate bed with sleep. If you use bed as a place to watch TV, eat, read, work on your laptop, pay bills, and other things, your body will not learn this connection.  7) No naps. It is best to avoid taking naps during the day, to make sure that you are tired at bedtime. If you cant make it through the day without a nap, make sure it is for less than an hour and before 3pm.  8) Sleep rituals. You can develop your own rituals of things to remind your body that it is time to sleep - some people find it useful to do relaxing stretches or breathing exercises for 15 minutes before bed each night, or sit calmly with a cup of caffeine-free tea.  9) Bathtime. Having a hot bath 1-2 hours before bedtime can be useful, as it will raise your body temperature, causing you to feel sleepy as your body temperature drops again. Research shows that sleepiness is associated with a drop in body temperature.  10) No clock-watching. Many people who struggle with sleep tend to watch the clock too much. Frequently checking the clock during the night can wake you up (especially if you turn  on the light to read the time) and reinforces negative thoughts such as Oh no, look how late it is, Ill never get to sleep or its so early, I have only slept for 5 hours, this is  terrible.  11) Use a sleep diary. This worksheet can be a useful way of making sure you have the right facts about your sleep, rather than making assumptions. Because a diary involves watching  the clock (see point 10) it is a good idea to only use it  for two weeks to get an idea of what is going and then perhaps two months down the track to see how you are progressing.  12) Exercise. Regular exercise is a good idea to help with good sleep, but try not to do strenuous exercise in the 4 hours before bedtime. Morning walks are a great way to start the day feeling refreshed!  13) Eat right. A healthy, balanced diet will help you to sleep well, but timing is important. Some people find that a very empty stomach at bedtime is distracting, so it can be useful  to have a light snack, but a heavy meal soon before bed can also interrupt sleep. Some people recommend a warm glass of milk, which contains tryptophan, which acts as a natural  sleep inducer.  14) The right space. It is very important that your bed and bedroom are quiet and comfortable for sleeping. A cooler room with enough blankets to stay warm is best, and make sure you have curtains or an eyemask to block out early morning light and earplugs if there is noise outside your room.  15) Keep daytime routine the same. Even if you have a bad night sleep and are tired it is important that you try to keep your daytime activities the same as you had planned. That is,  dont avoid activities because you feel tired. This can reinforce the insomnia.     Call In if problems  Call Report Side Effects   Encouraged to follow up with primary care / Gen Med MD for continued monitoring of general health and wellness  Call 201 Or go to ER if Acute Concerns (especially if any thoughts of harm to self or other)   Join social athletic group  Check into Outward Bound and/or Wilderness Camps for youth       Shiloh Mo, PhD, MP  Advanced Practice Medical Psychologist  [facility]  [address]  [place] [address]  [phone]   [phone] fax

## 2022-04-04 ENCOUNTER — OFFICE VISIT (OUTPATIENT)
Dept: PSYCHIATRY | Facility: CLINIC | Age: 32
End: 2022-04-04
Payer: MEDICAID

## 2022-04-04 DIAGNOSIS — F32.1 CURRENT MODERATE EPISODE OF MAJOR DEPRESSIVE DISORDER WITHOUT PRIOR EPISODE: Primary | ICD-10-CM

## 2022-04-04 PROCEDURE — 90791 PR PSYCHIATRIC DIAGNOSTIC EVALUATION: ICD-10-PCS | Mod: AJ,HB,, | Performed by: SOCIAL WORKER

## 2022-04-04 PROCEDURE — 99999 PR PBB SHADOW E&M-EST. PATIENT-LVL I: ICD-10-PCS | Mod: PBBFAC,AJ,HB, | Performed by: SOCIAL WORKER

## 2022-04-04 PROCEDURE — 99211 OFF/OP EST MAY X REQ PHY/QHP: CPT | Mod: PBBFAC | Performed by: SOCIAL WORKER

## 2022-04-04 PROCEDURE — 90791 PSYCH DIAGNOSTIC EVALUATION: CPT | Mod: AJ,HB,, | Performed by: SOCIAL WORKER

## 2022-04-04 PROCEDURE — 1159F MED LIST DOCD IN RCRD: CPT | Mod: AJ,HB,CPTII, | Performed by: SOCIAL WORKER

## 2022-04-04 PROCEDURE — 99999 PR PBB SHADOW E&M-EST. PATIENT-LVL I: CPT | Mod: PBBFAC,AJ,HB, | Performed by: SOCIAL WORKER

## 2022-04-04 PROCEDURE — 1159F PR MEDICATION LIST DOCUMENTED IN MEDICAL RECORD: ICD-10-PCS | Mod: AJ,HB,CPTII, | Performed by: SOCIAL WORKER

## 2022-04-04 NOTE — PROGRESS NOTES
"  Outpatient Psychiatry Initial Visit (PhD/LCSW)    Diagnostic Interview - CPT 24432 ** Pt arrived 8 minutes late for eval appt    Date: 4/4/2022    Site: [place]    Primary care provider: Stephanie Scott MD    Danisha Jones, a 31 y.o. male, for initial evaluation visit. Met with patient.      Subjective:     Chief complaint/reason for encounter: depression and mood swings    History of present illness: "been depressed, having mood swings, a few days after being down I'm on a high, nothing can get me down, full of energy." States he sleeps about 6 hours per night. Reports he has been feeling this way for about a year since moving back to [place] after being laid off from his job in [place] due to Covid. 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 Rates his depression 6/10. Denies SI, plan or intent but reports he had SI for "about 5 minutes" after his now ex-girlfriend sent a hurtful, racist text to him. States he still loves her and misses her but her text "crossed a line." He endorses decreased interest in activities, decreased motivation, occasional thoughts of death. Rates his anxiety 5/10 and denies hx of panic attacks. He endorses excessive worry and restlessness.    States he has support if he seeks it but "I don't like bothering people." His ex-girlfriend was jealous and often accused him of cheating on her. He stopped spending time with friends because of this.    "working through it even though I am down, not lying in bed all day, trying to get out of the depression."    Symptoms:   · Depression: depressed mood, diminished interest, fatigue, worthlessness/guilt, thoughts of death, tearfulness and social isolation  · Anxiety: excessive anxiety/worry, restlessness/keyed up and irritability  · Substance abuse: denied  · Cognitive functioning: denied  · Tammie: none noted  · Psychosis: none noted      Psychiatric history: currently under psychiatric " "care    Medical history:   Patient Active Problem List   Diagnosis    Herpes simplex virus type 1 (HSV-1) dermatitis    Depression        Family history of psychiatric illness: maternal aunt--bipolar    Social history (marriage, employment, legal, etc.): Raised in [place] by both bio parents who were not . Father was incarcerated when pt was 6-7 for selling drugs. He was released when pt was 10-11. He visited his father in long term but does not remember it, other than running away because father had lost weight and pt did not believe it was him. Father had regular visitation with pt after his release. Pt has 1 older brother, 1 older sister and 1 younger sister. Mother worked at an airplane plant painting planes. Both parents are still living in [place].    Pt lives with his sister and gets along well with her. He was dating a young woman for 2.5 years but broke up a few weeks ago. "We are both alike and very stubborn, toxic together. She is also bipolar. We say things that hurt each other." States he misses her and still loves her but realizes it is for the best.    He has a BS in animal science from [employer]. States he thought about applying to veterinary school but "I don't like the veterinary field. The people are catty and talk behind each other's backs." He previously worked as a . He would like to work as a  and has applied to jobs out of state due to lack of opportunities in [place]. Currently works as a . He states he is doing okay financially and paying down his credit card debt    Pt runs, lifts weights, does yoga and meditates to cope with stress.    Trauma/abuse history: father's incarceration for several years    Substance use:  Alcohol: none; Drank socially in college; stopped altogether in Oct 2021 due to his fitness goals  Drugs: none  Tobacco: none  Caffeine: none    Current medications and drug reactions (include OTC, herbal):   Outpatient Encounter " Medications as of 4/4/2022   Medication Sig Dispense Refill    sertraline (ZOLOFT) 50 MG tablet Take 1 tablet (50 mg total) by mouth once daily. 30 tablet 1     No facility-administered encounter medications on file as of 4/4/2022.          Objective - Current Evaluation:     Mental Status Evaluation  Appearance: unremarkable, age appropriate  Behavior: cooperative, restless and fidgety  Speech: normal tone, normal rate, normal pitch, normal volume  Mood: depressed  Affect: congruent and appropriate, blunted  Thought Process: normal and logical  Thought Content: normal, no suicidality, no homicidality, delusions, or paranoia  Sensorium: grossly intact  Cognition: grossly intact  Insight: good  Judgment: adequate to circumstances    Strengths and liabilities: Strength: Patient accepts guidance/feedback, Strength: Patient is expressive/articulate, Strength: Patient is intelligent, Strength: Patient is motivated for change, Strength: Patient is physically healthy, Strength: Patient has reasonable judgment, Liability: Patient has limited or no suport network and Liability: Patient lacks coping skills      Diagnostic Impression - Plan:     1. Current moderate episode of major depressive disorder without prior episode        Plan:individual psychotherapy and medication management by physician    Return to Clinic: 2 weeks    Length of Service (minutes): 60         Angie Rangel LCSW  Outpatient Psychiatry

## 2022-04-07 ENCOUNTER — HOSPITAL ENCOUNTER (OUTPATIENT)
Dept: CARDIOLOGY | Facility: HOSPITAL | Age: 32
Discharge: HOME | End: 2022-04-07
Attending: INTERNAL MEDICINE
Payer: MEDICAID

## 2022-04-07 VITALS
HEIGHT: 72 IN | WEIGHT: 189 LBS | SYSTOLIC BLOOD PRESSURE: 154 MMHG | BODY MASS INDEX: 25.6 KG/M2 | SYSTOLIC BLOOD PRESSURE: 154 MMHG | HEIGHT: 72 IN | DIASTOLIC BLOOD PRESSURE: 72 MMHG | WEIGHT: 189 LBS | BODY MASS INDEX: 25.6 KG/M2 | DIASTOLIC BLOOD PRESSURE: 72 MMHG

## 2022-04-07 DIAGNOSIS — R07.9 CHEST PAIN, UNSPECIFIED TYPE: ICD-10-CM

## 2022-04-07 DIAGNOSIS — R00.9 HEART RATE PROBLEM: ICD-10-CM

## 2022-04-07 DIAGNOSIS — Z86.16 HISTORY OF COVID-19: ICD-10-CM

## 2022-04-07 DIAGNOSIS — R06.09 EXERTIONAL DYSPNEA: ICD-10-CM

## 2022-04-07 DIAGNOSIS — R00.2 PALPITATIONS: ICD-10-CM

## 2022-04-07 LAB
AORTIC ROOT ANNULUS: 3.38 CM
AV INDEX (PROSTH): 0.78
AV MEAN GRADIENT: 3 MMHG
AV PEAK GRADIENT: 5 MMHG
AV VALVE AREA: 2.43 CM2
AV VELOCITY RATIO: 0.79
BSA FOR ECHO PROCEDURE: 2.09 M2
CV ECHO LV RWT: 0.63 CM
CV STRESS BASE HR: 72 BPM
DIASTOLIC BLOOD PRESSURE: 72 MMHG
DOP CALC AO PEAK VEL: 1.07 M/S
DOP CALC AO VTI: 24 CM
DOP CALC LVOT AREA: 3.1 CM2
DOP CALC LVOT DIAMETER: 2 CM
DOP CALC LVOT PEAK VEL: 0.85 M/S
DOP CALC LVOT STROKE VOLUME: 58.4 CM3
DOP CALC RVOT PEAK VEL: 0.58 M/S
DOP CALC RVOT VTI: 15 CM
DOP CALCLVOT PEAK VEL VTI: 18.6 CM
E WAVE DECELERATION TIME: 194.28 MSEC
E/A RATIO: 2.02
ECHO EF ESTIMATED: 56 %
ECHO LV POSTERIOR WALL: 1.36 CM (ref 0.6–1.1)
EJECTION FRACTION: 55 %
FRACTIONAL SHORTENING: 29 % (ref 28–44)
INTERVENTRICULAR SEPTUM: 0.96 CM (ref 0.6–1.1)
IVRT: 82.78 MSEC
LA WIDTH: 4.31 CM
LEFT ATRIUM SIZE: 3.43 CM
LEFT ATRIUM VOLUME INDEX MOD: 21.6 ML/M2
LEFT ATRIUM VOLUME MOD: 44.88 CM3
LEFT INTERNAL DIMENSION IN SYSTOLE: 3.06 CM (ref 2.1–4)
LEFT VENTRICLE DIASTOLIC VOLUME INDEX: 40.09 ML/M2
LEFT VENTRICLE DIASTOLIC VOLUME: 83.38 ML
LEFT VENTRICLE MASS INDEX: 85 G/M2
LEFT VENTRICLE SYSTOLIC VOLUME INDEX: 17.6 ML/M2
LEFT VENTRICLE SYSTOLIC VOLUME: 36.61 ML
LEFT VENTRICULAR INTERNAL DIMENSION IN DIASTOLE: 4.31 CM (ref 3.5–6)
LEFT VENTRICULAR MASS: 176.46 G
LVOT MG: 1.73 MMHG
LVOT MV: 0.62 CM/S
MV PEAK A VEL: 0.43 M/S
MV PEAK E VEL: 0.87 M/S
OHS CV CPX 1 MINUTE RECOVERY HEART RATE: 134 BPM
OHS CV CPX 85 PERCENT MAX PREDICTED HEART RATE MALE: 162
OHS CV CPX ESTIMATED METS: 13
OHS CV CPX MAX PREDICTED HEART RATE: 191
OHS CV CPX PATIENT IS FEMALE: 0
OHS CV CPX PATIENT IS MALE: 1
OHS CV CPX PEAK DIASTOLIC BLOOD PRESSURE: 73 MMHG
OHS CV CPX PEAK HEAR RATE: 171 BPM
OHS CV CPX PEAK RATE PRESSURE PRODUCT: NORMAL
OHS CV CPX PEAK SYSTOLIC BLOOD PRESSURE: 215 MMHG
OHS CV CPX PERCENT MAX PREDICTED HEART RATE ACHIEVED: 90
OHS CV CPX RATE PRESSURE PRODUCT PRESENTING: NORMAL
PISA TR MAX VEL: 2.3 M/S
PULM VEIN S/D RATIO: 1.28
PV MEAN GRADIENT: 0.9 MMHG
PV MV: 0.78 M/S
PV PEAK D VEL: 0.61 M/S
PV PEAK S VEL: 0.78 M/S
PV PEAK VELOCITY: 1 CM/S
RA MAJOR: 4.04 CM
RA PRESSURE: 3 MMHG
RIGHT VENTRICULAR END-DIASTOLIC DIMENSION: 2.55 CM
STJ: 2.28 CM
STRESS ECHO POST EXERCISE DUR MIN: 10 MINUTES
STRESS ECHO POST EXERCISE DUR SEC: 8 SECONDS
SYSTOLIC BLOOD PRESSURE: 154 MMHG
TR MAX PG: 21 MMHG
TV REST PULMONARY ARTERY PRESSURE: 24 MMHG

## 2022-04-07 PROCEDURE — 93306 ECHO (CUPID ONLY): ICD-10-PCS | Mod: 26,,, | Performed by: INTERNAL MEDICINE

## 2022-04-07 PROCEDURE — 93017 CV STRESS TEST TRACING ONLY: CPT | Mod: PO

## 2022-04-07 PROCEDURE — 93016 EXERCISE STRESS - EKG (CUPID ONLY): ICD-10-PCS | Mod: ,,, | Performed by: INTERNAL MEDICINE

## 2022-04-07 PROCEDURE — 93018 EXERCISE STRESS - EKG (CUPID ONLY): ICD-10-PCS | Mod: ,,, | Performed by: INTERNAL MEDICINE

## 2022-04-07 PROCEDURE — 93306 TTE W/DOPPLER COMPLETE: CPT | Mod: 26,,, | Performed by: INTERNAL MEDICINE

## 2022-04-07 PROCEDURE — 93016 CV STRESS TEST SUPVJ ONLY: CPT | Mod: ,,, | Performed by: INTERNAL MEDICINE

## 2022-04-07 PROCEDURE — 93018 CV STRESS TEST I&R ONLY: CPT | Mod: ,,, | Performed by: INTERNAL MEDICINE

## 2022-04-07 PROCEDURE — 93306 TTE W/DOPPLER COMPLETE: CPT | Mod: PO

## 2022-04-08 ENCOUNTER — HOSPITAL ENCOUNTER (OUTPATIENT)
Dept: CARDIOLOGY | Facility: HOSPITAL | Age: 32
Discharge: HOME | End: 2022-04-08
Attending: INTERNAL MEDICINE
Payer: MEDICAID

## 2022-04-08 DIAGNOSIS — R07.9 CHEST PAIN, UNSPECIFIED TYPE: ICD-10-CM

## 2022-04-08 DIAGNOSIS — R00.2 PALPITATIONS: ICD-10-CM

## 2022-04-08 DIAGNOSIS — Z86.16 HISTORY OF COVID-19: ICD-10-CM

## 2022-04-08 DIAGNOSIS — R00.9 HEART RATE PROBLEM: ICD-10-CM

## 2022-04-08 DIAGNOSIS — R06.09 EXERTIONAL DYSPNEA: ICD-10-CM

## 2022-04-08 PROCEDURE — 93226 XTRNL ECG REC<48 HR SCAN A/R: CPT | Mod: PO

## 2022-04-08 PROCEDURE — 93227 XTRNL ECG REC<48 HR R&I: CPT | Mod: ,,, | Performed by: INTERNAL MEDICINE

## 2022-04-08 PROCEDURE — 93227 HOLTER MONITOR - 48 HOUR (CUPID ONLY): ICD-10-PCS | Mod: ,,, | Performed by: INTERNAL MEDICINE

## 2022-04-12 LAB
OHS CV EVENT MONITOR DAY: 2
OHS CV HOLTER LENGTH DECIMAL HOURS: 96
OHS CV HOLTER LENGTH HOURS: 48
OHS CV HOLTER LENGTH MINUTES: 0
OHS CV HOLTER SINUS AVERAGE HR: 60
OHS CV HOLTER SINUS MAX HR: 150
OHS CV HOLTER SINUS MIN HR: 39

## 2022-04-15 ENCOUNTER — PATIENT MESSAGE (OUTPATIENT)
Dept: CARDIOLOGY | Facility: CLINIC | Age: 32
End: 2022-04-15
Payer: MEDICAID

## 2022-05-03 ENCOUNTER — OFFICE VISIT (OUTPATIENT)
Dept: PSYCHIATRY | Facility: CLINIC | Age: 32
End: 2022-05-03
Payer: MEDICAID

## 2022-05-03 DIAGNOSIS — F32.A MILD DEPRESSION: ICD-10-CM

## 2022-05-03 DIAGNOSIS — F32.0 CURRENT MILD EPISODE OF MAJOR DEPRESSIVE DISORDER WITHOUT PRIOR EPISODE: Primary | ICD-10-CM

## 2022-05-03 PROCEDURE — 99211 OFF/OP EST MAY X REQ PHY/QHP: CPT | Mod: PBBFAC | Performed by: PSYCHOLOGIST

## 2022-05-03 PROCEDURE — 99999 PR PBB SHADOW E&M-EST. PATIENT-LVL I: ICD-10-PCS | Mod: PBBFAC,HB,, | Performed by: PSYCHOLOGIST

## 2022-05-03 PROCEDURE — 99213 PR OFFICE/OUTPT VISIT, EST, LEVL III, 20-29 MIN: ICD-10-PCS | Mod: HP,HB,S$PBB, | Performed by: PSYCHOLOGIST

## 2022-05-03 PROCEDURE — 99999 PR PBB SHADOW E&M-EST. PATIENT-LVL I: CPT | Mod: PBBFAC,HB,, | Performed by: PSYCHOLOGIST

## 2022-05-03 PROCEDURE — 99213 OFFICE O/P EST LOW 20 MIN: CPT | Mod: HP,HB,S$PBB, | Performed by: PSYCHOLOGIST

## 2022-05-03 RX ORDER — SERTRALINE HYDROCHLORIDE 50 MG/1
50 TABLET, FILM COATED ORAL DAILY
Qty: 30 TABLET | Refills: 1 | Status: SHIPPED | OUTPATIENT
Start: 2022-05-03 | End: 2022-06-28 | Stop reason: SDUPTHER

## 2022-05-03 NOTE — PATIENT INSTRUCTIONS
"[facility] DEPARTMENT OF PSYCHIATRY   PATIENT INFORMATION    We appreciate the opportunity to participate in your medical care and hope the following protocols will make it easier for you to receive quality treatment in our department.    PUNCTUALITY: Your appointment is scheduled for a fixed amount of time, reserved especially for you.  To get the benefit of your appointment, please arrive at least 15 minutes early to allow time for traffic, parking and registration.  Should you arrive more than 15 minutes late to your appointment, you will be rescheduled in order to assure your clinician has adequate time to assess you and provide helpful care.      APPOINTMENTS: Appointments are made by the nursing/front office staff or through the patient portal. Providers do not have access  to schedule appointments. Walk in appointments are not available. FOR EMERGENCIES, PLEASE GO THE CLOSEST EMERGENCY ROOM.    CANCELLATION/MISSED APPOINTMENTS:   In order to receive quality care, all appointments must be kept.  If you are unable to keep an appointment, please reschedule at least 3 days prior if possible. Late cancellations (within 24 hours of the appointment) and repeated no-show appointments may result in dismissal from the clinic. After two no show/late cancellation visits, you will receive a notice letter, alerting you to keep visits to prevent department dismissal. If another visit is missed after receipt of the notice, you will be discharged from the clinic. This policy is in effect to allow for other individuals on a long waiting list to be seen as soon as possible. Unlike other branches of medicine where several individuals can be scheduled in a 30 minute time slot, only one individual can be scheduled in any time slot in Psychiatry.     MESSAGES: For simple questions/concerns, you may contact your individual providers electronically through the "[facility]" portal or by calling [phone] " with messages relayed via office staff. If relevant, include pharmacy name and phone number, date of last visit and next scheduled visit, phone number where you can be reached throughout the day, and whether leaving a voicemail or message on an answering machine is acceptable. Messages will be returned by the Medical Assistant or Office Staff after your provider has reviewed the message.  Please allow 24 hours for a returned message before leaving another message. Messages will be checked each workday (Monday through Friday) during office hours (8:00 a.m. and 5:00 p.m.) and returned at most within one business day.  You may leave a non-urgent message after hours. Note that psychotherapy and medication management are not appropriate by telephone or the patient portal.    PRESCRIPTION REFILLS:  Please communicate with your prescriber about any refills you need during your appointment. You may also request refills through the [facility] portal (preferred) or by calling the clinic. Prescriptions will be filled during office hours.     Please do not wait until you are completely out of medication to request refills. Same day refills are not always possible. Patients may experience symptoms of withdrawal if they run out of medications. The patient assumes all responsibility when there is an issue with non-compliance with follow-up appointments and medications.  Some medications are controlled and regulated by the FDA and [place]. Some of these medications can not be refilled before 30 days and require a face to face appointment.     PAPERWORK REQUESTS: If you have any forms or letters that need to be completed by your doctor, please present these at the beginning of the appointment to ensure that information needed to complete them is obtained during the office visit. Paperwork will be returned within 7-10 business days. Staff will call you to  the paperwork when completed.    SPECIAL EVALUATIONS: Please note that our  "department is treatment-focused. As such, we focus on treatment-oriented evaluations and do not perform specialty or "forensic" evaluations. Examples are listed below.    Disability: We do not do disability evaluations.  Please contact Social Security Administration for evaluations and determinations. You will then sign releases allowing for records from your treatment providers to be forwarded to Social Security Administration to use in their evaluation.  Gun Permit: We do not offer Sound Judgment Evaluations or assessments leading to gun ownership, nor do we fill out or file paperwork relevant to owning, concealing or purchasing a firearm.  Emotional Support     Animals (PROSPER): We do not provide documentation, including letters, to aid in the acclamation that an Emotional Support Animal is required. Note that ESAs are not trained to perform tasks or recognize particular signs or symptoms. Rather, they are distinguished by the close, emotional, and supportive bond between the animal and the owner.       SAMPLES: We do not provide samples of any medications. If you have financial difficulties and are on a limited income, you may qualify for Patient Assistance Programs from various pharmaceutical companies. This will require that you complete paperwork with your financial information, but this does not guarantee that the company will approve the application. Alternative medication options can be discussed.    REFERRALS/COORDINATION: You will be referred to other providers if we feel unable to adequately diagnose or treat your particular condition, or if collaboration with another provider would allow for better management of your condition.       Call In if problems  Call Report Side Effects   Encouraged to follow up with primary care / Gen Med MD for continued monitoring of general health and wellness  Call 911 Or go to ER if Acute Concerns (especially if any thoughts of harm to self or other)          Shiloh Mo, " PhD, MP  Advanced Practice Medical Psychologist  [facility]  [address]  [place] [address] [phone]   [phone] fax

## 2022-05-03 NOTE — PROGRESS NOTES
"Outpatient Psychiatry Follow-Up Visit     5/3/2022      Clinical Status of Patient:  Outpatient (Ambulatory)    Chief Complaint:  Danisha Jones is a 31 y.o. male who presents today for follow-up of depression.      Impressions/Plan from last visit: Danisha attended his visit. He had seen his cardiology earlier today about his heartrate spiking after COVID (when exercising). He will be getting a stress test and will wear a heart monitor for 48 hours. We walked through his schedule--gets up around 4:30 or 5 and exercises; works for 7 ()--throughout day; gets home around 4:30 or 5; if he hasn't meal prepped, he cooks or gets a quick meal; stretches before bed; then showers and goes to bed around 9; if time, may play a game or watch tv. On weekends, he sleeps in until 8 or 9; exercises or plays games or walks dog, etc. He has not been getting together with friends much any more--COVID and being in [place] with friends in . He is usually busy during the week--works 5 days. Long-term, he wants to work out of state working in nature as a . He has an interview tomorrow for a job for a summer camp (in [place]). He has a new patient appt next week with Ms. Rangel with [facility] for therapy. Depression is a little better but still not where we want it. He reported that two weeks ago, he and his girlfriend broke up. He said that their relationship was "toxic." She had reportedly said things to him that he could not "forgive." They had been together for 2.5 years--she reportedly has bipolar and was having mood swings. He is focused on himself and his own mental health right now. He has not had as much interest in watching sports or playing video games. We also discussed him getting into social activities with athletics--soccer club, running, rock climbing, etc. We agreed to increase his Zoloft to 50 mg and continue to monitor.    Interval History and Content of Current Session: Danisha attended his " "visit. He reported that he thinks that the medicine is helping. He had some stomach issues and dry mouth initially with the increase but that stopped after a few days. He was not able to move in time for the job interview that he had for the summer; he is in the process of researching teaching in [place] so he will already be there for any future summer opportunities. He is coping fairly well with the post-breakup. He said that he tries to focus on the positives--that he is not with her anymore. He had set boundaries with her and is happy with that. He believes that circumstances are keeping him "not great"--we agreed to continue medicine as prescribed at this time. Zoloft 50 mg    NOTE: He tried to schedule therapy but there was nothing available. Will have staff schedule for him.      GAD7 5/3/2022 3/30/2022 3/1/2022   1. Feeling nervous, anxious, or on edge? 1 1 1   2. Not being able to stop or control worrying? 1 1 1   3. Worrying too much about different things? 1 1 1   4. Trouble relaxing? 1 1 1   5. Being so restless that it is hard to sit still? 0 0 0   6. Becoming easily annoyed or irritable? 0 1 0   7. Feeling afraid as if something awful might happen? 1 1 2   KEN-7 Score 5 6 6      0-4 = Minimal anxiety  5-9 = Mild anxiety  10-14 = Moderate anxiety  15-21 = Severe anxiety       Depression Patient Health Questionnaire 3/30/2022 3/1/2022   Over the last two weeks how often have you been bothered by little interest or pleasure in doing things 2 1   Over the last two weeks how often have you been bothered by feeling down, depressed or hopeless 1 1   PHQ-2 Total Score 3 2   Over the last two weeks how often have you been bothered by trouble falling or staying asleep, or sleeping too much 1 2   Over the last two weeks how often have you been bothered by feeling tired or having little energy 2 2   Over the last two weeks how often have you been bothered by a poor appetite or overeating 1 1   Over the last two " weeks how often have you been bothered by feeling bad about yourself - or that you are a failure or have let yourself or your family down 1 1   Over the last two weeks how often have you been bothered by trouble concentrating on things, such as reading the newspaper or watching television 1 2   Over the last two weeks how often have you been bothered by moving or speaking so slowly that other people could have noticed. Or the opposite - being so fidgety or restless that you have been moving around a lot more than usual. 0 0   Over the last two weeks how often have you been bothered by thoughts that you would be better off dead, or of hurting yourself 0 0   If you checked off any problems, how difficult have these problems made it for you to do your work, take care of things at home or get along with other people? Somewhat difficult Somewhat difficult   Total Score 9 10   Interpretation Mild Moderate     0-4 = No intervention  5 to 9 = Mild  10 to 14 = Moderate  15 to 19 = Moderately severe  =20 = Severe    Review of Systems   · PSYCHIATRIC: Pertinant items are noted in the narrative.    Past Medical, Family and Social History: The patient's past medical, family and social history have been reviewed and updated as appropriate within the electronic medical record - see encounter notes.      Current Outpatient Medications:     sertraline (ZOLOFT) 50 MG tablet, Take 1 tablet (50 mg total) by mouth once daily., Disp: 30 tablet, Rfl: 1    Compliance: yes    Side effects: see above    Risk Parameters:  Patient reports no suicidal ideation  Patient reports no homicidal ideation  Patient reports no self-injurious behavior  Patient reports no violent behavior    Exam (detailed: at least 9 elements; comprehensive: all 15 elements)   Constitutional  Vitals:  Most recent vital signs were reviewed.   Last 3 sets of Vitals    Vitals - 1 value per visit 3/30/2022 4/7/2022 4/7/2022   SYSTOLIC 145 154 154   DIASTOLIC 90 72 72    Pulse 57 - -   Temp - - -   SPO2 - - -   Weight (lb) 189.6 189 189   Weight (kg) 86 85.73 85.73   Height - 72 72   BMI (Calculated) - 25.6 25.6   VISIT REPORT - - -   Pain Score  - - -          General:  age appropriate, bearded, casually dressed, neatly groomed, wearing glasses, wearing mask     Musculoskeletal  Muscle Strength/Tone:  no tremor, no tic   Gait & Station:  non-ataxic     Psychiatric  Speech:  no latency; no press   Behavior: wnl   Mood & Affect:  ok--not bad but not great (circumstantial)  congruent and appropriate   Thought Process:  normal and logical   Associations:  intact   Thought Content:  normal, no suicidality, no homicidality, delusions, or paranoia   Insight:  has awareness of illness   Judgement: behavior is adequate to circumstances   Orientation:  grossly intact   Memory: intact for content of interview   Language: grossly intact   Attention Span & Concentration:  Grossly intact   Fund of Knowledge:  intact and appropriate to age and level of education     Assessment and Diagnosis   Status/Progress: Based on the examination today, the patient's problem(s) is/are improved and adequately but not ideally controlled.  New problems have not been presented today.   Co-morbidities are complicating management of the primary condition.  There are no active rule-out diagnoses for this patient at this time.     General Impression:     Encounter Diagnosis   Name Primary?    Current mild episode of major depressive disorder without prior episode Yes         Intervention/Counseling/Treatment Plan   · Medication Management: Discussed risks, benefits, and alternatives to treatment plan documented above with patient. I answered all patient questions related to this plan, and patient expressed understanding and agreement.   continue Zoloft 50 mg  · continue counseling    Return to Clinic: 2 months    Medication List with Changes/Refills   Current Medications    SERTRALINE (ZOLOFT) 50 MG TABLET    Take  1 tablet (50 mg total) by mouth once daily.        Time spent with pt including note preparation: 18 minutes     Shiloh Mo, PhD, MP  Advanced Practice Medical Psychologist  [facility]  [facility] [address]  [place] [id number] [phone]   [phone] fax

## 2022-05-31 ENCOUNTER — PATIENT MESSAGE (OUTPATIENT)
Dept: PSYCHIATRY | Facility: CLINIC | Age: 32
End: 2022-05-31
Payer: MEDICAID

## 2022-06-28 ENCOUNTER — OFFICE VISIT (OUTPATIENT)
Dept: PSYCHIATRY | Facility: CLINIC | Age: 32
End: 2022-06-28
Payer: MEDICAID

## 2022-06-28 DIAGNOSIS — F32.0 CURRENT MILD EPISODE OF MAJOR DEPRESSIVE DISORDER WITHOUT PRIOR EPISODE: Primary | ICD-10-CM

## 2022-06-28 DIAGNOSIS — F41.9 ANXIETY: ICD-10-CM

## 2022-06-28 PROCEDURE — 99214 OFFICE O/P EST MOD 30 MIN: CPT | Mod: HP,HB,S$PBB, | Performed by: PSYCHOLOGIST

## 2022-06-28 PROCEDURE — 90833 PSYTX W PT W E/M 30 MIN: CPT | Mod: HP,HB,S$PBB, | Performed by: PSYCHOLOGIST

## 2022-06-28 PROCEDURE — 1159F PR MEDICATION LIST DOCUMENTED IN MEDICAL RECORD: ICD-10-PCS | Mod: HP,HB,CPTII, | Performed by: PSYCHOLOGIST

## 2022-06-28 PROCEDURE — 99999 PR PBB SHADOW E&M-EST. PATIENT-LVL I: ICD-10-PCS | Mod: PBBFAC,HB,, | Performed by: PSYCHOLOGIST

## 2022-06-28 PROCEDURE — 99999 PR PBB SHADOW E&M-EST. PATIENT-LVL I: CPT | Mod: PBBFAC,HB,, | Performed by: PSYCHOLOGIST

## 2022-06-28 PROCEDURE — 90833 PR PSYCHOTHERAPY W/PATIENT W/E&M, 30 MIN (ADD ON): ICD-10-PCS | Mod: HP,HB,S$PBB, | Performed by: PSYCHOLOGIST

## 2022-06-28 PROCEDURE — 99214 PR OFFICE/OUTPT VISIT, EST, LEVL IV, 30-39 MIN: ICD-10-PCS | Mod: HP,HB,S$PBB, | Performed by: PSYCHOLOGIST

## 2022-06-28 PROCEDURE — 96127 BRIEF EMOTIONAL/BEHAV ASSMT: CPT | Mod: PBBFAC | Performed by: PSYCHOLOGIST

## 2022-06-28 PROCEDURE — 99211 OFF/OP EST MAY X REQ PHY/QHP: CPT | Mod: PBBFAC | Performed by: PSYCHOLOGIST

## 2022-06-28 PROCEDURE — 1159F MED LIST DOCD IN RCRD: CPT | Mod: HP,HB,CPTII, | Performed by: PSYCHOLOGIST

## 2022-06-28 RX ORDER — SERTRALINE HYDROCHLORIDE 50 MG/1
75 TABLET, FILM COATED ORAL DAILY
Qty: 45 TABLET | Refills: 3 | Status: SHIPPED | OUTPATIENT
Start: 2022-06-28 | End: 2022-08-04

## 2022-06-28 RX ORDER — SERTRALINE HYDROCHLORIDE 50 MG/1
50 TABLET, FILM COATED ORAL DAILY
Qty: 30 TABLET | Refills: 3 | Status: SHIPPED | OUTPATIENT
Start: 2022-06-28 | End: 2022-06-28 | Stop reason: SDUPTHER

## 2022-06-28 NOTE — PROGRESS NOTES
"Outpatient Psychiatry Follow-Up Visit     6/28/2022      Clinical Status of Patient:  Outpatient (Ambulatory)    Chief Complaint:  Danisha Jones is a 31 y.o. male who presents today for follow-up of depression.      Impressions/Plan from last visit: Danisha attended his visit. He reported that he thinks that the medicine is helping. He had some stomach issues and dry mouth initially with the increase but that stopped after a few days. He was not able to move in time for the job interview that he had for the summer; he is in the process of researching teaching in [place] so he will already be there for any future summer opportunities. He is coping fairly well with the post-breakup. He said that he tries to focus on the positives--that he is not with her anymore. He had set boundaries with her and is happy with that. He believes that circumstances are keeping him "not great"--we agreed to continue medicine as prescribed at this time. Zoloft 50 mg     NOTE: He tried to schedule therapy but there was nothing available. Will have staff schedule for him.    Interval History and Content of Current Session: Danisha attended his visit. 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 He plans to work toward certifications while there so that he can continue to pursue the outdoor/wilderness jobs. He denied having any side effects. He said that he has been socializing more--has been racing. He has been running with a group--the last race was a 10-mile trail run in [place], [place] ("[place]"). He lives with his sister--hopes to move out and get his own place. One of the people with whom he runs is a professor at [employer] and has been recruiting him for graduate school (masters degree). He is thinking about it but is not sure that he has the grades. He will further discuss this with her. Encouraged him to observe a class or two to make sure it's an area he likes (even if " that professor were not involved). The travel and research--and nature--appeal to him. Because some of his PHQ items indicated lingering depressive features, we agreed to increase Zoloft and continue to monitor. Plan--increase Zoloft 75 mg.      GAD7 6/28/2022 5/3/2022 3/30/2022   1. Feeling nervous, anxious, or on edge? 2 1 1   2. Not being able to stop or control worrying? 2 1 1   3. Worrying too much about different things? 2 1 1   4. Trouble relaxing? 2 1 1   5. Being so restless that it is hard to sit still? 0 0 0   6. Becoming easily annoyed or irritable? 1 0 1   7. Feeling afraid as if something awful might happen? 0 1 1   KEN-7 Score 9 5 6      0-4 = Minimal anxiety  5-9 = Mild anxiety  10-14 = Moderate anxiety  15-21 = Severe anxiety     (Administered during visit)      Depression Patient Health Questionnaire 6/28/2022 3/30/2022 3/1/2022   Over the last two weeks how often have you been bothered by little interest or pleasure in doing things Several days More than half the days Several days   Over the last two weeks how often have you been bothered by feeling down, depressed or hopeless Several days Several days Several days   PHQ-2 Total Score 2 3 2   Over the last two weeks how often have you been bothered by trouble falling or staying asleep, or sleeping too much More than half the days Several days More than half the days   Over the last two weeks how often have you been bothered by feeling tired or having little energy More than half the days More than half the days More than half the days   Over the last two weeks how often have you been bothered by a poor appetite or overeating Several days Several days Several days   Over the last two weeks how often have you been bothered by feeling bad about yourself - or that you are a failure or have let yourself or your family down Several days Several days Several days   Over the last two weeks how often have you been bothered by trouble concentrating on  things, such as reading the newspaper or watching television Several days Several days More than half the days   Over the last two weeks how often have you been bothered by moving or speaking so slowly that other people could have noticed. Or the opposite - being so fidgety or restless that you have been moving around a lot more than usual. Not at all Not at all Not at all   Over the last two weeks how often have you been bothered by thoughts that you would be better off dead, or of hurting yourself Not at all Not at all Not at all   If you checked off any problems, how difficult have these problems made it for you to do your work, take care of things at home or get along with other people? Very difficult Somewhat difficult Somewhat difficult   Total Score 9 9 10   Interpretation Mild Mild Moderate     0-4 = No intervention  5 to 9 = Mild  10 to 14 = Moderate  15 to 19 = Moderately severe  =20 = Severe    Review of Systems   · PSYCHIATRIC: Pertinant items are noted in the narrative.    Past Medical, Family and Social History: The patient's past medical, family and social history have been reviewed and updated as appropriate within the electronic medical record - see encounter notes.      Current Outpatient Medications:     sertraline (ZOLOFT) 50 MG tablet, Take 1.5 tablets (75 mg total) by mouth once daily., Disp: 45 tablet, Rfl: 3    Compliance: yes    Side effects: None    Risk Parameters:  Patient reports no suicidal ideation  Patient reports no homicidal ideation  Patient reports no self-injurious behavior  Patient reports no violent behavior    Exam (detailed: at least 9 elements; comprehensive: all 15 elements)   Constitutional  Vitals:  Most recent vital signs were reviewed.   Last 3 sets of Vitals    Vitals - 1 value per visit 3/30/2022 4/7/2022 4/7/2022   SYSTOLIC 145 154 154   DIASTOLIC 90 72 72   Pulse 57 - -   Temp - - -   SPO2 - - -   Weight (lb) 189.6 189 189   Weight (kg) 86 85.73 85.73   Height - 72  72   BMI (Calculated) - 25.6 25.6   VISIT REPORT - - -   Pain Score  - - -          General:  age appropriate, casually dressed, neatly groomed, wearing glasses, wearing mask     Musculoskeletal  Muscle Strength/Tone:  no tremor, no tic   Gait & Station:  non-ataxic     Psychiatric  Speech:  no latency; no press   Behavior: wnl   Mood & Affect:  steady  congruent and appropriate   Thought Process:  normal and logical   Associations:  intact   Thought Content:  normal, no suicidality, no homicidality, delusions, or paranoia   Insight:  has awareness of illness   Judgement: behavior is adequate to circumstances   Orientation:  grossly intact   Memory: intact for content of interview   Language: grossly intact   Attention Span & Concentration:  Grossly intact   Fund of Knowledge:  intact and appropriate to age and level of education     Assessment and Diagnosis   Status/Progress: Based on the examination today, the patient's problem(s) is/are adequately but not ideally controlled.  New problems have been presented today.   Co-morbidities are complicating management of the primary condition.  There are no active rule-out diagnoses for this patient at this time.     General Impression:     Encounter Diagnoses   Name Primary?    Current mild episode of major depressive disorder without prior episode Yes    Anxiety          Intervention/Counseling/Treatment Plan   · Medication Management: Discussed risks, benefits, and alternatives to treatment plan documented above with patient. I answered all patient questions related to this plan, and patient expressed understanding and agreement.   increase Zoloft 75 mg  · counseling as needed   · Observe class--further discuss options    Return to Clinic: 3 months    Medication List with Changes/Refills   Changed and/or Refilled Medications    Modified Medication Previous Medication    SERTRALINE (ZOLOFT) 50 MG TABLET sertraline (ZOLOFT) 50 MG tablet       Take 1.5 tablets (75 mg  total) by mouth once daily.    Take 1 tablet (50 mg total) by mouth once daily.        I spent an additional 12 minutes performing E/M services with >50% spent on counseling, guidance, coordinating care (not Psychotherapy related) in addition to the 18 minutes performing Psychotherapy.    Time spent with pt including note preparation: 30 minutes     Shiloh Mo, PhD, MP  Advanced Practice Medical Psychologist  [facility]  [address]  [place] [address]  [phone]   [phone] fax

## 2022-06-28 NOTE — PATIENT INSTRUCTIONS
"[facility] DEPARTMENT OF PSYCHIATRY   PATIENT INFORMATION    We appreciate the opportunity to participate in your medical care and hope the following protocols will make it easier for you to receive quality treatment in our department.    PUNCTUALITY: Your appointment is scheduled for a fixed amount of time, reserved especially for you.  To get the benefit of your appointment, please arrive at least 15 minutes early to allow time for traffic, parking and registration.  Should you arrive more than 15 minutes late to your appointment, you will be rescheduled in order to assure your clinician has adequate time to assess you and provide helpful care.      APPOINTMENTS: Appointments are made by the nursing/front office staff or through the patient portal. Providers do not have access  to schedule appointments. Walk in appointments are not available. FOR EMERGENCIES, PLEASE GO THE CLOSEST EMERGENCY ROOM.    CANCELLATION/MISSED APPOINTMENTS:   In order to receive quality care, all appointments must be kept.  If you are unable to keep an appointment, please reschedule at least 3 days prior if possible. Late cancellations (within 24 hours of the appointment) and repeated no-show appointments may result in dismissal from the clinic. After two no show/late cancellation visits, you will receive a notice letter, alerting you to keep visits to prevent department dismissal. If another visit is missed after receipt of the notice, you will be discharged from the clinic. This policy is in effect to allow for other individuals on a long waiting list to be seen as soon as possible. Unlike other branches of medicine where several individuals can be scheduled in a 30 minute time slot, only one individual can be scheduled in any time slot in Psychiatry.     MESSAGES: For simple questions/concerns, you may contact your individual providers electronically through the "[facility]" portal or by calling [phone] " with messages relayed via office staff. If relevant, include pharmacy name and phone number, date of last visit and next scheduled visit, phone number where you can be reached throughout the day, and whether leaving a voicemail or message on an answering machine is acceptable. Messages will be returned by the Medical Assistant or Office Staff after your provider has reviewed the message.  Please allow 24 hours for a returned message before leaving another message. Messages will be checked each workday (Monday through Friday) during office hours (8:00 a.m. and 5:00 p.m.) and returned at most within one business day.  You may leave a non-urgent message after hours. Note that psychotherapy and medication management are not appropriate by telephone or the patient portal.    PRESCRIPTION REFILLS:  Please communicate with your prescriber about any refills you need during your appointment. You may also request refills through the [facility] portal (preferred) or by calling the clinic. Prescriptions will be filled during office hours.     Please do not wait until you are completely out of medication to request refills. Same day refills are not always possible. Patients may experience symptoms of withdrawal if they run out of medications. The patient assumes all responsibility when there is an issue with non-compliance with follow-up appointments and medications.  Some medications are controlled and regulated by the FDA and [place]. Some of these medications can not be refilled before 30 days and require a face to face appointment.     PAPERWORK REQUESTS: If you have any forms or letters that need to be completed by your doctor, please present these at the beginning of the appointment to ensure that information needed to complete them is obtained during the office visit. Paperwork will be returned within 7-10 business days. Staff will call you to  the paperwork when completed.    SPECIAL EVALUATIONS: Please note that our  "department is treatment-focused. As such, we focus on treatment-oriented evaluations and do not perform specialty or "forensic" evaluations. Examples are listed below.    Disability: We do not do disability evaluations.  Please contact Social Security Administration for evaluations and determinations. You will then sign releases allowing for records from your treatment providers to be forwarded to Social Security Administration to use in their evaluation.  Gun Permit: We do not offer Sound Judgment Evaluations or assessments leading to gun ownership, nor do we fill out or file paperwork relevant to owning, concealing or purchasing a firearm.  Emotional Support     Animals (PROSPRE): We do not provide documentation, including letters, to aid in the acclamation that an Emotional Support Animal is required. Note that ESAs are not trained to perform tasks or recognize particular signs or symptoms. Rather, they are distinguished by the close, emotional, and supportive bond between the animal and the owner.       SAMPLES: We do not provide samples of any medications. If you have financial difficulties and are on a limited income, you may qualify for Patient Assistance Programs from various pharmaceutical companies. This will require that you complete paperwork with your financial information, but this does not guarantee that the company will approve the application. Alternative medication options can be discussed.    REFERRALS/COORDINATION: You will be referred to other providers if we feel unable to adequately diagnose or treat your particular condition, or if collaboration with another provider would allow for better management of your condition.       Call In if problems  Call Report Side Effects   Encouraged to follow up with primary care / Gen Med MD for continued monitoring of general health and wellness  Call 911 Or go to ER if Acute Concerns (especially if any thoughts of harm to self or other)          Shiloh Mo, " PhD, MP  Advanced Practice Medical Psychologist  [facility]  [address]  [place] [id number] [phone]   [phone] fax

## 2022-07-01 ENCOUNTER — PATIENT MESSAGE (OUTPATIENT)
Dept: PULMONOLOGY | Facility: CLINIC | Age: 32
End: 2022-07-01
Payer: MEDICAID

## 2022-07-01 ENCOUNTER — OFFICE VISIT (OUTPATIENT)
Dept: CARDIOLOGY | Facility: CLINIC | Age: 32
End: 2022-07-01
Payer: MEDICAID

## 2022-07-01 VITALS
OXYGEN SATURATION: 97 % | SYSTOLIC BLOOD PRESSURE: 130 MMHG | WEIGHT: 200.19 LBS | HEART RATE: 58 BPM | DIASTOLIC BLOOD PRESSURE: 86 MMHG | HEIGHT: 72 IN | BODY MASS INDEX: 27.11 KG/M2

## 2022-07-01 DIAGNOSIS — R00.2 PALPITATIONS: ICD-10-CM

## 2022-07-01 DIAGNOSIS — R00.9 HEART RATE PROBLEM: ICD-10-CM

## 2022-07-01 DIAGNOSIS — R06.09 EXERTIONAL DYSPNEA: Primary | ICD-10-CM

## 2022-07-01 DIAGNOSIS — D64.9 ANEMIA, UNSPECIFIED TYPE: ICD-10-CM

## 2022-07-01 DIAGNOSIS — R07.9 CHEST PAIN, UNSPECIFIED TYPE: ICD-10-CM

## 2022-07-01 DIAGNOSIS — F32.0 CURRENT MILD EPISODE OF MAJOR DEPRESSIVE DISORDER WITHOUT PRIOR EPISODE: ICD-10-CM

## 2022-07-01 DIAGNOSIS — Z86.16 HISTORY OF COVID-19: ICD-10-CM

## 2022-07-01 DIAGNOSIS — R06.09 OTHER FORM OF DYSPNEA: ICD-10-CM

## 2022-07-01 PROCEDURE — 1160F PR REVIEW ALL MEDS BY PRESCRIBER/CLIN PHARMACIST DOCUMENTED: ICD-10-PCS | Mod: CPTII,,, | Performed by: INTERNAL MEDICINE

## 2022-07-01 PROCEDURE — 1160F RVW MEDS BY RX/DR IN RCRD: CPT | Mod: CPTII,,, | Performed by: INTERNAL MEDICINE

## 2022-07-01 PROCEDURE — 3079F PR MOST RECENT DIASTOLIC BLOOD PRESSURE 80-89 MM HG: ICD-10-PCS | Mod: CPTII,,, | Performed by: INTERNAL MEDICINE

## 2022-07-01 PROCEDURE — 99214 PR OFFICE/OUTPT VISIT, EST, LEVL IV, 30-39 MIN: ICD-10-PCS | Mod: S$PBB,,, | Performed by: INTERNAL MEDICINE

## 2022-07-01 PROCEDURE — 99999 PR PBB SHADOW E&M-EST. PATIENT-LVL IV: ICD-10-PCS | Mod: PBBFAC,,, | Performed by: INTERNAL MEDICINE

## 2022-07-01 PROCEDURE — 1159F PR MEDICATION LIST DOCUMENTED IN MEDICAL RECORD: ICD-10-PCS | Mod: CPTII,,, | Performed by: INTERNAL MEDICINE

## 2022-07-01 PROCEDURE — 3008F PR BODY MASS INDEX (BMI) DOCUMENTED: ICD-10-PCS | Mod: CPTII,,, | Performed by: INTERNAL MEDICINE

## 2022-07-01 PROCEDURE — 1159F MED LIST DOCD IN RCRD: CPT | Mod: CPTII,,, | Performed by: INTERNAL MEDICINE

## 2022-07-01 PROCEDURE — 3008F BODY MASS INDEX DOCD: CPT | Mod: CPTII,,, | Performed by: INTERNAL MEDICINE

## 2022-07-01 PROCEDURE — 3079F DIAST BP 80-89 MM HG: CPT | Mod: CPTII,,, | Performed by: INTERNAL MEDICINE

## 2022-07-01 PROCEDURE — 3075F PR MOST RECENT SYSTOLIC BLOOD PRESS GE 130-139MM HG: ICD-10-PCS | Mod: CPTII,,, | Performed by: INTERNAL MEDICINE

## 2022-07-01 PROCEDURE — 99214 OFFICE O/P EST MOD 30 MIN: CPT | Mod: S$PBB,,, | Performed by: INTERNAL MEDICINE

## 2022-07-01 PROCEDURE — 99214 OFFICE O/P EST MOD 30 MIN: CPT | Mod: PBBFAC | Performed by: INTERNAL MEDICINE

## 2022-07-01 PROCEDURE — 99999 PR PBB SHADOW E&M-EST. PATIENT-LVL IV: CPT | Mod: PBBFAC,,, | Performed by: INTERNAL MEDICINE

## 2022-07-01 PROCEDURE — 3075F SYST BP GE 130 - 139MM HG: CPT | Mod: CPTII,,, | Performed by: INTERNAL MEDICINE

## 2022-07-01 NOTE — PROGRESS NOTES
"Subjective:   Patient ID:  Danisha Jones is a 31 y.o. male who presents for cardiac consult of Shortness of Breath      HPI  The patient came in today for cardiac consult of Shortness of Breath    Referring Physician: Stephanie Scott MD  Reason for consult: CP      Danisha Jones is a 31 y.o. male with h/o COVID 19, h/o elevated BP, chest pain, anxiety presents for follow up CV evaluation.     3/20/19  He presented to Dr. Scott's office two days ago and complained of chest pain. He complains of exertional CP, started last year occurs 5 x week.   About the same as in the past feels like a pinching type. Occurs when he does free weights, does not feel with treadmill. Positional movements increase pain.     6/21/19  2D ECHO overall normal Bi V function and valves. Bp improved today. CP has improved with NSAIDS, still sore at times after working out. Occ feels dizzy upon standing.   Still does weights/cardio.     3/30/22  Follow up since 6/2019.     From PCP office " (Concerns about HR when doing strenuous exercise). Pt reports since he had Covid infection mid January 2022, he has noticed higher elevations of his heart rate with exertional dyspnea. Pt runs regularly and notices changes in his HR. Prior to Covid his heart rate for an "easy run" would maintain at 160bpm and he was able to complete a 3 mile run in about 30 minutes. Post-Covid his HR goes in the 170-180s range causing him to slow down during easy runs and takes him about 7-8 minutes longer to complete his 3 mile run"    Today Bp 130s/80s. HR 50s. He has started Zoloft in Feb for depression/anxiety. He has more fatigue now since having COVID. He runs marathons, did one last year is trying to train for marathon again.     7/1/22  ECHO 4/2022 with normal bi V function, ECG stress test neg for ischemia, HTN response noted. BP peak 215/73. Holter with rare ectopy, AVG HR 60 bpm. BP and Hr well controlled today. BMI 27 - 200 lbs. CP has resolved, has " more MICHELLE when walking up stairs.     Patient feels no sob, no leg swelling, no PND, no dizziness, no syncope, no CNS symptoms.    Patient has fairly good exercise tolerance. 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    FH - no CV disease    ECG - NSR    Results for orders placed during the hospital encounter of 04/07/22    Exercise Stress - EKG    Interpretation Summary    The EKG portion of this study is negative for ischemia.    The exercise capacity was normal.    Stress ECG: There was hypertensive blood pressure response with stress.      Results for orders placed during the hospital encounter of 04/07/22    Echo    Interpretation Summary  · Concentric remodeling and normal systolic function.  · The estimated ejection fraction is 55%.  · Normal left ventricular diastolic function.  · Normal right ventricular size with normal right ventricular systolic function.  · Normal central venous pressure (3 mmHg).  · The estimated PA systolic pressure is 24 mmHg.          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 No pertinent surgical history.    Social History     Tobacco Use    Smoking status: Never Smoker    Smokeless tobacco: Never Used   Substance Use Topics    Alcohol use: Not Currently    Drug use: No       Family History   Problem Relation Age of Onset    Anemia Mother     Asthma Mother     Diabetes Father     Hypertension Father     Anemia Sister     No Known Problems Brother     No Known Problems Sister        Patient's Medications   New Prescriptions    No medications on file   Previous Medications    SERTRALINE (ZOLOFT) 50 MG TABLET    Take 1.5 tablets (75 mg total) by mouth once daily.   Modified Medications    No medications on file   Discontinued Medications    No medications on file       Review of Systems   Constitutional: Positive for  malaise/fatigue.   HENT: Negative.    Eyes: Negative.    Respiratory: Positive for shortness of breath.    Cardiovascular: Positive for chest pain and palpitations.   Gastrointestinal: Negative.    Genitourinary: Negative.    Musculoskeletal: Negative.    Skin: Negative.    Neurological: Negative.    Endo/Heme/Allergies: Negative.    Psychiatric/Behavioral: Negative.    All 12 systems otherwise negative.      Wt Readings from Last 3 Encounters:   07/01/22 90.8 kg (200 lb 2.8 oz)   04/07/22 85.7 kg (189 lb)   04/07/22 85.7 kg (189 lb)     Temp Readings from Last 3 Encounters:   03/14/22 98.1 °F (36.7 °C) (Tympanic)   02/25/22 96.8 °F (36 °C) (Temporal)   02/03/22 97.5 °F (36.4 °C) (Tympanic)     BP Readings from Last 3 Encounters:   07/01/22 130/86   04/07/22 (!) 154/72   04/07/22 (!) 154/72     Pulse Readings from Last 3 Encounters:   07/01/22 (!) 58   03/30/22 (!) 56   03/14/22 71       /86 (BP Location: Left arm, Patient Position: Sitting, BP Method: Medium (Manual))   Pulse (!) 58   Ht 6' (1.829 m)   Wt 90.8 kg (200 lb 2.8 oz)   SpO2 97%   BMI 27.15 kg/m²     Objective:   Physical Exam  Vitals and nursing note reviewed.   Constitutional:       General: He is not in acute distress.     Appearance: He is well-developed. He is not diaphoretic.   HENT:      Head: Normocephalic and atraumatic.      Nose: Nose normal.   Eyes:      General: No scleral icterus.     Conjunctiva/sclera: Conjunctivae normal.   Neck:      Thyroid: No thyromegaly.      Vascular: No JVD.   Cardiovascular:      Rate and Rhythm: Normal rate and regular rhythm.      Heart sounds: S1 normal and S2 normal. No murmur heard.    No friction rub. No gallop. No S3 or S4 sounds.   Pulmonary:      Effort: Pulmonary effort is normal. No respiratory distress.      Breath sounds: Normal breath sounds. No stridor. No wheezing or rales.   Chest:      Chest wall: No tenderness.   Abdominal:      General: Bowel sounds are normal. There is no  distension.      Palpations: Abdomen is soft. There is no mass.      Tenderness: There is no abdominal tenderness. There is no rebound.   Genitourinary:     Comments: Deferred  Musculoskeletal:         General: No tenderness or deformity. Normal range of motion.      Cervical back: Normal range of motion and neck supple.   Lymphadenopathy:      Cervical: No cervical adenopathy.   Skin:     General: Skin is warm and dry.      Coloration: Skin is not pale.      Findings: No erythema or rash.   Neurological:      Mental Status: He is alert and oriented to person, place, and time.      Motor: No abnormal muscle tone.      Coordination: Coordination normal.   Psychiatric:         Behavior: Behavior normal.         Thought Content: Thought content normal.         Judgment: Judgment normal.         Lab Results   Component Value Date     02/07/2022    K 4.0 02/07/2022     02/07/2022    CO2 26 02/07/2022    BUN 17 02/07/2022    CREATININE 1.0 02/07/2022    GLU 84 02/07/2022    AST 29 02/07/2022    ALT 20 02/07/2022    ALBUMIN 4.5 02/07/2022    PROT 7.2 02/07/2022    BILITOT 0.5 02/07/2022    WBC 6.18 03/01/2022    HGB 13.2 (L) 03/01/2022    HCT 42.2 03/01/2022    MCV 85 03/01/2022     03/01/2022    TSH 0.544 02/07/2022    CHOL 160 02/07/2022    HDL 59 02/07/2022    LDLCALC 94.6 02/07/2022    TRIG 32 02/07/2022     Assessment:      1. Exertional dyspnea    2. Chest pain, unspecified type    3. Heart rate problem    4. History of COVID-19    5. Palpitations    6. Anemia, unspecified type    7. Other form of dyspnea    8. Current mild episode of major depressive disorder without prior episode        Plan:   1. Chest pain, atypical   - discussed may be due to costochondritis - NSAIDS PRN  - if symptoms worse needs more urgent eval  - neg stress and ECHO 4/2022    2. Elevated BP - stable now   - monitor at home  - decrease salt intake    3. H/O COVID 19 with palpitations/tachycardia with ongoing MICHELLE  - refer to  pulm  - ECHO 4/2022 with normal bi V function,   - ECG stress test neg for ischemia, HTN response noted. BP peak 215/73.   - Holter with rare ectopy, AVG HR 60 bpm.     4. Anemia, unsure etiology   - iron studies - neg  - f/u PCP or heme onc if low    5. Depression  - cont Tx per psych     Thank you for allowing me to participate in this patient's care. Please do not hesitate to contact me with any questions or concerns. Consult note has been forwarded to the referral physician.

## 2022-07-06 DIAGNOSIS — R06.02 SOB (SHORTNESS OF BREATH): Primary | ICD-10-CM

## 2022-07-15 ENCOUNTER — OFFICE VISIT (OUTPATIENT)
Dept: PULMONOLOGY | Facility: CLINIC | Age: 32
End: 2022-07-15
Payer: MEDICAID

## 2022-07-15 ENCOUNTER — HOSPITAL ENCOUNTER (OUTPATIENT)
Dept: RADIOLOGY | Facility: HOSPITAL | Age: 32
Discharge: HOME | End: 2022-07-15
Attending: NURSE PRACTITIONER
Payer: MEDICAID

## 2022-07-15 VITALS
HEIGHT: 72 IN | RESPIRATION RATE: 18 BRPM | OXYGEN SATURATION: 97 % | WEIGHT: 199.5 LBS | HEART RATE: 59 BPM | BODY MASS INDEX: 27.02 KG/M2 | SYSTOLIC BLOOD PRESSURE: 128 MMHG | DIASTOLIC BLOOD PRESSURE: 84 MMHG

## 2022-07-15 DIAGNOSIS — F41.8 DEPRESSION WITH ANXIETY: ICD-10-CM

## 2022-07-15 DIAGNOSIS — R06.02 SOB (SHORTNESS OF BREATH) ON EXERTION: Primary | ICD-10-CM

## 2022-07-15 DIAGNOSIS — R06.09 OTHER FORM OF DYSPNEA: ICD-10-CM

## 2022-07-15 DIAGNOSIS — R06.02 SOB (SHORTNESS OF BREATH): ICD-10-CM

## 2022-07-15 PROCEDURE — 1159F MED LIST DOCD IN RCRD: CPT | Mod: CPTII,,, | Performed by: NURSE PRACTITIONER

## 2022-07-15 PROCEDURE — 1160F RVW MEDS BY RX/DR IN RCRD: CPT | Mod: CPTII,,, | Performed by: NURSE PRACTITIONER

## 2022-07-15 PROCEDURE — 71046 X-RAY EXAM CHEST 2 VIEWS: CPT | Mod: 26,,, | Performed by: RADIOLOGY

## 2022-07-15 PROCEDURE — 1159F PR MEDICATION LIST DOCUMENTED IN MEDICAL RECORD: ICD-10-PCS | Mod: CPTII,,, | Performed by: NURSE PRACTITIONER

## 2022-07-15 PROCEDURE — 3074F PR MOST RECENT SYSTOLIC BLOOD PRESSURE < 130 MM HG: ICD-10-PCS | Mod: CPTII,,, | Performed by: NURSE PRACTITIONER

## 2022-07-15 PROCEDURE — 99999 PR PBB SHADOW E&M-EST. PATIENT-LVL III: ICD-10-PCS | Mod: PBBFAC,,, | Performed by: NURSE PRACTITIONER

## 2022-07-15 PROCEDURE — 99215 OFFICE O/P EST HI 40 MIN: CPT | Mod: S$PBB,,, | Performed by: NURSE PRACTITIONER

## 2022-07-15 PROCEDURE — 71046 XR CHEST PA AND LATERAL: ICD-10-PCS | Mod: 26,,, | Performed by: RADIOLOGY

## 2022-07-15 PROCEDURE — 71046 X-RAY EXAM CHEST 2 VIEWS: CPT | Mod: TC

## 2022-07-15 PROCEDURE — 3079F DIAST BP 80-89 MM HG: CPT | Mod: CPTII,,, | Performed by: NURSE PRACTITIONER

## 2022-07-15 PROCEDURE — 1160F PR REVIEW ALL MEDS BY PRESCRIBER/CLIN PHARMACIST DOCUMENTED: ICD-10-PCS | Mod: CPTII,,, | Performed by: NURSE PRACTITIONER

## 2022-07-15 PROCEDURE — 99999 PR PBB SHADOW E&M-EST. PATIENT-LVL III: CPT | Mod: PBBFAC,,, | Performed by: NURSE PRACTITIONER

## 2022-07-15 PROCEDURE — 3008F BODY MASS INDEX DOCD: CPT | Mod: CPTII,,, | Performed by: NURSE PRACTITIONER

## 2022-07-15 PROCEDURE — 99213 OFFICE O/P EST LOW 20 MIN: CPT | Mod: PBBFAC,25 | Performed by: NURSE PRACTITIONER

## 2022-07-15 PROCEDURE — 3079F PR MOST RECENT DIASTOLIC BLOOD PRESSURE 80-89 MM HG: ICD-10-PCS | Mod: CPTII,,, | Performed by: NURSE PRACTITIONER

## 2022-07-15 PROCEDURE — 99215 PR OFFICE/OUTPT VISIT, EST, LEVL V, 40-54 MIN: ICD-10-PCS | Mod: S$PBB,,, | Performed by: NURSE PRACTITIONER

## 2022-07-15 PROCEDURE — 3008F PR BODY MASS INDEX (BMI) DOCUMENTED: ICD-10-PCS | Mod: CPTII,,, | Performed by: NURSE PRACTITIONER

## 2022-07-15 PROCEDURE — 3074F SYST BP LT 130 MM HG: CPT | Mod: CPTII,,, | Performed by: NURSE PRACTITIONER

## 2022-07-15 RX ORDER — ALBUTEROL SULFATE 90 UG/1
2 AEROSOL, METERED RESPIRATORY (INHALATION) EVERY 4 HOURS PRN
Qty: 18 G | Refills: 11 | Status: SHIPPED | OUTPATIENT
Start: 2022-07-15 | End: 2023-03-05 | Stop reason: SDUPTHER

## 2022-07-22 ENCOUNTER — PATIENT MESSAGE (OUTPATIENT)
Dept: INTERNAL MEDICINE | Facility: CLINIC | Age: 32
End: 2022-07-22
Payer: MEDICAID

## 2022-07-26 ENCOUNTER — HOSPITAL ENCOUNTER (OUTPATIENT)
Dept: RADIOLOGY | Facility: HOSPITAL | Age: 32
Discharge: HOME | End: 2022-07-26
Attending: PHYSICIAN ASSISTANT
Payer: MEDICAID

## 2022-07-26 ENCOUNTER — OFFICE VISIT (OUTPATIENT)
Dept: INTERNAL MEDICINE | Facility: CLINIC | Age: 32
End: 2022-07-26
Payer: MEDICAID

## 2022-07-26 VITALS
SYSTOLIC BLOOD PRESSURE: 142 MMHG | HEIGHT: 72 IN | TEMPERATURE: 99 F | BODY MASS INDEX: 26.96 KG/M2 | DIASTOLIC BLOOD PRESSURE: 78 MMHG | WEIGHT: 199.06 LBS | OXYGEN SATURATION: 97 % | HEART RATE: 88 BPM

## 2022-07-26 DIAGNOSIS — G89.29 CHRONIC HIP PAIN, LEFT: ICD-10-CM

## 2022-07-26 DIAGNOSIS — M25.552 CHRONIC HIP PAIN, LEFT: ICD-10-CM

## 2022-07-26 DIAGNOSIS — M25.511 CHRONIC RIGHT SHOULDER PAIN: ICD-10-CM

## 2022-07-26 DIAGNOSIS — M25.511 CHRONIC RIGHT SHOULDER PAIN: Primary | ICD-10-CM

## 2022-07-26 DIAGNOSIS — G89.29 CHRONIC RIGHT SHOULDER PAIN: ICD-10-CM

## 2022-07-26 DIAGNOSIS — G89.29 CHRONIC RIGHT SHOULDER PAIN: Primary | ICD-10-CM

## 2022-07-26 PROCEDURE — 99213 PR OFFICE/OUTPT VISIT, EST, LEVL III, 20-29 MIN: ICD-10-PCS | Mod: S$PBB,,, | Performed by: PHYSICIAN ASSISTANT

## 2022-07-26 PROCEDURE — 99213 OFFICE O/P EST LOW 20 MIN: CPT | Mod: S$PBB,,, | Performed by: PHYSICIAN ASSISTANT

## 2022-07-26 PROCEDURE — 1159F PR MEDICATION LIST DOCUMENTED IN MEDICAL RECORD: ICD-10-PCS | Mod: CPTII,,, | Performed by: PHYSICIAN ASSISTANT

## 2022-07-26 PROCEDURE — 1160F RVW MEDS BY RX/DR IN RCRD: CPT | Mod: CPTII,,, | Performed by: PHYSICIAN ASSISTANT

## 2022-07-26 PROCEDURE — 73502 X-RAY EXAM HIP UNI 2-3 VIEWS: CPT | Mod: TC,LT

## 2022-07-26 PROCEDURE — 99999 PR PBB SHADOW E&M-EST. PATIENT-LVL IV: ICD-10-PCS | Mod: PBBFAC,,, | Performed by: PHYSICIAN ASSISTANT

## 2022-07-26 PROCEDURE — 73030 X-RAY EXAM OF SHOULDER: CPT | Mod: 26,RT,, | Performed by: RADIOLOGY

## 2022-07-26 PROCEDURE — 73502 X-RAY EXAM HIP UNI 2-3 VIEWS: CPT | Mod: 26,LT,, | Performed by: RADIOLOGY

## 2022-07-26 PROCEDURE — 3077F SYST BP >= 140 MM HG: CPT | Mod: CPTII,,, | Performed by: PHYSICIAN ASSISTANT

## 2022-07-26 PROCEDURE — 3008F PR BODY MASS INDEX (BMI) DOCUMENTED: ICD-10-PCS | Mod: CPTII,,, | Performed by: PHYSICIAN ASSISTANT

## 2022-07-26 PROCEDURE — 73030 XR SHOULDER COMPLETE 2 OR MORE VIEWS RIGHT: ICD-10-PCS | Mod: 26,RT,, | Performed by: RADIOLOGY

## 2022-07-26 PROCEDURE — 3008F BODY MASS INDEX DOCD: CPT | Mod: CPTII,,, | Performed by: PHYSICIAN ASSISTANT

## 2022-07-26 PROCEDURE — 99999 PR PBB SHADOW E&M-EST. PATIENT-LVL IV: CPT | Mod: PBBFAC,,, | Performed by: PHYSICIAN ASSISTANT

## 2022-07-26 PROCEDURE — 99214 OFFICE O/P EST MOD 30 MIN: CPT | Mod: PBBFAC | Performed by: PHYSICIAN ASSISTANT

## 2022-07-26 PROCEDURE — 3078F PR MOST RECENT DIASTOLIC BLOOD PRESSURE < 80 MM HG: ICD-10-PCS | Mod: CPTII,,, | Performed by: PHYSICIAN ASSISTANT

## 2022-07-26 PROCEDURE — 73502 XR HIP WITH PELVIS WHEN PERFORMED, 2 OR 3 VIEWS LEFT: ICD-10-PCS | Mod: 26,LT,, | Performed by: RADIOLOGY

## 2022-07-26 PROCEDURE — 3077F PR MOST RECENT SYSTOLIC BLOOD PRESSURE >= 140 MM HG: ICD-10-PCS | Mod: CPTII,,, | Performed by: PHYSICIAN ASSISTANT

## 2022-07-26 PROCEDURE — 1159F MED LIST DOCD IN RCRD: CPT | Mod: CPTII,,, | Performed by: PHYSICIAN ASSISTANT

## 2022-07-26 PROCEDURE — 3078F DIAST BP <80 MM HG: CPT | Mod: CPTII,,, | Performed by: PHYSICIAN ASSISTANT

## 2022-07-26 PROCEDURE — 73030 X-RAY EXAM OF SHOULDER: CPT | Mod: TC,RT

## 2022-07-26 PROCEDURE — 1160F PR REVIEW ALL MEDS BY PRESCRIBER/CLIN PHARMACIST DOCUMENTED: ICD-10-PCS | Mod: CPTII,,, | Performed by: PHYSICIAN ASSISTANT

## 2022-07-26 RX ORDER — NAPROXEN 500 MG/1
500 TABLET ORAL 2 TIMES DAILY WITH MEALS
Qty: 30 TABLET | Refills: 0 | Status: SHIPPED | OUTPATIENT
Start: 2022-07-26 | End: 2022-09-13

## 2022-07-26 RX ORDER — PREDNISONE 20 MG/1
20 TABLET ORAL 2 TIMES DAILY
Qty: 10 TABLET | Refills: 0 | Status: SHIPPED | OUTPATIENT
Start: 2022-07-26 | End: 2022-07-31

## 2022-07-26 NOTE — PROGRESS NOTES
Subjective:       Patient ID: Danisha Jones is a 31 y.o. male.    Chief Complaint: Shoulder Pain and Hip Pain      Patient Care Team:  Stephanie Scott MD as PCP - General (Family Medicine)  Gladys Kessler PA-C as Physician Assistant (Internal Medicine)    DELORES Jones is a 31 y.o. male who presents today with complaints of Shoulder Pain and Hip Pain  Reports right shoulder pain for a few years, stabbing in nature, no known injury or trauma, pain with movement - flexion and abduction, location is anterior joint, non-radiating, reports tingling to right hand intermittently, unsure which fingers. No treatments tried.     Reports left lateral and anterior hip pain that is aching in nature, non-radiating. Started last year, improved, but returned recently, worse with flexion. No paresthesias in left leg. No known injury or trauma. Can be worse with running.     3-4 days per week he runs about 15 miles per week    Review of Systems   Constitutional: Negative for chills and fever.   Respiratory: Negative for shortness of breath.    Cardiovascular: Negative for chest pain.   Musculoskeletal: Positive for arthralgias and myalgias. Negative for back pain, joint swelling and neck pain.   Neurological: Positive for numbness. Negative for weakness.       Objective:      Physical Exam  Vitals and nursing note reviewed.   Constitutional:       General: He is not in acute distress.     Appearance: He is well-developed.   HENT:      Head: Normocephalic and atraumatic.   Eyes:      General: Lids are normal. No scleral icterus.     Extraocular Movements: Extraocular movements intact.      Conjunctiva/sclera: Conjunctivae normal.   Cardiovascular:      Pulses:           Radial pulses are 2+ on the right side.        Dorsalis pedis pulses are 2+ on the right side and 2+ on the left side.   Pulmonary:      Effort: Pulmonary effort is normal.   Musculoskeletal:      Right shoulder: Tenderness ( anterior shoulder  joint) present. No swelling or deformity. Normal range of motion. Normal strength. Normal pulse.      Left hip: Tenderness ( greater trochanter) present. Normal range of motion. Normal strength.      Right lower leg: No edema.      Left lower leg: No edema.      Comments: Pos neers test on right  Pain with internal and external rotation of left leg  Gait is normal and without assistance   Neurological:      Mental Status: He is alert.      Cranial Nerves: No cranial nerve deficit.   Psychiatric:         Mood and Affect: Mood and affect normal.         Assessment:       1. Chronic right shoulder pain    2. Chronic hip pain, left        Plan:   1. Chronic right shoulder pain  -     X-ray Shoulder 2 or More Views Right    2. Chronic hip pain, left  -     X-Ray Hip 2 or 3 views Left (with Pelvis when performed)  -     naproxen (NAPROSYN) 500 MG tablet  -     predniSONE (DELTASONE) 20 MG tablet      Patient given verbal instructions for alternating warm and cool compresses, medication use, stretches at home. If no improvement in 2 weeks, consider PT referral and/or ortho referral.     X-Ray Hip 2 or 3 views Left (with Pelvis when performed)  Narrative: EXAMINATION:  XR HIP WITH PELVIS WHEN PERFORMED, 2 OR 3 VIEWS LEFT    CLINICAL HISTORY:  Pain in left hip    TECHNIQUE:  AP view of the pelvis and frog leg lateral view of the left hip were performed.    COMPARISON:  None    FINDINGS:  No acute fracture or dislocation.  No evidence of AVN.  No change since 07/03/2019  Impression: Please see above    Electronically signed by: Geoff Ott MD  Date:    07/26/2022  Time:    16:04  X-ray Shoulder 2 or More Views Right  Narrative: EXAMINATION:  XR SHOULDER COMPLETE 2 OR MORE VIEWS RIGHT    CLINICAL HISTORY:  Pain in right shoulder    TECHNIQUE:  Two or three views of the right shoulder were performed.    COMPARISON:  None    FINDINGS:  No acute fracture or dislocation.  Visualized right lung is clear.  Soft tissues appear  normal.  Impression: See above    Electronically signed by: Geoff Ott MD  Date:    07/26/2022  Time:    16:03

## 2022-08-02 ENCOUNTER — PATIENT MESSAGE (OUTPATIENT)
Dept: PSYCHIATRY | Facility: CLINIC | Age: 32
End: 2022-08-02
Payer: MEDICAID

## 2022-08-04 DIAGNOSIS — F32.0 CURRENT MILD EPISODE OF MAJOR DEPRESSIVE DISORDER WITHOUT PRIOR EPISODE: ICD-10-CM

## 2022-08-04 RX ORDER — SERTRALINE HYDROCHLORIDE 100 MG/1
100 TABLET, FILM COATED ORAL DAILY
Qty: 30 TABLET | Refills: 1 | Status: SHIPPED | OUTPATIENT
Start: 2022-08-04 | End: 2022-09-28 | Stop reason: SDUPTHER

## 2022-09-13 ENCOUNTER — OFFICE VISIT (OUTPATIENT)
Dept: PULMONOLOGY | Facility: CLINIC | Age: 32
End: 2022-09-13
Payer: MEDICAID

## 2022-09-13 ENCOUNTER — CLINICAL SUPPORT (OUTPATIENT)
Dept: PULMONOLOGY | Facility: CLINIC | Age: 32
End: 2022-09-13
Payer: MEDICAID

## 2022-09-13 VITALS
HEIGHT: 72 IN | WEIGHT: 203.94 LBS | HEART RATE: 65 BPM | HEIGHT: 72 IN | WEIGHT: 203.94 LBS | RESPIRATION RATE: 17 BRPM | BODY MASS INDEX: 27.62 KG/M2 | OXYGEN SATURATION: 97 % | BODY MASS INDEX: 27.62 KG/M2

## 2022-09-13 DIAGNOSIS — J45.990 ASTHMA, EXERCISE INDUCED: Primary | ICD-10-CM

## 2022-09-13 DIAGNOSIS — R06.02 SOB (SHORTNESS OF BREATH) ON EXERTION: ICD-10-CM

## 2022-09-13 DIAGNOSIS — F41.8 DEPRESSION WITH ANXIETY: ICD-10-CM

## 2022-09-13 DIAGNOSIS — J45.990 ASTHMA, EXERCISE INDUCED: ICD-10-CM

## 2022-09-13 PROCEDURE — 99999 PR PBB SHADOW E&M-EST. PATIENT-LVL III: CPT | Mod: PBBFAC,,, | Performed by: NURSE PRACTITIONER

## 2022-09-13 PROCEDURE — 1160F PR REVIEW ALL MEDS BY PRESCRIBER/CLIN PHARMACIST DOCUMENTED: ICD-10-PCS | Mod: CPTII,,, | Performed by: NURSE PRACTITIONER

## 2022-09-13 PROCEDURE — 94729 DIFFUSING CAPACITY: CPT | Mod: 26,S$PBB,, | Performed by: INTERNAL MEDICINE

## 2022-09-13 PROCEDURE — 94726 PULM FUNCT TST PLETHYSMOGRAP: ICD-10-PCS | Mod: 26,S$PBB,, | Performed by: INTERNAL MEDICINE

## 2022-09-13 PROCEDURE — 99211 OFF/OP EST MAY X REQ PHY/QHP: CPT | Mod: PBBFAC

## 2022-09-13 PROCEDURE — 94060 EVALUATION OF WHEEZING: CPT | Mod: 26,59,S$PBB, | Performed by: INTERNAL MEDICINE

## 2022-09-13 PROCEDURE — 95012 NITRIC OXIDE EXP GAS DETER: CPT | Mod: PBBFAC

## 2022-09-13 PROCEDURE — 94729 DIFFUSING CAPACITY: CPT | Mod: PBBFAC

## 2022-09-13 PROCEDURE — 3008F PR BODY MASS INDEX (BMI) DOCUMENTED: ICD-10-PCS | Mod: CPTII,,, | Performed by: NURSE PRACTITIONER

## 2022-09-13 PROCEDURE — 94726 PLETHYSMOGRAPHY LUNG VOLUMES: CPT | Mod: 26,S$PBB,, | Performed by: INTERNAL MEDICINE

## 2022-09-13 PROCEDURE — 99999 PR PBB SHADOW E&M-EST. PATIENT-LVL I: CPT | Mod: PBBFAC,,,

## 2022-09-13 PROCEDURE — 1159F MED LIST DOCD IN RCRD: CPT | Mod: CPTII,,, | Performed by: NURSE PRACTITIONER

## 2022-09-13 PROCEDURE — 99213 OFFICE O/P EST LOW 20 MIN: CPT | Mod: 25,S$PBB,, | Performed by: NURSE PRACTITIONER

## 2022-09-13 PROCEDURE — 3008F BODY MASS INDEX DOCD: CPT | Mod: CPTII,,, | Performed by: NURSE PRACTITIONER

## 2022-09-13 PROCEDURE — 99999 PR PBB SHADOW E&M-EST. PATIENT-LVL III: ICD-10-PCS | Mod: PBBFAC,,, | Performed by: NURSE PRACTITIONER

## 2022-09-13 PROCEDURE — 94060 EVALUATION OF WHEEZING: CPT | Mod: PBBFAC

## 2022-09-13 PROCEDURE — 1159F PR MEDICATION LIST DOCUMENTED IN MEDICAL RECORD: ICD-10-PCS | Mod: CPTII,,, | Performed by: NURSE PRACTITIONER

## 2022-09-13 PROCEDURE — 1160F RVW MEDS BY RX/DR IN RCRD: CPT | Mod: CPTII,,, | Performed by: NURSE PRACTITIONER

## 2022-09-13 PROCEDURE — 99213 PR OFFICE/OUTPT VISIT, EST, LEVL III, 20-29 MIN: ICD-10-PCS | Mod: 25,S$PBB,, | Performed by: NURSE PRACTITIONER

## 2022-09-13 PROCEDURE — 94726 PLETHYSMOGRAPHY LUNG VOLUMES: CPT | Mod: PBBFAC

## 2022-09-13 PROCEDURE — 99999 PR PBB SHADOW E&M-EST. PATIENT-LVL I: ICD-10-PCS | Mod: PBBFAC,,,

## 2022-09-13 PROCEDURE — 94618 PULMONARY STRESS TESTING: CPT | Mod: PBBFAC

## 2022-09-13 PROCEDURE — 94618 PULMONARY STRESS TESTING: ICD-10-PCS | Mod: 26,S$PBB,, | Performed by: INTERNAL MEDICINE

## 2022-09-13 PROCEDURE — 94729 PR C02/MEMBANE DIFFUSE CAPACITY: ICD-10-PCS | Mod: 26,S$PBB,, | Performed by: INTERNAL MEDICINE

## 2022-09-13 PROCEDURE — 99213 OFFICE O/P EST LOW 20 MIN: CPT | Mod: PBBFAC,27 | Performed by: NURSE PRACTITIONER

## 2022-09-13 PROCEDURE — 94060 PR EVAL OF BRONCHOSPASM: ICD-10-PCS | Mod: 26,59,S$PBB, | Performed by: INTERNAL MEDICINE

## 2022-09-13 PROCEDURE — 94618 PULMONARY STRESS TESTING: CPT | Mod: 26,S$PBB,, | Performed by: INTERNAL MEDICINE

## 2022-09-13 NOTE — PROCEDURES
"[facility]  Six Minute Walk   SUMMARY   Ordering Provider: Hawa Rankin NP   Interpreting Provider: Kayode Chowdhury MD  Performing nurse/tech/RT: LS, RT  Diagnosis:  (SOBOE)  Height: 6' 0.44" (184 cm)  Weight: 92.5 kg (203 lb 14.8 oz)  BMI (Calculated): 27.3   Patient Race:     Phase Oxygen Assessment Supplemental O2 Heart   Rate Blood Pressure Betty Dyspnea Scale Rating   Resting 98 % Room Air 62 bpm 140/89 0   Exercise        Minute        1 99 % Room Air 85 bpm     2 98 % Room Air 88 bpm     3 98 % Room Air 87 bpm     4 96 % Room Air 87 bpm     5 96 % Room Air 90 bpm     6  98 % Room Air 88 bpm 153/90 3   Recovery        Minute        1 98 % Room Air 83 bpm     2 98 % Room Air 67 bpm     3 98 % Room Air 64 bpm     4 98 % Room Air 59 bpm 150/88 1     Six Minute Walk Summary  6MWT Status: completed without stopping  Patient Reported: No complaints     Interpretation:  Did the patient stop or pause?: No            Total Time Walked (Calculated): 360 seconds  Final Partial Lap Distance (feet): 50 feet  Total Distance Meters (Calculated): 441.96 meters  Predicted Distance Meters (Calculated): 770.48 meters  Percentage of Predicted (Calculated): 57.36  Peak VO2 (Calculated): 17.24  Mets: 4.93  Has The Patient Had a Previous Six Minute Walk Test?: No       Previous 6MWT Results  Has The Patient Had a Previous Six Minute Walk Test?: No           CLINICAL INTERPRETATION:  Six minute walk distance is 441.96m (57.36 % predicted) with light dyspnea.  During exercise, there was no significant desaturation while breathing room air.  The patient did not report non-pulmonary symptoms during exercise.  The patient did complete the study, walking 360 seconds of the 360 second test.  Based upon age and body mass index, exercise capacity is normal.      [] Mild exercise-induced hypoxemia described as an arterial oxygen saturation of 93-95% (or 3-4% less than at rest)  []  Moderate " exercise-induced hypoxemia as 89-93%  []  Severe exercise induced hypoxemia as < 89% O2 saturation.  Medicare Criteria for oxygen prescription comments:   []  When arterial oxygen saturation is at or below 88% during exercise (severe exercise induced hypoxemia) then the patient falls under Medicare Group 1 criteria for supplemental oxygen

## 2022-09-13 NOTE — PROCEDURES
"Clinical Guide to Interpretation or FeNO Levels :    FeNO  (ppb) LOW INTERMEDIATE HIGH   ADULT VALUES < 25 25-50          > 50   Th2-driven Inflammation Unlikely Likely Significant     Patients FeNO level at this visit :  24  (ppb)    Interpretation of FeNO measurement in adults:   [X ] FENO is less than 25 ppb implies non eosinophilic airway inflammation or the absence of airway inflammation.   Comment: Low FENO (<25 ppb) in adult asthmatics with persistent symptoms suggests other etiologies for these symptoms and a lower likelihood of benefit from adding or increasing inhaled glucocorticoids.     [ ] FENO between 25 and 50 ppb in adults should be interpreted cautiously with reference to the clinical situation (eg, symptomatic, on or off therapy, current smoking).     [ ] FENO greater than 50 ppb in adults  suggests eosinophilic airway inflammation   Comment: High FENO (>50 ppb) in adult asthmatics even with atypical symptoms suggests glucocorticoid responsiveness. High FENO (>50 ppb) can help identify poor adherence or uncontrolled inflammation in asthma patients with otherwise seemingly "controlled" asthma.     Discussion:   ·A FENO less than 25 ppb in adults and less than 20 ppb in children younger than 12 years of age implies noneosinophilic airway inflammation or the absence of airway inflammation.   ·A FENO greater than 50 ppb in adults or greater than 35 ppb in children suggests eosinophilic airway inflammation.   ·Values of FENO between 25 and 50 ppb in adults (20 to 35 ppb in children) should be interpreted cautiously with reference to the clinical situation (eg, symptomatic, on or off therapy, current smoking).   ·A rising FENO with a greater than 20 percent change and more than 25 ppb (20 ppb in children) from a previously stable level suggests increasing eosinophilic airway inflammation, but there are wide inter-individual differences.   ·A decrease in FENO greater than 20 percent for values over 50 ppb " or more than 10 ppb for values less than 50 ppb may be clinically important.   FENO in other respiratory diseases - Several other diseases are associated with altered levels of exhaled NO: low levels of FENO have been noted in cystic fibrosis, current smoking, pulmonary hypertension, hypothermia, primary ciliary dyskinesia, and bronchopulmonary dysplasia. Elevated FENO has been noted in atopy, nonasthmatic eosinophilic bronchitis, COPD exacerbations, noncystic fibrosis bronchiectasis, and viral upper respiratory infections.     REFERENCE:   ATS Board of Directors, December 2004, and by the ERS Executive Committee, June 2004. ATS/ERS Recommendations for Standardized Procedures for the Online and Offline Measurement of Exhaled Lower Respiratory Nitric Oxide and Nasal Nitric Oxide. Guideline 2005

## 2022-09-13 NOTE — PROGRESS NOTES
Subjective:      Patient ID: Danisha Jones is a 32 y.o. male.    Patient Active Problem List   Diagnosis    Herpes simplex virus type 1 (HSV-1) dermatitis    Depression with anxiety    Asthma, exercise induced       he has been referred by  for evaluation and management for   Chief Complaint   Patient presents with    Shortness of Breath       Chief Complaint: Shortness of Breath      HPI:  Danisha Jones is a 32 y.o. male presents to pulmonary clinic follow up evaluation related to shortness of breath with review CPFT.     9/13/2022 CPFT normal spirometry FEV1 90.0%. Positive bronchodilator response suggestive of asthma FEV1 108.5%. Normal lung volumes. Normal DLCO.     Albuterol used before running with complete relief of shortness of breath with running and going up stairs.     9/13/2022 new diagnosis exercise induced asthma.     7/15/2022 pulmonary office note  Patient is a marathon runner and noticed significant shortness of breath with easy runs and going up 2 flights of stairs over the past about 1 year. He noticed increased heart rates from 160's with runs to 170's-180's, which is still occurring. Yesterday's run Ht rate up to 185 so stopped and walked to reduce heart rate, then continued run. He wears a heart rate monitor around in chest that links to his [employer] watch.    He is able to continue run about 3 - 5 miles despite increased heart rates and shortness of breath.   The shortness of breath does not continue the whole run, shortness of breath is while running noticed when elevated heart rate in 180's.   He takes stairs when going into buildings instead of taking elevator and over past year notices shortness of breath with climbing 2 or more flights.  Other symptom of nasal congestion and having to breath through mouth while running.     There is no cough, no wheezing, no hemoptysis, no sputum production, no weight loss, noted TB exposure, no asbestos exposure, no chest pain when shortness of  breath occurs.  He has complaint of chest pain that last occurred few nights ago when laying on left side, burning type sensation under left pectoral muscle region. He changed sleeping position and burning pain resolved.   Never diagnosis with asthma. Never wheezing or cough.     Mom has asthma.     No inhalers have been tried for exertional shortness of breath.     He has had cardiac work up for chest pain and increased heart rate concerns with running.   Neg stress and ECHO 4/2022  ECHO 4/2022 with normal bi V function,   ECG stress test neg for ischemia, HTN response noted. BP peak 215/73.   Holter with rare ectopy, AVG HR 60 bpm.    He had covid-19 in Jan 2022. He had symptoms of sore throat, body aches, rhinorrhea, dry cough lasted about 4-5 days. He feels well recovered from Covid, no residual effects. He does state he wanted chest xray today to make sure no problems from covid.     He states he has anxiety with depression.     7/15/2022 chest xray is normal. Lungs clear.     Dyspnea Characteristics:   Exertional Dyspnea   Dyspnea Duration:  Chronic over the past 1 year.   Dyspnea Severity:  ARASH 3  Dyspnea Timing:  running when heart is 180. climbing 2 or more flights of stairs  Dyspnea Contributing Factors:  anxiety    Dyspnea Associated Symptoms:  tachycardia  . No wheezing. No cough. No chest pain associated.      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 No occupational exposure to Asbestos, Silica,  Petrochemicals,  Fiber glass,  Saw Dust,  Grain Dust and  Pesticides. He would like to work as an outdoor teacher. Teaching kids about out door environments.     Avocational Exposure:   None currently.       Pet Exposures:  Dogs one inside dog. Denies allergy to Dog and  cat dander.      Previous Report Reviewed: lab reports, office notes, radiology reports and cardiology reports     Past Medical History: The following portions of the patient's history were reviewed and  updated as appropriate:   He  has no past surgical history on file.  His family history includes Anemia in his mother and sister; Asthma in his mother; Diabetes in his father; Hypertension in his father; No Known Problems in his brother and sister.  He  reports that he has never smoked. He has never used smokeless tobacco. He reports that he does not currently use alcohol. He reports that he does not use drugs.  He has a current medication list which includes the following prescription(s): albuterol and sertraline.  He has No Known Allergies..    Review of Systems   Constitutional:  Negative for fever, chills, weight loss, weight gain, activity change, appetite change, fatigue and night sweats.   HENT:  Negative for postnasal drip, rhinorrhea, sinus pressure, voice change and congestion.    Eyes:  Negative for redness and itching.   Respiratory:  Negative for snoring, cough, sputum production, chest tightness, shortness of breath (resolved with use of albuterol inhaler before running), wheezing, orthopnea, asthma nighttime symptoms, dyspnea on extertion, use of rescue inhaler and somnolence.    Cardiovascular: Negative.  Negative for chest pain, palpitations and leg swelling.   Genitourinary:  Negative for difficulty urinating and hematuria.   Endocrine:  Negative for cold intolerance and heat intolerance.    Musculoskeletal:  Negative for arthralgias, gait problem, joint swelling and myalgias.   Skin: Negative.    Gastrointestinal:  Negative for nausea, vomiting, abdominal pain and acid reflux.   Neurological:  Negative for dizziness, weakness, light-headedness and headaches.   Hematological:  Negative for adenopathy. No excessive bruising.   Psychiatric/Behavioral:  The patient is nervous/anxious.    All other systems reviewed and are negative.     Objective:   Pulse 65   Resp 17   Ht 6' (1.829 m)   Wt 92.5 kg (203 lb 14.8 oz)   SpO2 97%   BMI 27.66 kg/m²   Physical Exam  Vitals and nursing note reviewed.    Constitutional:       General: He is not in acute distress.     Appearance: He is well-developed. He is not ill-appearing or toxic-appearing.   HENT:      Head: Normocephalic and atraumatic.      Right Ear: External ear normal.      Left Ear: External ear normal.      Nose: Nose normal.      Mouth/Throat:      Pharynx: No oropharyngeal exudate.   Eyes:      Conjunctiva/sclera: Conjunctivae normal.   Cardiovascular:      Rate and Rhythm: Normal rate and regular rhythm.      Heart sounds: Normal heart sounds.   Pulmonary:      Effort: Pulmonary effort is normal.      Breath sounds: Normal breath sounds.   Abdominal:      Palpations: Abdomen is soft.   Musculoskeletal:      Cervical back: Normal range of motion and neck supple.   Skin:     General: Skin is warm and dry.   Neurological:      Mental Status: He is alert and oriented to person, place, and time.   Psychiatric:         Behavior: Behavior normal. Behavior is cooperative.         Thought Content: Thought content normal.         Judgment: Judgment normal.     Personal Diagnostic Review    9/13/2022 FeNO 24   Clinical Guide to Interpretation or FeNO Levels :     FeNO  (ppb) LOW INTERMEDIATE HIGH   ADULT VALUES < 25 25-50          > 50   Th2-driven Inflammation Unlikely Likely Significant      Patients FeNO level at this visit :        24  (ppb)    Results for orders placed during the hospital encounter of 07/15/22    X-Ray Chest PA And Lateral    Narrative  EXAMINATION:  XR CHEST PA AND LATERAL    CLINICAL HISTORY:  Shortness of breath    TECHNIQUE:  PA and lateral views of the chest were performed.    COMPARISON:  02/25/2022    FINDINGS:  The cardiac and mediastinal silhouettes appear within normal limits.   The lungs are clear bilaterally.  No acute osseous findings demonstrated.    Impression  No acute findings.      Electronically signed by: Jose L Gutierrez MD  Date:    07/15/2022  Time:    08:10    X-Ray Hip 2 or 3 views Left (with Pelvis when  performed)  Narrative: EXAMINATION:  XR HIP WITH PELVIS WHEN PERFORMED, 2 OR 3 VIEWS LEFT    CLINICAL HISTORY:  Pain in left hip    TECHNIQUE:  AP view of the pelvis and frog leg lateral view of the left hip were performed.    COMPARISON:  None    FINDINGS:  No acute fracture or dislocation.  No evidence of AVN.  No change since 2019  Impression: Please see above    Electronically signed by: Geoff Ott MD  Date:    2022  Time:    16:04  X-ray Shoulder 2 or More Views Right  Narrative: EXAMINATION:  XR SHOULDER COMPLETE 2 OR MORE VIEWS RIGHT    CLINICAL HISTORY:  Pain in right shoulder    TECHNIQUE:  Two or three views of the right shoulder were performed.    COMPARISON:  None    FINDINGS:  No acute fracture or dislocation.  Visualized right lung is clear.  Soft tissues appear normal.  Impression: See above    Electronically signed by: Geoff Ott MD  Date:    2022  Time:    16:03    2022 48 hour holter  Predominant Rhythm Sinus rhythm with heart rates varying between 39 and 150 BPM with an average of 60BPM.     Monitoring started at 8:25 AM and continued for 47 hr 59 min. The average heart rate was 60 BPM. The minimum heart  rate was 39 BPM, occurring at 12:37:58 AM D1. The maximum heart rate was 150 BPM, occurring at 6:59:27 AM D2.    2022 Cardiac stress  The EKG portion of this study is negative for ischemia.    The exercise capacity was normal.    Stress ECG: There was hypertensive blood pressure response with stress.    Assessment:     1. Asthma, exercise induced    2. Depression with anxiety        Orders Placed This Encounter   Procedures    Fraction of  Nitric Oxide     Standing Status:   Future     Number of Occurrences:   1     Standing Expiration Date:   2023     Order Specific Question:   Release to patient     Answer:   Immediate    Spirometry with/without bronchodilator     Standing Status:   Future     Standing Expiration Date:   2023     Order Specific  Question:   Release to patient     Answer:   Immediate    Fraction of  Nitric Oxide     Standing Status:   Future     Standing Expiration Date:   2023     Order Specific Question:   Release to patient     Answer:   Immediate       Medication List with Changes/Refills   Current Medications    ALBUTEROL (PROVENTIL/VENTOLIN HFA) 90 MCG/ACTUATION INHALER    Inhale 2 puffs into the lungs every 4 (four) hours as needed for Wheezing or Shortness of Breath. Rescue    SERTRALINE (ZOLOFT) 100 MG TABLET    Take 1 tablet (100 mg total) by mouth once daily.   Discontinued Medications    NAPROXEN (NAPROSYN) 500 MG TABLET    Take 1 tablet (500 mg total) by mouth 2 (two) times daily with meals. Take with food     Plan:   Discussed diagnosis, its evaluation, treatment and usual course. All questions answered.  Problem List Items Addressed This Visit       Depression with anxiety    Current Assessment & Plan     Managed by primary care provider, was referred to psych.  On Zoloft          Asthma, exercise induced - Primary    Overview     Albuterol inhaler          Current Assessment & Plan     New onset onset age 32  Controlled Albuterol inhaler before exercise  2022 CPFT normal spirometry FEV1 90.0%. Positive bronchodilator response suggestive of asthma FEV1 108.5%. Normal lung volumes. Normal DLCO.   2022 FeNO 24, no inflammation of lungs suggested  Continue Albuterol inhaler as needed  If new onset symptoms or worsen then consider adding ICS controller  Follow up 6 months review ann/feno, follow up sooner if needed           Relevant Orders    Fraction of  Nitric Oxide (Completed)    Spirometry with/without bronchodilator    Fraction of  Nitric Oxide     No LOS data to display  This includes face to face time and non-face to face time preparing to see the patient (eg, review of tests), obtaining and/or reviewing separately obtained history, documenting clinical information in the electronic or  other health record, independently interpreting results and communicating results to the patient/family/caregiver, or care coordinator.    Follow up in about 6 months (around 3/13/2023) for Asthma review ann/feno.

## 2022-09-13 NOTE — PROGRESS NOTES
Clinical Guide to Interpretation or FeNO Levels :    FeNO  (ppb) LOW INTERMEDIATE HIGH   ADULT VALUES < 25 25-50          > 50   Th2-driven Inflammation Unlikely Likely Significant     Patients FeNO level at this visit :  24  (ppb)

## 2022-09-14 PROBLEM — J45.990 ASTHMA, EXERCISE INDUCED: Status: ACTIVE | Noted: 2022-09-14

## 2022-09-14 LAB
BRPFT: NORMAL
DLCO ADJ PRE: 30.17 ML/(MIN*MMHG)
DLCO SINGLE BREATH LLN: 30.2
DLCO SINGLE BREATH PRE REF: 81.3 %
DLCO SINGLE BREATH REF: 37.12
DLCOC SBVA LLN: 3.7
DLCOC SBVA PRE REF: 140.6 %
DLCOC SBVA REF: 4.87
DLCOC SINGLE BREATH LLN: 30.2
DLCOC SINGLE BREATH PRE REF: 81.3 %
DLCOC SINGLE BREATH REF: 37.12
DLCOVA LLN: 3.7
DLCOVA PRE REF: 140.6 %
DLCOVA PRE: 6.85 ML/(MIN*MMHG*L)
DLCOVA REF: 4.87
DLVAADJ PRE: 6.85 ML/(MIN*MMHG*L)
ERV LLN: -16448.35
ERV PRE REF: 99.7 %
ERV REF: 1.65
FEF 25 75 CHG: 56.2 %
FEF 25 75 LLN: 2.38
FEF 25 75 POST REF: 105.6 %
FEF 25 75 PRE REF: 67.6 %
FEF 25 75 REF: 4.21
FET100 CHG: -0.3 %
FEV1 CHG: 20.6 %
FEV1 FVC CHG: 10.8 %
FEV1 FVC LLN: 72
FEV1 FVC POST REF: 97.4 %
FEV1 FVC PRE REF: 87.9 %
FEV1 FVC REF: 83
FEV1 LLN: 3.2
FEV1 POST REF: 108.5 %
FEV1 PRE REF: 90 %
FEV1 REF: 4.13
FRCPLETH LLN: 2.5
FRCPLETH PREREF: 91.8 %
FRCPLETH REF: 3.49
FVC CHG: 8.8 %
FVC LLN: 3.95
FVC POST REF: 110.9 %
FVC PRE REF: 101.9 %
FVC REF: 5
IVC PRE: 3.28 L
IVC SINGLE BREATH LLN: 3.95
IVC SINGLE BREATH PRE REF: 65.6 %
IVC SINGLE BREATH REF: 5
MVV LLN: 155
MVV PRE REF: 55.5 %
MVV REF: 182
PEF CHG: 12.9 %
PEF LLN: 7.41
PEF POST REF: 76.7 %
PEF PRE REF: 68 %
PEF REF: 10.25
POST FEF 25 75: 4.45 L/S
POST FET 100: 8.04 SEC
POST FEV1 FVC: 80.85 %
POST FEV1: 4.49 L
POST FVC: 5.55 L
POST PEF: 7.87 L/S
PRE DLCO: 30.17 ML/(MIN*MMHG)
PRE ERV: 1.64 L
PRE FEF 25 75: 2.85 L/S
PRE FET 100: 8.06 SEC
PRE FEV1 FVC: 72.97 %
PRE FEV1: 3.72 L
PRE FRC PL: 3.2 L
PRE FVC: 5.1 L
PRE MVV: 101 L/MIN
PRE PEF: 6.97 L/S
PRE RV: 1.78 L
PRE TLC: 7.44 L
RAW LLN: 3.06
RAW PRE REF: 98.3 %
RAW PRE: 3.01 CMH2O*S/L
RAW REF: 3.06
RV LLN: 1.17
RV PRE REF: 96.7 %
RV REF: 1.84
RVTLC LLN: 17
RVTLC PRE REF: 93.2 %
RVTLC PRE: 23.91 %
RVTLC REF: 26
TLC LLN: 6.47
TLC PRE REF: 97.6 %
TLC REF: 7.62
VA PRE: 4.57 L
VA SINGLE BREATH LLN: 7.47
VA SINGLE BREATH PRE REF: 61.2 %
VA SINGLE BREATH REF: 7.47
VC LLN: 3.95
VC PRE REF: 113.2 %
VC PRE: 5.66 L
VC REF: 5
VTGRAWPRE: 3.28 L

## 2022-09-15 NOTE — ASSESSMENT & PLAN NOTE
New onset onset age 32  Controlled Albuterol inhaler before exercise  9/13/2022 CPFT normal spirometry FEV1 90.0%. Positive bronchodilator response suggestive of asthma FEV1 108.5%. Normal lung volumes. Normal DLCO.   9/13/2022 FeNO 24, no inflammation of lungs suggested  Continue Albuterol inhaler as needed  If new onset symptoms or worsen then consider adding ICS controller  Follow up 6 months review ann/feno, follow up sooner if needed

## 2022-09-28 ENCOUNTER — OFFICE VISIT (OUTPATIENT)
Dept: PSYCHIATRY | Facility: CLINIC | Age: 32
End: 2022-09-28
Payer: MEDICAID

## 2022-09-28 VITALS — HEART RATE: 60 BPM | SYSTOLIC BLOOD PRESSURE: 145 MMHG | DIASTOLIC BLOOD PRESSURE: 83 MMHG

## 2022-09-28 DIAGNOSIS — F41.9 ANXIETY: Primary | ICD-10-CM

## 2022-09-28 DIAGNOSIS — F32.0 CURRENT MILD EPISODE OF MAJOR DEPRESSIVE DISORDER WITHOUT PRIOR EPISODE: ICD-10-CM

## 2022-09-28 PROCEDURE — 3077F PR MOST RECENT SYSTOLIC BLOOD PRESSURE >= 140 MM HG: ICD-10-PCS | Mod: HP,HB,CPTII, | Performed by: PSYCHOLOGIST

## 2022-09-28 PROCEDURE — 1159F PR MEDICATION LIST DOCUMENTED IN MEDICAL RECORD: ICD-10-PCS | Mod: HP,HB,CPTII, | Performed by: PSYCHOLOGIST

## 2022-09-28 PROCEDURE — 99213 PR OFFICE/OUTPT VISIT, EST, LEVL III, 20-29 MIN: ICD-10-PCS | Mod: HP,HB,S$PBB, | Performed by: PSYCHOLOGIST

## 2022-09-28 PROCEDURE — 1159F MED LIST DOCD IN RCRD: CPT | Mod: HP,HB,CPTII, | Performed by: PSYCHOLOGIST

## 2022-09-28 PROCEDURE — 3079F DIAST BP 80-89 MM HG: CPT | Mod: HP,HB,CPTII, | Performed by: PSYCHOLOGIST

## 2022-09-28 PROCEDURE — 99213 OFFICE O/P EST LOW 20 MIN: CPT | Mod: HP,HB,S$PBB, | Performed by: PSYCHOLOGIST

## 2022-09-28 PROCEDURE — 99999 PR PBB SHADOW E&M-EST. PATIENT-LVL II: CPT | Mod: PBBFAC,HB,, | Performed by: PSYCHOLOGIST

## 2022-09-28 PROCEDURE — 3077F SYST BP >= 140 MM HG: CPT | Mod: HP,HB,CPTII, | Performed by: PSYCHOLOGIST

## 2022-09-28 PROCEDURE — 3079F PR MOST RECENT DIASTOLIC BLOOD PRESSURE 80-89 MM HG: ICD-10-PCS | Mod: HP,HB,CPTII, | Performed by: PSYCHOLOGIST

## 2022-09-28 PROCEDURE — 99999 PR PBB SHADOW E&M-EST. PATIENT-LVL II: ICD-10-PCS | Mod: PBBFAC,HB,, | Performed by: PSYCHOLOGIST

## 2022-09-28 PROCEDURE — 99212 OFFICE O/P EST SF 10 MIN: CPT | Mod: PBBFAC | Performed by: PSYCHOLOGIST

## 2022-09-28 RX ORDER — SERTRALINE HYDROCHLORIDE 100 MG/1
100 TABLET, FILM COATED ORAL DAILY
Qty: 30 TABLET | Refills: 2 | Status: SHIPPED | OUTPATIENT
Start: 2022-09-28 | End: 2023-01-06

## 2022-09-28 NOTE — PATIENT INSTRUCTIONS
"[facility] DEPARTMENT OF PSYCHIATRY   PATIENT INFORMATION    We appreciate the opportunity to participate in your medical care and hope the following protocols will make it easier for you to receive quality treatment in our department.    PUNCTUALITY: Your appointment is scheduled for a fixed amount of time, reserved especially for you.  To get the benefit of your appointment, please arrive at least 15 minutes early to allow time for traffic, parking and registration.  Should you arrive more than 15 minutes late to your appointment, you will be rescheduled in order to assure your clinician has adequate time to assess you and provide helpful care.      APPOINTMENTS: Appointments are made by the nursing/front office staff or through the patient portal. Providers do not have access  to schedule appointments. Walk in appointments are not available. FOR EMERGENCIES, PLEASE GO THE CLOSEST EMERGENCY ROOM.    CANCELLATION/MISSED APPOINTMENTS:   In order to receive quality care, all appointments must be kept.  If you are unable to keep an appointment, please reschedule at least 3 days prior if possible. Late cancellations (within 24 hours of the appointment) and repeated no-show appointments may result in dismissal from the clinic. After two no show/late cancellation visits, you will receive a notice letter, alerting you to keep visits to prevent department dismissal. If another visit is missed after receipt of the notice, you will be discharged from the clinic. This policy is in effect to allow for other individuals on a long waiting list to be seen as soon as possible. Unlike other branches of medicine where several individuals can be scheduled in a 30 minute time slot, only one individual can be scheduled in any time slot in Psychiatry.     MESSAGES: For simple questions/concerns, you may contact your individual providers electronically through the "[facility]" portal or by calling [phone] " with messages relayed via office staff. If relevant, include pharmacy name and phone number, date of last visit and next scheduled visit, phone number where you can be reached throughout the day, and whether leaving a voicemail or message on an answering machine is acceptable. Messages will be returned by the Medical Assistant or Office Staff after your provider has reviewed the message.  Please allow 24 hours for a returned message before leaving another message. Messages will be checked each workday (Monday through Friday) during office hours (8:00 a.m. and 5:00 p.m.) and returned at most within one business day.  You may leave a non-urgent message after hours. Note that psychotherapy and medication management are not appropriate by telephone or the patient portal.    PRESCRIPTION REFILLS:  Please communicate with your prescriber about any refills you need during your appointment. You may also request refills through the [facility] portal (preferred) or by calling the clinic. Prescriptions will be filled during office hours.     Please do not wait until you are completely out of medication to request refills. Same day refills are not always possible. Patients may experience symptoms of withdrawal if they run out of medications. The patient assumes all responsibility when there is an issue with non-compliance with follow-up appointments and medications.  Some medications are controlled and regulated by the FDA and [place]. Some of these medications can not be refilled before 30 days and require a face to face appointment.     PAPERWORK REQUESTS: If you have any forms or letters that need to be completed by your doctor, please present these at the beginning of the appointment to ensure that information needed to complete them is obtained during the office visit. Paperwork will be returned within 7-10 business days. Staff will call you to  the paperwork when completed.    SPECIAL EVALUATIONS: Please note that our  "department is treatment-focused. As such, we focus on treatment-oriented evaluations and do not perform specialty or "forensic" evaluations. Examples are listed below.    Disability: We do not do disability evaluations.  Please contact Social Security Administration for evaluations and determinations. You will then sign releases allowing for records from your treatment providers to be forwarded to Social Security Administration to use in their evaluation.  Gun Permit: We do not offer Sound Judgment Evaluations or assessments leading to gun ownership, nor do we fill out or file paperwork relevant to owning, concealing or purchasing a firearm.  Emotional Support     Animals (PROSPER): We do not provide documentation, including letters, to aid in the acclamation that an Emotional Support Animal is required. Note that ESAs are not trained to perform tasks or recognize particular signs or symptoms. Rather, they are distinguished by the close, emotional, and supportive bond between the animal and the owner.       SAMPLES: We do not provide samples of any medications. If you have financial difficulties and are on a limited income, you may qualify for Patient Assistance Programs from various pharmaceutical companies. This will require that you complete paperwork with your financial information, but this does not guarantee that the company will approve the application. Alternative medication options can be discussed.    REFERRALS/COORDINATION: You will be referred to other providers if we feel unable to adequately diagnose or treat your particular condition, or if collaboration with another provider would allow for better management of your condition.       Call In if problems  Call Report Side Effects   Encouraged to follow up with primary care / Gen Med MD for continued monitoring of general health and wellness  Call 911 Or go to ER if Acute Concerns (especially if any thoughts of harm to self or other)  Exercise--run 1/2 " marathon in December  Monitor BP          Shiloh Mo, PhD, MP  Advanced Practice Medical Psychologist  [facility]  [address] [facility].  [place] [address] [phone]   [phone] fax

## 2022-09-28 NOTE — PROGRESS NOTES
"Outpatient Psychiatry Follow-Up Visit     9/28/2022      Clinical Status of Patient:  Outpatient (Ambulatory)    Chief Complaint:  Danisha Jones is a 32 y.o. male who presents today for follow-up of depression and anxiety.      Impressions/Plan from last visit: Danisha attended his visit. 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 He plans to work toward certifications while there so that he can continue to pursue the outdoor/wilderness jobs. He denied having any side effects. He said that he has been socializing more--has been racing. He has been running with a group--the last race was a 10-mile trail run in [place], [place] ("[place]"). He lives with his sister--hopes to move out and get his own place. One of the people with whom he runs is a professor at [employer] and has been recruiting him for graduate school (masters degree). He is thinking about it but is not sure that he has the grades. He will further discuss this with her. Encouraged him to observe a class or two to make sure it's an area he likes (even if that professor were not involved). The travel and research--and nature--appeal to him. Because some of his PHQ items indicated lingering depressive features, we agreed to increase Zoloft and continue to monitor. Plan--increase Zoloft 75 mg.    Interval History and Content of Current Session: Danisha attended his visit. He reported having headaches after increasing Zoloft, but he thinks it was sinus related. 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 He is having to put in a lot of time outside of work hours. He is hoping to use this year as a stepping stone to get a teaching job in [place] next year. He had been talking to an ex, but that has stopped again. He had been tired but taking multi-vitamins has helped. He moved into his own apartment last week--working getting back " into working out regularly. He wants to run 1/2 marathon in December. He said that he has been taking edibles--1 a few times a week. He reported that it helps with his anxiety. Plan--continue Zoloft 100 mg.      GAD7 9/28/2022 6/28/2022 5/3/2022   1. Feeling nervous, anxious, or on edge? 2 2 1   2. Not being able to stop or control worrying? 1 2 1   3. Worrying too much about different things? 2 2 1   4. Trouble relaxing? 1 2 1   5. Being so restless that it is hard to sit still? 1 0 0   6. Becoming easily annoyed or irritable? 3 1 0   7. Feeling afraid as if something awful might happen? 0 0 1   KEN-7 Score 10 9 5      0-4 = Minimal anxiety  5-9 = Mild anxiety  10-14 = Moderate anxiety  15-21 = Severe anxiety       Depression Patient Health Questionnaire 6/28/2022 3/30/2022 3/1/2022   Over the last two weeks how often have you been bothered by little interest or pleasure in doing things Several days More than half the days Several days   Over the last two weeks how often have you been bothered by feeling down, depressed or hopeless Several days Several days Several days   PHQ-2 Total Score 2 3 2   Over the last two weeks how often have you been bothered by trouble falling or staying asleep, or sleeping too much More than half the days Several days More than half the days   Over the last two weeks how often have you been bothered by feeling tired or having little energy More than half the days More than half the days More than half the days   Over the last two weeks how often have you been bothered by a poor appetite or overeating Several days Several days Several days   Over the last two weeks how often have you been bothered by feeling bad about yourself - or that you are a failure or have let yourself or your family down Several days Several days Several days   Over the last two weeks how often have you been bothered by trouble concentrating on things, such as reading the newspaper or watching television Several  days Several days More than half the days   Over the last two weeks how often have you been bothered by moving or speaking so slowly that other people could have noticed. Or the opposite - being so fidgety or restless that you have been moving around a lot more than usual. Not at all Not at all Not at all   Over the last two weeks how often have you been bothered by thoughts that you would be better off dead, or of hurting yourself Not at all Not at all Not at all   If you checked off any problems, how difficult have these problems made it for you to do your work, take care of things at home or get along with other people? Very difficult Somewhat difficult Somewhat difficult   Total Score 9 9 10   Interpretation Mild Mild Moderate     0-4 = No intervention  5 to 9 = Mild  10 to 14 = Moderate  15 to 19 = Moderately severe  =20 = Severe    Review of Systems   PSYCHIATRIC: Pertinant items are noted in the narrative.    Past Medical, Family and Social History: The patient's past medical, family and social history have been reviewed and updated as appropriate within the electronic medical record - see encounter notes.      Current Outpatient Medications:     albuterol (PROVENTIL/VENTOLIN HFA) 90 mcg/actuation inhaler, Inhale 2 puffs into the lungs every 4 (four) hours as needed for Wheezing or Shortness of Breath. Rescue, Disp: 18 g, Rfl: 11    sertraline (ZOLOFT) 100 MG tablet, Take 1 tablet (100 mg total) by mouth once daily., Disp: 30 tablet, Rfl: 2    Compliance: yes    Side effects: None    Risk Parameters:  Patient reports no suicidal ideation  Patient reports no homicidal ideation  Patient reports no self-injurious behavior  Patient reports no violent behavior    Exam (detailed: at least 9 elements; comprehensive: all 15 elements)   Constitutional  Vitals:  Most recent vital signs were reviewed.   Last 3 sets of Vitals    Vitals - 1 value per visit 9/13/2022 9/13/2022 9/28/2022   SYSTOLIC - - 145   DIASTOLIC - -  "83   Pulse 65 - 60   Temp - - -   Resp 17 - -   SPO2 97 - -   Weight (lb) 203.93 203.93 -   Weight (kg) 92.5 92.5 -   Height 72 72.441 -   BMI (Calculated) 27.7 27.3 -   VISIT REPORT - - -   Pain Score  - - -          General:  age appropriate, well dressed, neatly groomed, wearing glasses, wearing mask     Musculoskeletal  Muscle Strength/Tone:  no tremor, no tic   Gait & Station:  non-ataxic     Psychiatric  Speech:  no latency; no press   Behavior: wnl   Mood & Affect:  "It's calm I guess, relaxed"  congruent and appropriate   Thought Process:  normal and logical   Associations:  intact   Thought Content:  normal, no suicidality, no homicidality, delusions, or paranoia   Insight:  has awareness of illness   Judgement: behavior is adequate to circumstances   Orientation:  grossly intact   Memory: intact for content of interview   Language: grossly intact   Attention Span & Concentration:  Grossly intact   Fund of Knowledge:  intact and appropriate to age and level of education     Assessment and Diagnosis   Status/Progress: Based on the examination today, the patient's problem(s) is/are improved and adequately but not ideally controlled.  New problems have not been presented today.   Co-morbidities are not complicating management of the primary condition.  There are no active rule-out diagnoses for this patient at this time.     General Impression:     Encounter Diagnoses   Name Primary?    Anxiety Yes    Current mild episode of major depressive disorder without prior episode          Intervention/Counseling/Treatment Plan   Medication Management: Discussed risks, benefits, and alternatives to treatment plan documented above with patient. I answered all patient questions related to this plan, and patient expressed understanding and agreement.   continue Zoloft 100 mg  Counseling if needed  Exercise--wants to run 1/2 marathon in December  Monitor BP    Return to Clinic: 3 months    Medication List with " Changes/Refills   Current Medications    ALBUTEROL (PROVENTIL/VENTOLIN HFA) 90 MCG/ACTUATION INHALER    Inhale 2 puffs into the lungs every 4 (four) hours as needed for Wheezing or Shortness of Breath. Rescue   Changed and/or Refilled Medications    Modified Medication Previous Medication    SERTRALINE (ZOLOFT) 100 MG TABLET sertraline (ZOLOFT) 100 MG tablet       Take 1 tablet (100 mg total) by mouth once daily.    Take 1 tablet (100 mg total) by mouth once daily.        Time spent with pt including note preparation: 20 minutes     Shiloh Mo, PhD, MP  Advanced Practice Medical Psychologist  [facility]  [address] [facility].  [place] [address]  [phone]   [phone] fax

## 2022-10-11 ENCOUNTER — PATIENT MESSAGE (OUTPATIENT)
Dept: PSYCHIATRY | Facility: CLINIC | Age: 32
End: 2022-10-11
Payer: MEDICAID

## 2022-10-18 ENCOUNTER — PATIENT MESSAGE (OUTPATIENT)
Dept: PSYCHIATRY | Facility: CLINIC | Age: 32
End: 2022-10-18
Payer: MEDICAID

## 2022-10-18 ENCOUNTER — TELEPHONE (OUTPATIENT)
Dept: PSYCHIATRY | Facility: CLINIC | Age: 32
End: 2022-10-18
Payer: MEDICAID

## 2022-12-28 ENCOUNTER — E-VISIT (OUTPATIENT)
Dept: INTERNAL MEDICINE | Facility: CLINIC | Age: 32
End: 2022-12-28
Payer: MEDICAID

## 2022-12-28 DIAGNOSIS — L73.9 FOLLICULITIS: Primary | ICD-10-CM

## 2022-12-28 PROCEDURE — 99499 NO LOS: ICD-10-PCS | Mod: ,,, | Performed by: FAMILY MEDICINE

## 2022-12-28 PROCEDURE — 99499 UNLISTED E&M SERVICE: CPT | Mod: ,,, | Performed by: FAMILY MEDICINE

## 2022-12-30 RX ORDER — MUPIROCIN 20 MG/G
OINTMENT TOPICAL 3 TIMES DAILY
Qty: 30 G | Refills: 0 | Status: SHIPPED | OUTPATIENT
Start: 2022-12-30

## 2022-12-30 NOTE — PROGRESS NOTES
Subjective:       Patient ID: Danisha Jones is a 32 y.o. male.    Chief Complaint: No chief complaint on file.    HPI    Patient Active Problem List   Diagnosis    Herpes simplex virus type 1 (HSV-1) dermatitis    Depression with anxiety    Asthma, exercise induced       Past Medical History:   Diagnosis Date    Anemia     Herpes simplex virus type 1 (HSV-1) dermatitis        No past surgical history on file.    Family History   Problem Relation Age of Onset    Anemia Mother     Asthma Mother     Diabetes Father     Hypertension Father     Anemia Sister     No Known Problems Brother     No Known Problems Sister        Social History     Tobacco Use   Smoking Status Never   Smokeless Tobacco Never       Wt Readings from Last 5 Encounters:   09/13/22 92.5 kg (203 lb 14.8 oz)   09/13/22 92.5 kg (203 lb 14.8 oz)   07/26/22 90.3 kg (199 lb 1.2 oz)   07/15/22 90.5 kg (199 lb 8.3 oz)   07/01/22 90.8 kg (200 lb 2.8 oz)       For further HPI details, see assessment and plan.    Review of Systems    Objective:      There were no vitals filed for this visit.    Physical Exam    Assessment:       1. Folliculitis          Plan:   Folliculitis    Other orders  -     mupirocin (BACTROBAN) 2 % ointment; Apply topically 3 (three) times daily.  Dispense: 30 g; Refill: 0      Based off photo/ descriptpin appears folliculitis. Sent bactroban and patient a message.    This note was verbally dictated, please excuse any type errors.

## 2023-01-10 ENCOUNTER — TELEPHONE (OUTPATIENT)
Dept: PSYCHIATRY | Facility: CLINIC | Age: 33
End: 2023-01-10
Payer: MEDICAID

## 2023-01-10 NOTE — TELEPHONE ENCOUNTER
Called to check on him since missing visit--got his voicemail; left a voicemail to log in if available now; otherwise call clinic to reschedule

## 2023-01-11 ENCOUNTER — PATIENT MESSAGE (OUTPATIENT)
Dept: INTERNAL MEDICINE | Facility: CLINIC | Age: 33
End: 2023-01-11
Payer: MEDICAID

## 2023-01-18 ENCOUNTER — OFFICE VISIT (OUTPATIENT)
Dept: INTERNAL MEDICINE | Facility: CLINIC | Age: 33
End: 2023-01-18
Payer: MEDICAID

## 2023-01-18 VITALS
SYSTOLIC BLOOD PRESSURE: 134 MMHG | HEART RATE: 90 BPM | HEIGHT: 72 IN | DIASTOLIC BLOOD PRESSURE: 70 MMHG | OXYGEN SATURATION: 99 % | BODY MASS INDEX: 27.14 KG/M2 | TEMPERATURE: 99 F | WEIGHT: 200.38 LBS

## 2023-01-18 DIAGNOSIS — R42 LIGHTHEADEDNESS: ICD-10-CM

## 2023-01-18 DIAGNOSIS — R03.0 ELEVATED BLOOD PRESSURE, SITUATIONAL: Primary | ICD-10-CM

## 2023-01-18 DIAGNOSIS — R07.89 CHEST DISCOMFORT: ICD-10-CM

## 2023-01-18 PROCEDURE — 3008F PR BODY MASS INDEX (BMI) DOCUMENTED: ICD-10-PCS | Mod: CPTII,,, | Performed by: PHYSICIAN ASSISTANT

## 2023-01-18 PROCEDURE — 99213 OFFICE O/P EST LOW 20 MIN: CPT | Mod: PBBFAC | Performed by: PHYSICIAN ASSISTANT

## 2023-01-18 PROCEDURE — 3008F BODY MASS INDEX DOCD: CPT | Mod: CPTII,,, | Performed by: PHYSICIAN ASSISTANT

## 2023-01-18 PROCEDURE — 99213 PR OFFICE/OUTPT VISIT, EST, LEVL III, 20-29 MIN: ICD-10-PCS | Mod: S$PBB,,, | Performed by: PHYSICIAN ASSISTANT

## 2023-01-18 PROCEDURE — 99999 PR PBB SHADOW E&M-EST. PATIENT-LVL III: ICD-10-PCS | Mod: PBBFAC,,, | Performed by: PHYSICIAN ASSISTANT

## 2023-01-18 PROCEDURE — 3075F SYST BP GE 130 - 139MM HG: CPT | Mod: CPTII,,, | Performed by: PHYSICIAN ASSISTANT

## 2023-01-18 PROCEDURE — 1159F PR MEDICATION LIST DOCUMENTED IN MEDICAL RECORD: ICD-10-PCS | Mod: CPTII,,, | Performed by: PHYSICIAN ASSISTANT

## 2023-01-18 PROCEDURE — 1159F MED LIST DOCD IN RCRD: CPT | Mod: CPTII,,, | Performed by: PHYSICIAN ASSISTANT

## 2023-01-18 PROCEDURE — 1160F PR REVIEW ALL MEDS BY PRESCRIBER/CLIN PHARMACIST DOCUMENTED: ICD-10-PCS | Mod: CPTII,,, | Performed by: PHYSICIAN ASSISTANT

## 2023-01-18 PROCEDURE — 1160F RVW MEDS BY RX/DR IN RCRD: CPT | Mod: CPTII,,, | Performed by: PHYSICIAN ASSISTANT

## 2023-01-18 PROCEDURE — 3078F PR MOST RECENT DIASTOLIC BLOOD PRESSURE < 80 MM HG: ICD-10-PCS | Mod: CPTII,,, | Performed by: PHYSICIAN ASSISTANT

## 2023-01-18 PROCEDURE — 99999 PR PBB SHADOW E&M-EST. PATIENT-LVL III: CPT | Mod: PBBFAC,,, | Performed by: PHYSICIAN ASSISTANT

## 2023-01-18 PROCEDURE — 3078F DIAST BP <80 MM HG: CPT | Mod: CPTII,,, | Performed by: PHYSICIAN ASSISTANT

## 2023-01-18 PROCEDURE — 99213 OFFICE O/P EST LOW 20 MIN: CPT | Mod: S$PBB,,, | Performed by: PHYSICIAN ASSISTANT

## 2023-01-18 PROCEDURE — 3075F PR MOST RECENT SYSTOLIC BLOOD PRESS GE 130-139MM HG: ICD-10-PCS | Mod: CPTII,,, | Performed by: PHYSICIAN ASSISTANT

## 2023-01-18 NOTE — PROGRESS NOTES
Subjective:       Patient ID: Danisha Jones is a 32 y.o. male.    Chief Complaint: Hypertension      Hypertension  Pertinent negatives include no headaches or shortness of breath.     Danisha Jones is a 32 y.o. male who presents today with complaints of Hypertension  Patient presents with c/o elevated home BP reading yesterday of 170s/90s. Reading this morning was 130s/80s. Reports this morning had lightheaded sensation for about 5 seconds with blurred vision while he was working out. Also reports intermittent upper left chest discomfort that is aching. Denies difficulty breathing. Reports did not sleep well last night. No treatments tried.     Review of Systems   Constitutional:  Negative for chills and fever.   Respiratory:  Negative for shortness of breath.    Musculoskeletal:  Positive for myalgias.   Neurological:  Positive for light-headedness. Negative for dizziness and headaches.     Objective:      Physical Exam  Vitals and nursing note reviewed.   Constitutional:       General: He is not in acute distress.     Appearance: He is well-developed.   HENT:      Head: Normocephalic and atraumatic.   Eyes:      General: Lids are normal. No scleral icterus.     Extraocular Movements: Extraocular movements intact.      Conjunctiva/sclera: Conjunctivae normal.   Cardiovascular:      Rate and Rhythm: Normal rate and regular rhythm.   Pulmonary:      Effort: Pulmonary effort is normal.      Breath sounds: Normal breath sounds. No decreased breath sounds, wheezing, rhonchi or rales.   Neurological:      Mental Status: He is alert.      Cranial Nerves: No cranial nerve deficit.   Psychiatric:         Mood and Affect: Mood and affect normal.       Assessment:       1. Elevated blood pressure, situational    2. Lightheadedness    3. Chest discomfort          Plan:   1. Elevated blood pressure, situational    2. Lightheadedness    3. Chest discomfort      /70, normal  Recommend NV this week to calibrate home  machine, scheduled Friday  Increase fluid intake, rest

## 2023-02-15 DIAGNOSIS — R03.0 ELEVATED BLOOD PRESSURE, SITUATIONAL: ICD-10-CM

## 2023-02-22 ENCOUNTER — TELEPHONE (OUTPATIENT)
Dept: CARDIOLOGY | Facility: CLINIC | Age: 33
End: 2023-02-22
Payer: MEDICAID

## 2023-02-27 DIAGNOSIS — Z76.89 ENCOUNTER TO ESTABLISH CARE: ICD-10-CM

## 2023-02-27 DIAGNOSIS — Z00.00 ROUTINE HEALTH MAINTENANCE: Primary | ICD-10-CM

## 2023-03-05 ENCOUNTER — PATIENT MESSAGE (OUTPATIENT)
Dept: INTERNAL MEDICINE | Facility: CLINIC | Age: 33
End: 2023-03-05
Payer: MEDICAID

## 2023-03-09 ENCOUNTER — OFFICE VISIT (OUTPATIENT)
Dept: INTERNAL MEDICINE | Facility: CLINIC | Age: 33
End: 2023-03-09
Payer: MEDICAID

## 2023-03-09 DIAGNOSIS — R61 EXCESSIVE SWEATING: Primary | ICD-10-CM

## 2023-03-09 PROCEDURE — 99213 OFFICE O/P EST LOW 20 MIN: CPT | Mod: 95,,, | Performed by: PHYSICIAN ASSISTANT

## 2023-03-09 PROCEDURE — 1160F PR REVIEW ALL MEDS BY PRESCRIBER/CLIN PHARMACIST DOCUMENTED: ICD-10-PCS | Mod: CPTII,95,, | Performed by: PHYSICIAN ASSISTANT

## 2023-03-09 PROCEDURE — 99213 PR OFFICE/OUTPT VISIT, EST, LEVL III, 20-29 MIN: ICD-10-PCS | Mod: 95,,, | Performed by: PHYSICIAN ASSISTANT

## 2023-03-09 PROCEDURE — 1160F RVW MEDS BY RX/DR IN RCRD: CPT | Mod: CPTII,95,, | Performed by: PHYSICIAN ASSISTANT

## 2023-03-09 PROCEDURE — 1159F PR MEDICATION LIST DOCUMENTED IN MEDICAL RECORD: ICD-10-PCS | Mod: CPTII,95,, | Performed by: PHYSICIAN ASSISTANT

## 2023-03-09 PROCEDURE — 1159F MED LIST DOCD IN RCRD: CPT | Mod: CPTII,95,, | Performed by: PHYSICIAN ASSISTANT

## 2023-03-09 NOTE — PROGRESS NOTES
Subjective:       Patient ID: Danisha Jones is a 32 y.o. male.    Chief Complaint: Excessive Sweating      The patient location is: [place]  The chief complaint leading to consultation is: sweating    Visit type: audiovisual    Face to Face time with patient: 12 minutes (chart review started 1644; video started 1644; video ended 1656)  15 minutes of total time spent on the encounter, which includes face to face time and non-face to face time preparing to see the patient (eg, review of tests), Obtaining and/or reviewing separately obtained history, Documenting clinical information in the electronic or other health record, Independently interpreting results (not separately reported) and communicating results to the patient/family/caregiver, or Care coordination (not separately reported).     Each patient to whom he or she provides medical services by telemedicine is:  (1) informed of the relationship between the physician and patient and the respective role of any other health care provider with respect to management of the patient; and (2) notified that he or she may decline to receive medical services by telemedicine and may withdraw from such care at any time.    Reports chronic excessive sweating for over a year, daily, entire body including palms of hands, worse with exercising. Pt reports diarrhea over the last 3-4 weeks, no constipation, nausea or vomiting. Denies fasting. Taking salt/electrolytes tablets when he runs and BCAAs. He works out for about 30-45 minutes but currently he is training for a marathon so he is exercising for 2-3 hours.     Review of Systems   Constitutional:  Positive for activity change. Negative for unexpected weight change.   HENT:  Negative for hearing loss, rhinorrhea and trouble swallowing.    Eyes:  Negative for discharge and visual disturbance.   Respiratory:  Negative for chest tightness and wheezing.    Cardiovascular:  Negative for chest pain and palpitations.    Gastrointestinal:  Positive for diarrhea. Negative for blood in stool, constipation and vomiting.   Endocrine: Negative for polydipsia and polyuria.   Genitourinary:  Negative for difficulty urinating, hematuria and urgency.   Musculoskeletal:  Positive for arthralgias. Negative for joint swelling and neck pain.   Neurological:  Positive for headaches. Negative for weakness.   Psychiatric/Behavioral:  Positive for dysphoric mood. Negative for confusion.        Objective:        Wt Readings from Last 3 Encounters:   01/18/23 90.9 kg (200 lb 6.4 oz)   09/13/22 92.5 kg (203 lb 14.8 oz)   09/13/22 92.5 kg (203 lb 14.8 oz)     Temp Readings from Last 3 Encounters:   01/18/23 98.6 °F (37 °C) (Tympanic)   07/26/22 98.8 °F (37.1 °C)   03/14/22 98.1 °F (36.7 °C) (Tympanic)     BP Readings from Last 3 Encounters:   01/18/23 134/70   07/26/22 (!) 142/78   07/15/22 128/84     Pulse Readings from Last 3 Encounters:   01/18/23 90   09/13/22 65   07/26/22 88     There is no height or weight on file to calculate BMI.    Physical Exam  Constitutional:       General: He is not in acute distress.     Appearance: He is well-developed.   HENT:      Head: Normocephalic and atraumatic.   Eyes:      General: Lids are normal. No scleral icterus.     Extraocular Movements: Extraocular movements intact.      Conjunctiva/sclera: Conjunctivae normal.   Pulmonary:      Effort: Pulmonary effort is normal.   Neurological:      Mental Status: He is alert.   Psychiatric:         Mood and Affect: Mood and affect normal.       Assessment:       1. Excessive sweating        Plan:   1. Excessive sweating      Encouraged hydration, recommend against prolonged exercising.   ED precautions.

## 2023-03-13 ENCOUNTER — PATIENT MESSAGE (OUTPATIENT)
Dept: INTERNAL MEDICINE | Facility: CLINIC | Age: 33
End: 2023-03-13
Payer: MEDICAID

## 2023-03-17 ENCOUNTER — CLINICAL SUPPORT (OUTPATIENT)
Dept: PULMONOLOGY | Facility: CLINIC | Age: 33
End: 2023-03-17
Payer: MEDICAID

## 2023-03-17 ENCOUNTER — OFFICE VISIT (OUTPATIENT)
Dept: PULMONOLOGY | Facility: CLINIC | Age: 33
End: 2023-03-17
Payer: MEDICAID

## 2023-03-17 VITALS
DIASTOLIC BLOOD PRESSURE: 76 MMHG | HEIGHT: 72 IN | WEIGHT: 207.88 LBS | SYSTOLIC BLOOD PRESSURE: 128 MMHG | HEART RATE: 64 BPM | BODY MASS INDEX: 28.16 KG/M2 | OXYGEN SATURATION: 98 % | RESPIRATION RATE: 21 BRPM

## 2023-03-17 DIAGNOSIS — J45.990 ASTHMA, EXERCISE INDUCED: ICD-10-CM

## 2023-03-17 DIAGNOSIS — J45.990 ASTHMA, EXERCISE INDUCED: Primary | ICD-10-CM

## 2023-03-17 DIAGNOSIS — R06.02 SOB (SHORTNESS OF BREATH) ON EXERTION: ICD-10-CM

## 2023-03-17 DIAGNOSIS — F41.8 DEPRESSION WITH ANXIETY: ICD-10-CM

## 2023-03-17 LAB
BRPFT: NORMAL
FEF 25 75 CHG: 32.3 %
FEF 25 75 LLN: 2.33
FEF 25 75 POST REF: 104 %
FEF 25 75 PRE REF: 78.6 %
FEF 25 75 REF: 4.15
FET100 CHG: -23.7 %
FEV1 CHG: 8.8 %
FEV1 FVC CHG: 7.7 %
FEV1 FVC LLN: 72
FEV1 FVC POST REF: 97.5 %
FEV1 FVC PRE REF: 90.5 %
FEV1 FVC REF: 83
FEV1 LLN: 3.15
FEV1 POST REF: 108.8 %
FEV1 PRE REF: 99.9 %
FEV1 REF: 4.07
FVC CHG: 1 %
FVC LLN: 3.89
FVC POST REF: 111.1 %
FVC PRE REF: 110 %
FVC REF: 4.93
PEF CHG: 3.8 %
PEF LLN: 7.34
PEF POST REF: 94.4 %
PEF PRE REF: 90.9 %
PEF REF: 10.15
POST FEF 25 75: 4.32 L/S (ref 2.33–5.97)
POST FET 100: 7.63 SEC
POST FEV1 FVC: 80.86 % (ref 72.47–93.44)
POST FEV1: 4.43 L (ref 3.15–4.99)
POST FVC: 5.47 L (ref 3.89–5.97)
POST PEF: 9.59 L/S (ref 7.34–12.97)
PRE FEF 25 75: 3.26 L/S (ref 2.33–5.97)
PRE FET 100: 10 SEC
PRE FEV1 FVC: 75.07 % (ref 72.47–93.44)
PRE FEV1: 4.07 L (ref 3.15–4.99)
PRE FVC: 5.42 L (ref 3.89–5.97)
PRE PEF: 9.23 L/S (ref 7.34–12.97)

## 2023-03-17 PROCEDURE — 1160F RVW MEDS BY RX/DR IN RCRD: CPT | Mod: CPTII,,, | Performed by: NURSE PRACTITIONER

## 2023-03-17 PROCEDURE — 99999 PR PBB SHADOW E&M-EST. PATIENT-LVL III: ICD-10-PCS | Mod: PBBFAC,,, | Performed by: NURSE PRACTITIONER

## 2023-03-17 PROCEDURE — 94060 PR EVAL OF BRONCHOSPASM: ICD-10-PCS | Mod: 26,S$PBB,, | Performed by: INTERNAL MEDICINE

## 2023-03-17 PROCEDURE — 1160F PR REVIEW ALL MEDS BY PRESCRIBER/CLIN PHARMACIST DOCUMENTED: ICD-10-PCS | Mod: CPTII,,, | Performed by: NURSE PRACTITIONER

## 2023-03-17 PROCEDURE — 1159F MED LIST DOCD IN RCRD: CPT | Mod: CPTII,,, | Performed by: NURSE PRACTITIONER

## 2023-03-17 PROCEDURE — 3078F PR MOST RECENT DIASTOLIC BLOOD PRESSURE < 80 MM HG: ICD-10-PCS | Mod: CPTII,,, | Performed by: NURSE PRACTITIONER

## 2023-03-17 PROCEDURE — 95012 NITRIC OXIDE EXP GAS DETER: CPT | Mod: PBBFAC

## 2023-03-17 PROCEDURE — 3008F BODY MASS INDEX DOCD: CPT | Mod: CPTII,,, | Performed by: NURSE PRACTITIONER

## 2023-03-17 PROCEDURE — 99213 OFFICE O/P EST LOW 20 MIN: CPT | Mod: PBBFAC,25 | Performed by: NURSE PRACTITIONER

## 2023-03-17 PROCEDURE — 3074F SYST BP LT 130 MM HG: CPT | Mod: CPTII,,, | Performed by: NURSE PRACTITIONER

## 2023-03-17 PROCEDURE — 94060 EVALUATION OF WHEEZING: CPT | Mod: PBBFAC

## 2023-03-17 PROCEDURE — 1159F PR MEDICATION LIST DOCUMENTED IN MEDICAL RECORD: ICD-10-PCS | Mod: CPTII,,, | Performed by: NURSE PRACTITIONER

## 2023-03-17 PROCEDURE — 3078F DIAST BP <80 MM HG: CPT | Mod: CPTII,,, | Performed by: NURSE PRACTITIONER

## 2023-03-17 PROCEDURE — 94060 EVALUATION OF WHEEZING: CPT | Mod: 26,S$PBB,, | Performed by: INTERNAL MEDICINE

## 2023-03-17 PROCEDURE — 99214 OFFICE O/P EST MOD 30 MIN: CPT | Mod: 25,S$PBB,, | Performed by: NURSE PRACTITIONER

## 2023-03-17 PROCEDURE — 3074F PR MOST RECENT SYSTOLIC BLOOD PRESSURE < 130 MM HG: ICD-10-PCS | Mod: CPTII,,, | Performed by: NURSE PRACTITIONER

## 2023-03-17 PROCEDURE — 3008F PR BODY MASS INDEX (BMI) DOCUMENTED: ICD-10-PCS | Mod: CPTII,,, | Performed by: NURSE PRACTITIONER

## 2023-03-17 PROCEDURE — 99999 PR PBB SHADOW E&M-EST. PATIENT-LVL III: CPT | Mod: PBBFAC,,, | Performed by: NURSE PRACTITIONER

## 2023-03-17 PROCEDURE — 99214 PR OFFICE/OUTPT VISIT, EST, LEVL IV, 30-39 MIN: ICD-10-PCS | Mod: 25,S$PBB,, | Performed by: NURSE PRACTITIONER

## 2023-03-17 NOTE — ASSESSMENT & PLAN NOTE
New onset onset age 32  Controlled, continue Albuterol inhaler before exercise.    3/17/2023 Spirometry is normal. FEV1 99.9%. 3/17/2023 Spirometry normal air flow. FEV1 99.9%. Airflow not clearly improved after bronchodilator.   3/17/2023 FeNO 25, no inflammation of lungs suggested.    9/13/2022 CPFT normal spirometry FEV1 90.0%. Positive bronchodilator response suggestive of asthma FEV1 108.5%. Normal lung volumes. Normal DLCO.   9/13/2022 FeNO 24, no inflammation of lungs suggested    Follow up 1 year review spirometry/feno/cxr

## 2023-03-17 NOTE — PROCEDURES
Clinical Guide to Interpretation or FeNO Levels :    FeNO  (ppb) LOW INTERMEDIATE HIGH   ADULT VALUES < 25 25-50          > 50   Th2-driven Inflammation Unlikely Likely Significant     Patients FeNO level at this visit : _25___ (ppb)    Interpretation of FeNO measurement in adults:  [] FENO is less than 25 ppb implies non eosinophilic airway inflammation or the absence of airway inflammation.    Comment: Low FENO (<25 ppb) in adult asthmatics with persistent symptoms suggests other etiologies for these symptoms and a lower likelihood of benefit from adding or increasing inhaled glucocorticoids.    [] FENO between 25 and 50 ppb in adults should be interpreted cautiously with reference to the clinical situation (eg, symptomatic, on or off therapy, current smoking).    [] FENO greater than 50 ppb in adults  suggests eosinophilic airway inflammation   Comment: High FENO (>50 ppb) in adult asthmatics even with atypical symptoms suggests glucocorticoid responsiveness. High FENO (>50 ppb) can help identify poor adherence or uncontrolled inflammation in asthma patients with otherwise seemingly controlled asthma.    Discussion:  A FENO less than 25 ppb in adults and less than 20 ppb in children younger than 12 years of age implies noneosinophilic airway inflammation or the absence of airway inflammation.  A FENO greater than 50 ppb in adults or greater than 35 ppb in children suggests eosinophilic airway inflammation.   Values of FENO between 25 and 50 ppb in adults (20 to 35 ppb in children) should be interpreted cautiously with reference to the clinical situation (eg, symptomatic, on or off therapy, current smoking).  A rising FENO with a greater than 20 percent change and more than 25 ppb (20 ppb in children) from a previously stable level suggests increasing eosinophilic airway inflammation, but there are wide inter-individual differences.  A decrease in FENO greater than 20 percent for values over 50 ppb or more than  10 ppb for values less than 50 ppb may be clinically important.  ?FENO in other respiratory diseases - Several other diseases are associated with altered levels of exhaled NO: low levels of FENO have been noted in cystic fibrosis, current smoking, pulmonary hypertension, hypothermia, primary ciliary dyskinesia, and bronchopulmonary dysplasia. Elevated FENO has been noted in atopy, nonasthmatic eosinophilic bronchitis, COPD exacerbations, noncystic fibrosis bronchiectasis, and viral upper respiratory infections.    REFERENCE:  ATS Board of Directors, December 2004, and by the ERS Executive Committee, June 2004. ATS/ERS Recommendations for Standardized Procedures for the Online and Offline Measurement of Exhaled Lower Respiratory Nitric Oxide and Nasal Nitric Oxide. Guideline 2005

## 2023-03-17 NOTE — PROGRESS NOTES
Subjective:      Patient ID: Danisha Jones is a 32 y.o. male.    Patient Active Problem List   Diagnosis    Herpes simplex virus type 1 (HSV-1) dermatitis    Depression with anxiety    Asthma, exercise induced       he has been referred by  for evaluation and management for   Chief Complaint   Patient presents with    Asthma       Chief Complaint: Asthma        HPI:  Danisha Jones is a 32 y.o. male presents to pulmonary clinic follow up evaluation related to shortness of breath, exercise induced asthma with review spirometry/feno.    9/13/2022 new diagnosis exercise induced asthma.     Presents stable. Shortness of breath is resolved with running. Use of Albuterol inhaler before strenuous exercise only.     3/17/2023 FeNO 25  3/17/2023 Spirometry normal air flow. FEV1 99.9%    9/13/2022 CPFT normal spirometry FEV1 90.0%. Positive bronchodilator response suggestive of asthma FEV1 108.5%. Normal lung volumes. Normal DLCO.     7/15/2022 pulmonary office note  Patient is a marathon runner and noticed significant shortness of breath with easy runs and going up 2 flights of stairs over the past about 1 year. He noticed increased heart rates from 160's with runs to 170's-180's, which is still occurring. Yesterday's run Ht rate up to 185 so stopped and walked to reduce heart rate, then continued run. He wears a heart rate monitor around in chest that links to his [employer] watch.    He is able to continue run about 3 - 5 miles despite increased heart rates and shortness of breath.   The shortness of breath does not continue the whole run, shortness of breath is while running noticed when elevated heart rate in 180's.   He takes stairs when going into buildings instead of taking elevator and over past year notices shortness of breath with climbing 2 or more flights.  Other symptom of nasal congestion and having to breath through mouth while running.     There is no cough, no wheezing, no hemoptysis, no sputum  production, no weight loss, noted TB exposure, no asbestos exposure, no chest pain when shortness of breath occurs.  He has complaint of chest pain that last occurred few nights ago when laying on left side, burning type sensation under left pectoral muscle region. He changed sleeping position and burning pain resolved.   Never diagnosis with asthma. Never wheezing or cough.     Mom has asthma.     No inhalers have been tried for exertional shortness of breath.     He has had cardiac work up for chest pain and increased heart rate concerns with running.   Neg stress and ECHO 4/2022  ECHO 4/2022 with normal bi V function,   ECG stress test neg for ischemia, HTN response noted. BP peak 215/73.   Holter with rare ectopy, AVG HR 60 bpm.    He had covid-19 in Jan 2022. He had symptoms of sore throat, body aches, rhinorrhea, dry cough lasted about 4-5 days. He feels well recovered from Covid, no residual effects. He does state he wanted chest xray today to make sure no problems from covid.     He states he has anxiety with depression.     7/15/2022 chest xray is normal. Lungs clear.     Dyspnea Characteristics:   Exertional Dyspnea   Dyspnea Duration:  Chronic over the past 1 year.   Dyspnea Severity:  ARASH 3  Dyspnea Timing:  running when heart is 180. climbing 2 or more flights of stairs  Dyspnea Contributing Factors:  anxiety    Dyspnea Associated Symptoms:  tachycardia  . No wheezing. No cough. No chest pain associated.      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 No occupational exposure to Asbestos, Silica,  Petrochemicals,  Fiber glass,  Saw Dust,  Grain Dust and  Pesticides. He would like to work as an outdoor teacher. Teaching kids about out door environments.     Avocational Exposure:   None currently.       Pet Exposures:  Dogs one inside dog. Denies allergy to Dog and  cat dander.      Previous Report Reviewed: lab reports, office notes, radiology reports and cardiology  reports     Past Medical History: The following portions of the patient's history were reviewed and updated as appropriate:   He  has no past surgical history on file.  His family history includes Anemia in his mother and sister; Asthma in his mother; Diabetes in his father; Hypertension in his father; No Known Problems in his brother and sister.  He  reports that he has never smoked. He has never used smokeless tobacco. He reports that he does not currently use alcohol. He reports that he does not use drugs.  He has a current medication list which includes the following prescription(s): albuterol, mupirocin, and sertraline.  He has No Known Allergies..    Review of Systems   Constitutional:  Negative for fever, chills, weight loss, weight gain, activity change, appetite change, fatigue and night sweats.   HENT:  Negative for postnasal drip, rhinorrhea, sinus pressure, voice change and congestion.    Eyes:  Negative for redness and itching.   Respiratory:  Negative for snoring, cough, sputum production, chest tightness, shortness of breath (resolved with use of albuterol inhaler before running), wheezing, orthopnea, asthma nighttime symptoms, dyspnea on extertion, use of rescue inhaler and somnolence.    Cardiovascular: Negative.  Negative for chest pain, palpitations and leg swelling.   Genitourinary:  Negative for difficulty urinating and hematuria.   Endocrine:  Negative for cold intolerance and heat intolerance.    Musculoskeletal:  Negative for arthralgias, gait problem, joint swelling and myalgias.   Skin: Negative.    Gastrointestinal:  Negative for nausea, vomiting, abdominal pain and acid reflux.   Neurological:  Negative for dizziness, weakness, light-headedness and headaches.   Hematological:  Negative for adenopathy. No excessive bruising.   Psychiatric/Behavioral:  The patient is nervous/anxious.    All other systems reviewed and are negative.     Objective:   /76   Pulse 64   Resp (!) 21   Ht  6' (1.829 m)   Wt 94.3 kg (207 lb 14.3 oz)   SpO2 98%   BMI 28.20 kg/m²   Physical Exam  Vitals and nursing note reviewed.   Constitutional:       General: He is not in acute distress.     Appearance: He is well-developed. He is not ill-appearing or toxic-appearing.   HENT:      Head: Normocephalic and atraumatic.      Right Ear: External ear normal.      Left Ear: External ear normal.      Nose: Nose normal.      Mouth/Throat:      Pharynx: No oropharyngeal exudate.   Eyes:      Conjunctiva/sclera: Conjunctivae normal.   Cardiovascular:      Rate and Rhythm: Normal rate and regular rhythm.      Heart sounds: Normal heart sounds.   Pulmonary:      Effort: Pulmonary effort is normal.      Breath sounds: Normal breath sounds.   Abdominal:      Palpations: Abdomen is soft.   Musculoskeletal:      Cervical back: Normal range of motion and neck supple.   Skin:     General: Skin is warm and dry.   Neurological:      Mental Status: He is alert and oriented to person, place, and time.   Psychiatric:         Behavior: Behavior normal. Behavior is cooperative.         Thought Content: Thought content normal.         Judgment: Judgment normal.     Personal Diagnostic Review    3/17/2023 FeNO 25  Clinical Guide to Interpretation or FeNO Levels :     FeNO  (ppb) LOW INTERMEDIATE HIGH   ADULT VALUES < 25 25-50          > 50   Th2-driven Inflammation Unlikely Likely Significant      Patients FeNO level at this visit : _25___ (ppb    3/17/2023 Spirometry normal air flow. FEV1 99.9%. Airflow not clearly improved after bronchodilator.       9/13/2022 FeNO 24   Clinical Guide to Interpretation or FeNO Levels :     FeNO  (ppb) LOW INTERMEDIATE HIGH   ADULT VALUES < 25 25-50          > 50   Th2-driven Inflammation Unlikely Likely Significant      Patients FeNO level at this visit :        24  (ppb)    Results for orders placed during the hospital encounter of 07/15/22    X-Ray Chest PA And Lateral    Narrative  EXAMINATION:  XR CHEST  PA AND LATERAL    CLINICAL HISTORY:  Shortness of breath    TECHNIQUE:  PA and lateral views of the chest were performed.    COMPARISON:  02/25/2022    FINDINGS:  The cardiac and mediastinal silhouettes appear within normal limits.   The lungs are clear bilaterally.  No acute osseous findings demonstrated.    Impression  No acute findings.      Electronically signed by: Jose L Gutierrez MD  Date:    07/15/2022  Time:    08:10    X-Ray Hip 2 or 3 views Left (with Pelvis when performed)  Narrative: EXAMINATION:  XR HIP WITH PELVIS WHEN PERFORMED, 2 OR 3 VIEWS LEFT    CLINICAL HISTORY:  Pain in left hip    TECHNIQUE:  AP view of the pelvis and frog leg lateral view of the left hip were performed.    COMPARISON:  None    FINDINGS:  No acute fracture or dislocation.  No evidence of AVN.  No change since 07/03/2019  Impression: Please see above    Electronically signed by: Geoff Ott MD  Date:    07/26/2022  Time:    16:04  X-ray Shoulder 2 or More Views Right  Narrative: EXAMINATION:  XR SHOULDER COMPLETE 2 OR MORE VIEWS RIGHT    CLINICAL HISTORY:  Pain in right shoulder    TECHNIQUE:  Two or three views of the right shoulder were performed.    COMPARISON:  None    FINDINGS:  No acute fracture or dislocation.  Visualized right lung is clear.  Soft tissues appear normal.  Impression: See above    Electronically signed by: Geoff Ott MD  Date:    07/26/2022  Time:    16:03      4/8/2022 48 hour holter  Predominant Rhythm Sinus rhythm with heart rates varying between 39 and 150 BPM with an average of 60BPM.     Monitoring started at 8:25 AM and continued for 47 hr 59 min. The average heart rate was 60 BPM. The minimum heart  rate was 39 BPM, occurring at 12:37:58 AM D1. The maximum heart rate was 150 BPM, occurring at 6:59:27 AM D2.    4/7/2022 Cardiac stress  The EKG portion of this study is negative for ischemia.    The exercise capacity was normal.    Stress ECG: There was hypertensive blood pressure response with  stress.    Assessment:     1. Asthma, exercise induced    2. SOB (shortness of breath) on exertion Well controlled   3. Depression with anxiety          Orders Placed This Encounter   Procedures    X-Ray Chest PA And Lateral     Standing Status:   Future     Standing Expiration Date:   3/17/2024     Order Specific Question:   May the Radiologist modify the order per protocol to meet the clinical needs of the patient?     Answer:   Yes     Order Specific Question:   Release to patient     Answer:   Immediate    Fraction of  Nitric Oxide     Standing Status:   Future     Standing Expiration Date:   3/17/2024     Order Specific Question:   Release to patient     Answer:   Immediate    Spirometry with/without bronchodilator     Standing Status:   Future     Standing Expiration Date:   3/17/2024     Order Specific Question:   Release to patient     Answer:   Immediate       Medication List with Changes/Refills   Current Medications    ALBUTEROL (PROVENTIL/VENTOLIN HFA) 90 MCG/ACTUATION INHALER    Inhale 2 puffs into the lungs every 4 (four) hours as needed for Wheezing or Shortness of Breath. Rescue    MUPIROCIN (BACTROBAN) 2 % OINTMENT    Apply topically 3 (three) times daily.    SERTRALINE (ZOLOFT) 100 MG TABLET    TAKE 1 TABLET(100 MG) BY MOUTH EVERY DAY     Plan:   Discussed diagnosis, its evaluation, treatment and usual course. All questions answered.  Problem List Items Addressed This Visit       Depression with anxiety    Current Assessment & Plan     Managed by psych, controlled on Zoloft          Asthma, exercise induced - Primary    Overview     New onset onset age 32  Controlled, continue Albuterol inhaler before exercise.  3/17/2023 Spirometry is normal. FEV1 99.9%. Airflow not clearly improved after bronchodilator.   3/17/2023 FeNO 25, no inflammation of lungs suggested.             Current Assessment & Plan     New onset onset age 32  Controlled, continue Albuterol inhaler before  exercise.    3/17/2023 Spirometry is normal. FEV1 99.9%. 3/17/2023 Spirometry normal air flow. FEV1 99.9%. Airflow not clearly improved after bronchodilator.   3/17/2023 FeNO 25, no inflammation of lungs suggested.    2022 CPFT normal spirometry FEV1 90.0%. Positive bronchodilator response suggestive of asthma FEV1 108.5%. Normal lung volumes. Normal DLCO.   2022 FeNO 24, no inflammation of lungs suggested    Follow up 1 year review spirometry/feno/cxr             Relevant Orders    Fraction of  Nitric Oxide    Spirometry with/without bronchodilator    X-Ray Chest PA And Lateral     Other Visit Diagnoses       SOB (shortness of breath) on exertion   (Well controlled)      resolved since use of albuterol. dx exercise induced asthma              Follow up in about 1 year (around 3/17/2024) for Asthma w/review ann/cxr/FeNO.

## 2023-04-12 DIAGNOSIS — R00.2 PALPITATIONS: Primary | ICD-10-CM

## 2023-04-19 ENCOUNTER — PATIENT MESSAGE (OUTPATIENT)
Dept: RESEARCH | Facility: HOSPITAL | Age: 33
End: 2023-04-19
Payer: MEDICAID

## 2023-06-30 ENCOUNTER — PATIENT MESSAGE (OUTPATIENT)
Dept: INTERNAL MEDICINE | Facility: CLINIC | Age: 33
End: 2023-06-30
Payer: MEDICAID

## 2023-07-06 ENCOUNTER — PATIENT MESSAGE (OUTPATIENT)
Dept: INTERNAL MEDICINE | Facility: CLINIC | Age: 33
End: 2023-07-06

## 2023-07-06 ENCOUNTER — OFFICE VISIT (OUTPATIENT)
Dept: INTERNAL MEDICINE | Facility: CLINIC | Age: 33
End: 2023-07-06
Payer: MEDICAID

## 2023-07-06 VITALS
BODY MASS INDEX: 28.29 KG/M2 | TEMPERATURE: 98 F | DIASTOLIC BLOOD PRESSURE: 82 MMHG | OXYGEN SATURATION: 99 % | SYSTOLIC BLOOD PRESSURE: 118 MMHG | HEART RATE: 70 BPM | WEIGHT: 208.56 LBS

## 2023-07-06 DIAGNOSIS — S76.012D STRAIN OF FLEXOR MUSCLE OF LEFT HIP, SUBSEQUENT ENCOUNTER: ICD-10-CM

## 2023-07-06 DIAGNOSIS — M25.552 CHRONIC HIP PAIN, LEFT: Primary | ICD-10-CM

## 2023-07-06 DIAGNOSIS — G89.29 CHRONIC HIP PAIN, LEFT: Primary | ICD-10-CM

## 2023-07-06 PROCEDURE — 99213 OFFICE O/P EST LOW 20 MIN: CPT | Mod: S$PBB,,, | Performed by: PHYSICIAN ASSISTANT

## 2023-07-06 PROCEDURE — 3074F SYST BP LT 130 MM HG: CPT | Mod: CPTII,,, | Performed by: PHYSICIAN ASSISTANT

## 2023-07-06 PROCEDURE — 99999 PR PBB SHADOW E&M-EST. PATIENT-LVL IV: ICD-10-PCS | Mod: PBBFAC,,, | Performed by: PHYSICIAN ASSISTANT

## 2023-07-06 PROCEDURE — 99213 PR OFFICE/OUTPT VISIT, EST, LEVL III, 20-29 MIN: ICD-10-PCS | Mod: S$PBB,,, | Performed by: PHYSICIAN ASSISTANT

## 2023-07-06 PROCEDURE — 3008F PR BODY MASS INDEX (BMI) DOCUMENTED: ICD-10-PCS | Mod: CPTII,,, | Performed by: PHYSICIAN ASSISTANT

## 2023-07-06 PROCEDURE — 99214 OFFICE O/P EST MOD 30 MIN: CPT | Mod: PBBFAC | Performed by: PHYSICIAN ASSISTANT

## 2023-07-06 PROCEDURE — 1159F PR MEDICATION LIST DOCUMENTED IN MEDICAL RECORD: ICD-10-PCS | Mod: CPTII,,, | Performed by: PHYSICIAN ASSISTANT

## 2023-07-06 PROCEDURE — 99999 PR PBB SHADOW E&M-EST. PATIENT-LVL IV: CPT | Mod: PBBFAC,,, | Performed by: PHYSICIAN ASSISTANT

## 2023-07-06 PROCEDURE — 3008F BODY MASS INDEX DOCD: CPT | Mod: CPTII,,, | Performed by: PHYSICIAN ASSISTANT

## 2023-07-06 PROCEDURE — 1159F MED LIST DOCD IN RCRD: CPT | Mod: CPTII,,, | Performed by: PHYSICIAN ASSISTANT

## 2023-07-06 PROCEDURE — 3079F PR MOST RECENT DIASTOLIC BLOOD PRESSURE 80-89 MM HG: ICD-10-PCS | Mod: CPTII,,, | Performed by: PHYSICIAN ASSISTANT

## 2023-07-06 PROCEDURE — 1160F PR REVIEW ALL MEDS BY PRESCRIBER/CLIN PHARMACIST DOCUMENTED: ICD-10-PCS | Mod: CPTII,,, | Performed by: PHYSICIAN ASSISTANT

## 2023-07-06 PROCEDURE — 3074F PR MOST RECENT SYSTOLIC BLOOD PRESSURE < 130 MM HG: ICD-10-PCS | Mod: CPTII,,, | Performed by: PHYSICIAN ASSISTANT

## 2023-07-06 PROCEDURE — 1160F RVW MEDS BY RX/DR IN RCRD: CPT | Mod: CPTII,,, | Performed by: PHYSICIAN ASSISTANT

## 2023-07-06 PROCEDURE — 3079F DIAST BP 80-89 MM HG: CPT | Mod: CPTII,,, | Performed by: PHYSICIAN ASSISTANT

## 2023-07-06 RX ORDER — NAPROXEN 500 MG/1
500 TABLET ORAL 2 TIMES DAILY WITH MEALS
Qty: 30 TABLET | Refills: 0 | Status: SHIPPED | OUTPATIENT
Start: 2023-07-06

## 2023-07-07 NOTE — PROGRESS NOTES
Subjective:       Patient ID: Danisha Jones is a 32 y.o. male.    Chief Complaint: Hip Pain (Patient stated that it has been going on for months now. )      Hip Pain   Pertinent negatives include no numbness.   32-year-old male presents with acute on chronic left anterior hip flexor pain that has recently started occurring since he began running last month.  Patient runs 3-4 days a week for 2-4 miles per run and frequently trains for marathons.  Pain is aching, nonradiating and worse with hip flexion.  He has tried no treatments.  He denies any paresthesias or weakness.    Review of Systems   Constitutional:  Negative for chills and fever.   Musculoskeletal:  Positive for arthralgias and myalgias. Negative for back pain.   Neurological:  Negative for weakness and numbness.     Objective:      Physical Exam  Vitals and nursing note reviewed.   Constitutional:       General: He is not in acute distress.     Appearance: He is well-developed.   HENT:      Head: Normocephalic and atraumatic.   Eyes:      General: Lids are normal. No scleral icterus.     Extraocular Movements: Extraocular movements intact.      Conjunctiva/sclera: Conjunctivae normal.   Cardiovascular:      Pulses:           Dorsalis pedis pulses are 2+ on the left side.        Posterior tibial pulses are 2+ on the left side.   Pulmonary:      Effort: Pulmonary effort is normal.   Musculoskeletal:      Left knee: Normal.      Right lower leg: No edema.      Left lower leg: No edema.      Left ankle: Normal.        Legs:       Comments: Ambulatory without assistive device    Neurological:      Mental Status: He is alert.      Cranial Nerves: No cranial nerve deficit.   Psychiatric:         Mood and Affect: Mood and affect normal.       Assessment:       1. Chronic hip pain, left    2. Strain of flexor muscle of left hip, subsequent encounter        Plan:   1. Chronic hip pain, left    2. Strain of flexor muscle of left hip, subsequent encounter  -      Ambulatory referral/consult to Physical/Occupational Therapy; Future; Expected date: 07/13/2023  -     naproxen (NAPROSYN) 500 MG tablet; Take 1 tablet (500 mg total) by mouth 2 (two) times daily with meals. Take with food  Dispense: 30 tablet; Refill: 0      Warm compresses alternating with cool compresses.  Shown exercises, will schedule with PT, start naproxen consecutively for at least 5-7 days, recommend no running right now as this appears to be an overuse injury with lack of stretching.

## 2023-07-10 ENCOUNTER — CLINICAL SUPPORT (OUTPATIENT)
Dept: REHABILITATION | Facility: HOSPITAL | Age: 33
End: 2023-07-10
Payer: MEDICAID

## 2023-07-10 DIAGNOSIS — Z74.09 DECREASED MOBILITY AND ENDURANCE: ICD-10-CM

## 2023-07-10 DIAGNOSIS — M62.552 MUSCLE WASTING AND ATROPHY, NOT ELSEWHERE CLASSIFIED, LEFT THIGH: ICD-10-CM

## 2023-07-10 DIAGNOSIS — S76.012D STRAIN OF FLEXOR MUSCLE OF LEFT HIP, SUBSEQUENT ENCOUNTER: ICD-10-CM

## 2023-07-10 DIAGNOSIS — M25.652 STIFFNESS OF LEFT HIP JOINT: ICD-10-CM

## 2023-07-10 DIAGNOSIS — M25.552 PAIN IN LEFT HIP: Primary | ICD-10-CM

## 2023-07-10 PROCEDURE — 97110 THERAPEUTIC EXERCISES: CPT

## 2023-07-10 PROCEDURE — 97162 PT EVAL MOD COMPLEX 30 MIN: CPT

## 2023-07-10 NOTE — PLAN OF CARE
[facility]   Physical Therapy Initial Evaluation      Date: 7/10/2023   Name: Danisha Jones  Clinic Number: [id number]    Therapy Diagnosis:   Encounter Diagnoses   Name Primary?    Pain in left hip Yes    Stiffness of left hip joint     Decreased mobility and endurance     Strain of flexor muscle of left hip, subsequent encounter     Muscle wasting and atrophy, not elsewhere classified, left thigh       Physician: Gladys Kessler PA*     Physician Orders: PT Eval and Treat  Medical Diagnosis from Referral: Strain of flexor muscle of left hip, subsequent encounter  Evaluation Date: 7/10/2023  Authorization Period Expiration: 07/05/2024  Plan of Care Expiration: 09/04/2023  Progress Note Due: 08/09/2023  Visit # / Visits authorized: 1/1   FOTO: 1/3 (last performed on 7/10/2023)    Precautions: Standard, anemia    Time In: 1515   Time Out: 1600  Total Billable Time (timed & untimed codes): 45 minutes    Subjective     Date of onset:     History of current condition - Danisha reports he has been having pain and tightness in the left, anterior hip for about a year now. Patient states the only thing he can attribute his symptoms to is his long distance running that he performs regularly to train for marathons. Patient states he get anterior hip pain about 3 miles into a run and the pain worsens as he continues. Patient states the pain is reduced with rest and hip flexor stretching. Patient states he works out regularly but does not specifically train the hip flexors. Patient states since he began having this pain he has continued to perform his running until as of recently he reduced to walking. Patient states in the past year he has completed 3 marathons and at his peak of running was running about 40 miles a week at approximately 12 minutes per mile with a sonia around 150. Patient notes he has some similar but not as severe pain in the right as well. Patient denies history of low  back pain and imaging clears him of any bony abnormalities in the hip.     Falls: 0    Imaging: [x] Xray [] MRI [] CT: Performed on: 07/26/2023   -No acute fracture or dislocation.  No evidence of AVN.  No change since 07/03/2019    Pain:  Current 4/10, worst 7/10, best 2/10   Location: [] Right   [x] Left:  hip, minor pain of same nature on right  Description: throbbing  Aggravating Factors: going for run after longer than 3 miles, leg exercises involving deep squats  Easing Factors: activity avoidance, rest, runners lunge stretch     Prior Therapy:   [] N/A    [x] Yes: left foot  Social History: Pt lives alone. 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   Prior Level of Function: Independent and pain free with all ADL, IADL, community mobility and functional activities.   Current Level of Function: Independent with all ADL, IADL, community mobility and functional activities with reports of increased pain and need for increased time and frequent breaks.      Dominant Extremity:    [x] Right    [] Left    Pts goals: Pt reported goals are to decrease overall pain levels in order to return to prior functional level. Patient would like to return to running and lifting.     Medical History:   Past Medical History:   Diagnosis Date    Anemia     Herpes simplex virus type 1 (HSV-1) dermatitis        Surgical History:   Danisha Jones  has no past surgical history on file.    Medications:   Danisha has a current medication list which includes the following prescription(s): albuterol, mupirocin, naproxen, and sertraline.    Allergies:   Review of patient's allergies indicates:  No Known Allergies     Objective        RANGE OF MOTION:   Hip AROM/PROM Right Left Pain/Dysfunction with Movement Goal   Hip Flexion (120º) 120 120 Mild pinch in anterior hip No pain   Hip Extension (30º) 30 20 Stretch on left 30   Hip Abduction (45º)       Hip IR (45º) 30 30     Hip ER  (45º) 60 60       STRENGTH:   L/E MMT Right  (spine) Left Pain/Dysfunction with Movement Goal   Hip Flexion  4/5 3/5 Pain 4+/5 B   Hip Extension  4+/5 4/5  4+/5 B   Hip Abduction  4+/5 4+/5  4+/5 B   Knee Extension 4+/5 4/5 Pain 5/5 B   Knee Flexion 4+/5 4+/5  5/5 B   Hip IR 5/5 4/5  4+/5 B   Hip ER 5/5 4+/5  4+/5 B   Ankle DF 5/5 5/5  5/5 B   Ankle PF 5/5 5/5  5/5 B   Ankle Inversion 5/5 5/5  5/5 B   Ankle Eversion 5/5 5/5  5/5 B          MUSCLE LENGTH:   Muscle Tested  Right Left  Limitation Goal   Hip Flexors [] Normal  [x] Limited [] Normal  [x] Limited  Normal B   ITB / TFL [] Normal  [x] Limited [] Normal  [x] Limited  Normal B   Quadriceps [] Normal  [x] Limited [] Normal  [x] Limited  Normal B   Hamstrings  [x] Normal  [] Limited [x] Normal  [] Limited  Normal B          JOINT MOBILITY:   Joint Motion Right Mobility  (spine) Left Mobility Goal   Hip Distraction [] Hypo     [x] Normal     [] Hyper [x] Hypo     [] Normal     [] Hyper Normal    Hip Inferior Glide [] Hypo     [x] Normal     [] Hyper [x] Hypo     [] Normal     [] Hyper Normal    Hip Medial Glide  [] Hypo     [x] Normal     [] Hyper [] Hypo     [x] Normal     [] Hyper Normal    Hip Lateral Glide  [] Hypo     [x] Normal     [] Hyper [] Hypo     [x] Normal     [] Hyper Normal    Knee:  [] Hypo     [x] Normal     [] Hyper [] Hypo     [x] Normal     [] Hyper Normal      SPECIAL TESTS:    Right  (spine) Left  Goal   Straight Leg Raise [] Positive    [x] Negative [x] Positive    [] Negative Negative B    Hip Distraction   Negative Positive    CHAD [] Positive    [x] Negative [] Positive    [x] Negative Negative B           SENSATION  [x] Intact to Light Touch   [] Impaired:      PALPATION: Muscles: Increased tone and tenderness to palpation of: left , hip flexors. Structures: Increased tenderness to palpation of: left , LOWER STRUCTURES : ASIS      POSTURE:  Pt presents with postural abnormalities which include:    [x] Forward Head   [] Increased  Lumbar Lordosis   [x] Rounded Shoulder   [] Genu Recurvatum   [] Increased Thoracic Kyphosis [] Genu Valgus   [] Trunk Deviated    [] Pes Planus   [] Scapular Winging    [] Other:       Functional Task:   - Squat: able to perform with good form, slightly excessive anterior tibial translation. Notes some hip pain after repetition.  - Single leg squat: able to perform with good control and technique.       Function:     Intake Outcome Measure for FOTO Hip Survey    Therapist reviewed FOTO scores for Danisha on 7/10/2023.   FOTO documents entered into [employer] - see Media section.    Intake Score: 58%         Treatment     Total Treatment time (time-based codes) separate from Evaluation: (25) minutes     Danisha received the treatments listed below:      THERAPEUTIC EXERCISES to develop strength, endurance, ROM, flexibility, posture, and core stabilization for (25) minutes including:    Intervention Performed Today    Home exercise plan  x Demonstration, education, performance   Hip flexion Isometric     Kneeling Lunge Stretch     Posterior Pelvic Tilt with March                           Plan for Next Visit: Assess irritability of hip flexor and progress strengthening as tolerated. Standing marches, amparo stretch eccetric, hip thrust, SLR, seated SLR       Patient Education and Home Exercises     Education provided: time included in treatment time.   PURPOSE: Patient educated on the impairments noted above and the effects of physical therapy intervention to improve overall condition and QOL.   EXERCISE: Patient was educated on all the above exercise prior/during/after for proper posture, positioning, and execution for safe performance with home exercise program.   STRENGTH: Patient educated on the importance of improved core and extremity strength in order to improve alignment of the spine and extremities with static positions and dynamic movement.     Written Home Exercises Provided: yes.  Exercises were reviewed  "and Danisha was able to demonstrate them prior to the end of the session.  Danisha demonstrated good  understanding of the education provided. See EMR under Patient Instructions for exercises provided during therapy sessions.    Assessment     Danisha is a 32 y.o. male referred to outpatient Physical Therapy with a medical diagnosis of Strain of flexor muscle of left hip, subsequent encounter. Pt presents with impairments in the following categories: IMPAIRMENTS: ROM, strength, endurance, joint mobility, and muscle length    Pt prognosis is Excellent  Pt will benefit from skilled outpatient Physical Therapy to address the deficits stated above and in the chart below, provide pt/family education, and to maximize pt's level of independence.     Plan of care discussed with patient: Yes  Pt's spiritual, cultural and educational needs considered and patient is agreeable to the plan of care and goals as stated below:     Anticipated Barriers for therapy: co-morbidities    Medical Necessity is demonstrated by the following  History  Co-morbidities and personal factors that may impact the plan of care [] LOW: no personal factors / co-morbidities  [x] MODERATE: 1-2 personal factors / co-morbidities  [] HIGH: 3+ personal factors / co-morbidities    Moderate / High Support Documentation:   Past Medical History:   Diagnosis Date    Anemia     Herpes simplex virus type 1 (HSV-1) dermatitis         Examination  Body Structures and Functions, activity limitations and participation restrictions that may impact the plan of care [] LOW: addressing 1-2 elements  [x] MODERATE: 3+ elements  [] HIGH: 4+ elements (please support below)    Moderate / High Support Documentation: See above in "Current Level of Function"      Clinical Presentation [] LOW: stable  [x] MODERATE: Evolving  [] HIGH: Unstable     Decision Making/ Complexity Score: moderate         Short Term Goals:  4 weeks Status  Date Met   PAIN: Pt will report worst pain " of 4/10 in order to progress toward max functional ability and improve quality of life. [x] Progressing  [] Met  [] Not Met    FUNCTION: Patient will demonstrate improved function as indicated by a score of greater than or equal to 66 out of 100 on FOTO. [x] Progressing  [] Met  [] Not Met    MOBILITY: Patient will improve AROM to 50% of stated goals, listed in objective measures above, in order to progress towards independence with functional activities.  [x] Progressing  [] Met  [] Not Met    STRENGTH: Patient will improve strength to 50% of stated goals, listed in objective measures above, in order to progress towards independence with functional activities. [x] Progressing  [] Met  [] Not Met    POSTURE: Patient will correct postural deviations in sitting and standing, to decrease pain and promote long term stability.  [x] Progressing  [] Met  [] Not Met    HEP: Patient will demonstrate independence with HEP in order to progress toward functional independence. [x] Progressing  [] Met  [] Not Met    Patient will bel able to return to working out without onset of anterior hip symptoms.  [x] Progressing  [] Met  [] Not Met      Long Term Goals:  8 weeks Status Date Met   PAIN: Pt will report worst pain of 1/10 in order to progress toward max functional ability and improve quality of life [x] Progressing  [] Met  [] Not Met    FUNCTION: Patient will demonstrate improved function as indicated by a score of greater than or equal to 75 out of 100 on FOTO. [x] Progressing  [] Met  [] Not Met    MOBILITY: Patient will improve AROM to stated goals, listed in objective measures above, in order to return to maximal functional potential and improve quality of life.  [x] Progressing  [] Met  [] Not Met    STRENGTH: Patient will improve strength to stated goals, listed in objective measures above, in order to improve functional independence and quality of life.  [x] Progressing  [] Met  [] Not Met    GAIT: Patient will  demonstrate normalized gait mechanics with minimal compensation in order to return to PLOF. [x] Progressing  [] Met  [] Not Met    Patient will return to normal ADL's, IADL's, community involvement, recreational activities, and work-related activities with less than or equal to 1/10 pain and maximal function.  [x] Progressing  [] Met  [] Not Met    Patient will be able to return to running more than 5 miles without onset of symptoms in anterior hip.  [x] Progressing  [] Met  [] Not Met      Plan     Plan of care Certification: 7/10/2023 to 09/04/2023.    Outpatient Physical Therapy 2 times weekly for 8 weeks to include any combination of the following interventions: virtual visits, dry needling, modalities, electrical stimulation (IFC, Pre-Mod, Attended with Functional Dry Needling), Cervical/Lumbar Traction, Gait Training, Manual Therapy, Neuromuscular Re-ed, Patient Education, Self Care, Therapeutic Exercise, and Therapeutic Activites     Ted Fuentes, PT, DPT

## 2023-07-10 NOTE — PROGRESS NOTES
[facility]   Physical Therapy Initial Evaluation      Date: 7/10/2023   Name: Danisha Jones  Clinic Number: [id number]    Therapy Diagnosis:   Encounter Diagnoses   Name Primary?    Pain in left hip Yes    Stiffness of left hip joint     Decreased mobility and endurance     Strain of flexor muscle of left hip, subsequent encounter     Muscle wasting and atrophy, not elsewhere classified, left thigh       Physician: Gladys Kessler PA*     Physician Orders: PT Eval and Treat  Medical Diagnosis from Referral: Strain of flexor muscle of left hip, subsequent encounter  Evaluation Date: 7/10/2023  Authorization Period Expiration: 07/05/2024  Plan of Care Expiration: 09/04/2023  Progress Note Due: 08/09/2023  Visit # / Visits authorized: 1/1   FOTO: 1/3 (last performed on 7/10/2023)    Precautions: Standard, anemia    Time In: 1515   Time Out: 1600  Total Billable Time (timed & untimed codes): 45 minutes    Subjective     Date of onset:     History of current condition - Danisha reports he has been having pain and tightness in the left, anterior hip for about a year now. Patient states the only thing he can attribute his symptoms to is his long distance running that he performs regularly to train for marathons. Patient states he get anterior hip pain about 3 miles into a run and the pain worsens as he continues. Patient states the pain is reduced with rest and hip flexor stretching. Patient states he works out regularly but does not specifically train the hip flexors. Patient states since he began having this pain he has continued to perform his running until as of recently he reduced to walking. Patient states in the past year he has completed 3 marathons and at his peak of running was running about 40 miles a week at approximately 12 minutes per mile with a sonia around 150. Patient notes he has some similar but not as severe pain in the right as well. Patient denies history of low  back pain and imaging clears him of any bony abnormalities in the hip.     Falls: 0    Imaging: [x] Xray [] MRI [] CT: Performed on: 07/26/2023   -No acute fracture or dislocation.  No evidence of AVN.  No change since 07/03/2019    Pain:  Current 4/10, worst 7/10, best 2/10   Location: [] Right   [x] Left:  hip, minor pain of same nature on right  Description: throbbing  Aggravating Factors: going for run after longer than 3 miles, leg exercises involving deep squats  Easing Factors: activity avoidance, rest, runners lunge stretch     Prior Therapy:   [] N/A    [x] Yes: left foot  Social History: Pt lives alone. 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   Prior Level of Function: Independent and pain free with all ADL, IADL, community mobility and functional activities.   Current Level of Function: Independent with all ADL, IADL, community mobility and functional activities with reports of increased pain and need for increased time and frequent breaks.      Dominant Extremity:    [x] Right    [] Left    Pts goals: Pt reported goals are to decrease overall pain levels in order to return to prior functional level. Patient would like to return to running and lifting.     Medical History:   Past Medical History:   Diagnosis Date    Anemia     Herpes simplex virus type 1 (HSV-1) dermatitis        Surgical History:   Danisha Jones  has no past surgical history on file.    Medications:   Danisha has a current medication list which includes the following prescription(s): albuterol, mupirocin, naproxen, and sertraline.    Allergies:   Review of patient's allergies indicates:  No Known Allergies     Objective        RANGE OF MOTION:   Hip AROM/PROM Right Left Pain/Dysfunction with Movement Goal   Hip Flexion (120º) 120 120 Mild pinch in anterior hip No pain   Hip Extension (30º) 30 20 Stretch on left 30   Hip Abduction (45º)       Hip IR (45º) 30 30     Hip ER  (45º) 60 60       STRENGTH:   L/E MMT Right  (spine) Left Pain/Dysfunction with Movement Goal   Hip Flexion  4/5 3/5 Pain 4+/5 B   Hip Extension  4+/5 4/5  4+/5 B   Hip Abduction  4+/5 4+/5  4+/5 B   Knee Extension 4+/5 4/5 Pain 5/5 B   Knee Flexion 4+/5 4+/5  5/5 B   Hip IR 5/5 4/5  4+/5 B   Hip ER 5/5 4+/5  4+/5 B   Ankle DF 5/5 5/5  5/5 B   Ankle PF 5/5 5/5  5/5 B   Ankle Inversion 5/5 5/5  5/5 B   Ankle Eversion 5/5 5/5  5/5 B          MUSCLE LENGTH:   Muscle Tested  Right Left  Limitation Goal   Hip Flexors [] Normal  [x] Limited [] Normal  [x] Limited  Normal B   ITB / TFL [] Normal  [x] Limited [] Normal  [x] Limited  Normal B   Quadriceps [] Normal  [x] Limited [] Normal  [x] Limited  Normal B   Hamstrings  [x] Normal  [] Limited [x] Normal  [] Limited  Normal B          JOINT MOBILITY:   Joint Motion Right Mobility  (spine) Left Mobility Goal   Hip Distraction [] Hypo     [x] Normal     [] Hyper [x] Hypo     [] Normal     [] Hyper Normal    Hip Inferior Glide [] Hypo     [x] Normal     [] Hyper [x] Hypo     [] Normal     [] Hyper Normal    Hip Medial Glide  [] Hypo     [x] Normal     [] Hyper [] Hypo     [x] Normal     [] Hyper Normal    Hip Lateral Glide  [] Hypo     [x] Normal     [] Hyper [] Hypo     [x] Normal     [] Hyper Normal    Knee:  [] Hypo     [x] Normal     [] Hyper [] Hypo     [x] Normal     [] Hyper Normal      SPECIAL TESTS:    Right  (spine) Left  Goal   Straight Leg Raise [] Positive    [x] Negative [x] Positive    [] Negative Negative B    Hip Distraction   Negative Positive    CHAD [] Positive    [x] Negative [] Positive    [x] Negative Negative B           SENSATION  [x] Intact to Light Touch   [] Impaired:      PALPATION: Muscles: Increased tone and tenderness to palpation of: left , hip flexors. Structures: Increased tenderness to palpation of: left , LOWER STRUCTURES : ASIS      POSTURE:  Pt presents with postural abnormalities which include:    [x] Forward Head   [] Increased  Lumbar Lordosis   [x] Rounded Shoulder   [] Genu Recurvatum   [] Increased Thoracic Kyphosis [] Genu Valgus   [] Trunk Deviated    [] Pes Planus   [] Scapular Winging    [] Other:       Functional Task:   - Squat: able to perform with good form, slightly excessive anterior tibial translation. Notes some hip pain after repetition.  - Single leg squat: able to perform with good control and technique.       Function:     Intake Outcome Measure for FOTO Hip Survey    Therapist reviewed FOTO scores for Danisha on 7/10/2023.   FOTO documents entered into [employer] - see Media section.    Intake Score: 58%         Treatment     Total Treatment time (time-based codes) separate from Evaluation: (25) minutes     Danisha received the treatments listed below:      THERAPEUTIC EXERCISES to develop strength, endurance, ROM, flexibility, posture, and core stabilization for (25) minutes including:    Intervention Performed Today    Home exercise plan  x Demonstration, education, performance   Hip flexion Isometric     Kneeling Lunge Stretch     Posterior Pelvic Tilt with March                           Plan for Next Visit: Assess irritability of hip flexor and progress strengthening as tolerated. Standing marches, amparo stretch eccetric, hip thrust, SLR, seated SLR       Patient Education and Home Exercises     Education provided: time included in treatment time.   PURPOSE: Patient educated on the impairments noted above and the effects of physical therapy intervention to improve overall condition and QOL.   EXERCISE: Patient was educated on all the above exercise prior/during/after for proper posture, positioning, and execution for safe performance with home exercise program.   STRENGTH: Patient educated on the importance of improved core and extremity strength in order to improve alignment of the spine and extremities with static positions and dynamic movement.     Written Home Exercises Provided: yes.  Exercises were reviewed  "and Danisha was able to demonstrate them prior to the end of the session.  Danisha demonstrated good  understanding of the education provided. See EMR under Patient Instructions for exercises provided during therapy sessions.    Assessment     Danisha is a 32 y.o. male referred to outpatient Physical Therapy with a medical diagnosis of Strain of flexor muscle of left hip, subsequent encounter. Pt presents with impairments in the following categories: IMPAIRMENTS: ROM, strength, endurance, joint mobility, and muscle length    Pt prognosis is Excellent  Pt will benefit from skilled outpatient Physical Therapy to address the deficits stated above and in the chart below, provide pt/family education, and to maximize pt's level of independence.     Plan of care discussed with patient: Yes  Pt's spiritual, cultural and educational needs considered and patient is agreeable to the plan of care and goals as stated below:     Anticipated Barriers for therapy: co-morbidities    Medical Necessity is demonstrated by the following  History  Co-morbidities and personal factors that may impact the plan of care [] LOW: no personal factors / co-morbidities  [x] MODERATE: 1-2 personal factors / co-morbidities  [] HIGH: 3+ personal factors / co-morbidities    Moderate / High Support Documentation:   Past Medical History:   Diagnosis Date    Anemia     Herpes simplex virus type 1 (HSV-1) dermatitis         Examination  Body Structures and Functions, activity limitations and participation restrictions that may impact the plan of care [] LOW: addressing 1-2 elements  [x] MODERATE: 3+ elements  [] HIGH: 4+ elements (please support below)    Moderate / High Support Documentation: See above in "Current Level of Function"      Clinical Presentation [] LOW: stable  [x] MODERATE: Evolving  [] HIGH: Unstable     Decision Making/ Complexity Score: moderate         Short Term Goals:  4 weeks Status  Date Met   PAIN: Pt will report worst pain " of 4/10 in order to progress toward max functional ability and improve quality of life. [x] Progressing  [] Met  [] Not Met    FUNCTION: Patient will demonstrate improved function as indicated by a score of greater than or equal to 66 out of 100 on FOTO. [x] Progressing  [] Met  [] Not Met    MOBILITY: Patient will improve AROM to 50% of stated goals, listed in objective measures above, in order to progress towards independence with functional activities.  [x] Progressing  [] Met  [] Not Met    STRENGTH: Patient will improve strength to 50% of stated goals, listed in objective measures above, in order to progress towards independence with functional activities. [x] Progressing  [] Met  [] Not Met    POSTURE: Patient will correct postural deviations in sitting and standing, to decrease pain and promote long term stability.  [x] Progressing  [] Met  [] Not Met    HEP: Patient will demonstrate independence with HEP in order to progress toward functional independence. [x] Progressing  [] Met  [] Not Met    Patient will bel able to return to working out without onset of anterior hip symptoms.  [x] Progressing  [] Met  [] Not Met      Long Term Goals:  8 weeks Status Date Met   PAIN: Pt will report worst pain of 1/10 in order to progress toward max functional ability and improve quality of life [x] Progressing  [] Met  [] Not Met    FUNCTION: Patient will demonstrate improved function as indicated by a score of greater than or equal to 75 out of 100 on FOTO. [x] Progressing  [] Met  [] Not Met    MOBILITY: Patient will improve AROM to stated goals, listed in objective measures above, in order to return to maximal functional potential and improve quality of life.  [x] Progressing  [] Met  [] Not Met    STRENGTH: Patient will improve strength to stated goals, listed in objective measures above, in order to improve functional independence and quality of life.  [x] Progressing  [] Met  [] Not Met    GAIT: Patient will  demonstrate normalized gait mechanics with minimal compensation in order to return to PLOF. [x] Progressing  [] Met  [] Not Met    Patient will return to normal ADL's, IADL's, community involvement, recreational activities, and work-related activities with less than or equal to 1/10 pain and maximal function.  [x] Progressing  [] Met  [] Not Met    Patient will be able to return to running more than 5 miles without onset of symptoms in anterior hip.  [x] Progressing  [] Met  [] Not Met      Plan     Plan of care Certification: 7/10/2023 to 09/04/2023.    Outpatient Physical Therapy 2 times weekly for 8 weeks to include any combination of the following interventions: virtual visits, dry needling, modalities, electrical stimulation (IFC, Pre-Mod, Attended with Functional Dry Needling), Cervical/Lumbar Traction, Gait Training, Manual Therapy, Neuromuscular Re-ed, Patient Education, Self Care, Therapeutic Exercise, and Therapeutic Activites     Ted Fuentes, PT, DPT

## 2023-07-17 ENCOUNTER — CLINICAL SUPPORT (OUTPATIENT)
Dept: REHABILITATION | Facility: HOSPITAL | Age: 33
End: 2023-07-17
Payer: MEDICAID

## 2023-07-17 DIAGNOSIS — M62.552 MUSCLE WASTING AND ATROPHY, NOT ELSEWHERE CLASSIFIED, LEFT THIGH: ICD-10-CM

## 2023-07-17 DIAGNOSIS — Z74.09 DECREASED MOBILITY AND ENDURANCE: ICD-10-CM

## 2023-07-17 DIAGNOSIS — M25.652 STIFFNESS OF LEFT HIP JOINT: ICD-10-CM

## 2023-07-17 DIAGNOSIS — M25.552 PAIN IN LEFT HIP: Primary | ICD-10-CM

## 2023-07-17 PROCEDURE — 97110 THERAPEUTIC EXERCISES: CPT

## 2023-07-17 NOTE — PROGRESS NOTES
[facility]   Physical Therapy Treatment Note        Name: Danisha Jones  Clinic Number: [id number]    Therapy Diagnosis:   Encounter Diagnoses   Name Primary?    Pain in left hip Yes    Stiffness of left hip joint     Decreased mobility and endurance     Muscle wasting and atrophy, not elsewhere classified, left thigh      Physician: Gladys Kessler PA*    Visit Date: 7/17/2023    Physician Orders: PT Eval and Treat  Medical Diagnosis from Referral: Strain of flexor muscle of left hip, subsequent encounter  Evaluation Date: 7/10/2023  Authorization Period Expiration: 10/17/2023  Plan of Care Expiration: 09/04/2023  Progress Note Due: 08/09/2023  Visit # / Visits authorized: 1/20 (+1 evaluation)   FOTO: 1/3 (last performed on 7/10/2023)     Precautions: Standard, anemia  PTA Visit #: 0/5     Time In: 0945  Time Out: 1035  Total Billable Time: 50 minutes (Billing reflects 1 on 1 treatment time spent with patient)    Subjective     Patient reports: he was feeling mildly sore following evaluation in left anterior hip. Patient states he was able to complete home exercise plan without issue or symptom increase. Patient states he has been walking for 30-45 minutes on treadmill daily and stops at symptom onset.     He/She was compliant with home exercise program.  Response to previous treatment: Mild Soreness  Functional change: performing home exercise plan     Pain: 3/10     Location: Left Hip     Objective      Objective Measures updated at progress report or POC update only unless otherwise noted.       Treatment     Danisha received the treatments listed below:     THERAPEUTIC EXERCISES to develop strength, endurance, ROM, flexibility, posture, and core stabilization for (48) minutes including:     Intervention Performed Today     Bike x 7 minutes   Hip flexion Isometric  x  10x10 second hold   Kneeling Lunge Stretch or Hamilton stretch  x 2x1' bilateral (Hamilton stretch today)    Posterior Pelvic Tilt  x 10x10 second hold   Posterior Pelvic Tilt with March x  x15    Knee Extensions x X30 single leg with 35#   Hip CARs  x X10 clock wise and counter clock wise bilateral    Bridges x 3x15   Side Lying Hip Abduction x 3x15 bilateral                       Plan for Next Visit: Assess irritability of hip flexor and progress strengthening as tolerated.       Patient Education and Home Exercises       Home Exercises Provided and Patient Education Provided     Education provided: time included in treatment time.   PURPOSE: Patient educated on the impairments noted above and the effects of physical therapy intervention to improve overall condition and QOL.   EXERCISE: Patient was educated on all the above exercise prior/during/after for proper posture, positioning, and execution for safe performance with home exercise program.   STRENGTH: Patient educated on the importance of improved core and extremity strength in order to improve alignment of the spine and extremities with static positions and dynamic movement.   POSTURE: Patient educated on postural awareness to reduce stress and maintain optimal alignment of the spine with static positions and dynamic movement     Written Home Exercises Provided: yes.  Exercises were reviewed and Danisha was able to demonstrate them prior to the end of the session.  Danisha demonstrated good  understanding of the education provided. See EMR under Patient Instructions for exercises provided during therapy sessions.    Assessment     Patient tolerated today's session well. Patient able to demonstrate home exercise plan with minimal cueing or increase in pain. Patient states his pain maintains at a 3/10 at the highest. Patient progressed with hip strengthening and progressive hip flexor strengthening. Patient unable to perform seated straight leg raise due to pain but able to perform supine straight leg raise with minimal pain.     Danisha is progressing well  towards his goals.   Patient prognosis is Excellent.     Patient will continue to benefit from skilled outpatient physical therapy to address the deficits listed in the problem list box on initial evaluation, provide pt/family education and to maximize patient's level of independence in the home and community environment.     Patient's spiritual, cultural and educational needs considered and pt agreeable to plan of care and goals.     Anticipated Barriers for therapy: co-morbidities       Short Term Goals:  4 weeks Status  Date Met   PAIN: Pt will report worst pain of 4/10 in order to progress toward max functional ability and improve quality of life. [x] Progressing  [] Met  [] Not Met     FUNCTION: Patient will demonstrate improved function as indicated by a score of greater than or equal to 66 out of 100 on FOTO. [x] Progressing  [] Met  [] Not Met     MOBILITY: Patient will improve AROM to 50% of stated goals, listed in objective measures above, in order to progress towards independence with functional activities.  [x] Progressing  [] Met  [] Not Met     STRENGTH: Patient will improve strength to 50% of stated goals, listed in objective measures above, in order to progress towards independence with functional activities. [x] Progressing  [] Met  [] Not Met     POSTURE: Patient will correct postural deviations in sitting and standing, to decrease pain and promote long term stability.  [x] Progressing  [] Met  [] Not Met     HEP: Patient will demonstrate independence with HEP in order to progress toward functional independence. [x] Progressing  [] Met  [] Not Met     Patient will bel able to return to working out without onset of anterior hip symptoms.  [x] Progressing  [] Met  [] Not Met        Long Term Goals:  8 weeks Status Date Met   PAIN: Pt will report worst pain of 1/10 in order to progress toward max functional ability and improve quality of life [x] Progressing  [] Met  [] Not Met     FUNCTION: Patient  will demonstrate improved function as indicated by a score of greater than or equal to 75 out of 100 on FOTO. [x] Progressing  [] Met  [] Not Met     MOBILITY: Patient will improve AROM to stated goals, listed in objective measures above, in order to return to maximal functional potential and improve quality of life.  [x] Progressing  [] Met  [] Not Met     STRENGTH: Patient will improve strength to stated goals, listed in objective measures above, in order to improve functional independence and quality of life.  [x] Progressing  [] Met  [] Not Met     GAIT: Patient will demonstrate normalized gait mechanics with minimal compensation in order to return to PLOF. [x] Progressing  [] Met  [] Not Met     Patient will return to normal ADL's, IADL's, community involvement, recreational activities, and work-related activities with less than or equal to 1/10 pain and maximal function.  [x] Progressing  [] Met  [] Not Met     Patient will be able to return to running more than 5 miles without onset of symptoms in anterior hip.  [x] Progressing  [] Met  [] Not Met          Plan     Continue Plan of Care (POC) and progress per patient tolerance. See treatment section for details on planned progressions next session.      Ted Fuentes, PT

## 2023-07-19 ENCOUNTER — CLINICAL SUPPORT (OUTPATIENT)
Dept: REHABILITATION | Facility: HOSPITAL | Age: 33
End: 2023-07-19
Payer: MEDICAID

## 2023-07-19 DIAGNOSIS — M62.552 MUSCLE WASTING AND ATROPHY, NOT ELSEWHERE CLASSIFIED, LEFT THIGH: ICD-10-CM

## 2023-07-19 DIAGNOSIS — M25.552 PAIN IN LEFT HIP: Primary | ICD-10-CM

## 2023-07-19 DIAGNOSIS — M25.652 STIFFNESS OF LEFT HIP JOINT: ICD-10-CM

## 2023-07-19 DIAGNOSIS — Z74.09 DECREASED MOBILITY AND ENDURANCE: ICD-10-CM

## 2023-07-19 PROCEDURE — 97110 THERAPEUTIC EXERCISES: CPT

## 2023-07-19 NOTE — PROGRESS NOTES
[facility]   Physical Therapy Treatment Note        Name: Danisha Jones  Clinic Number: [id number]    Therapy Diagnosis:   Encounter Diagnoses   Name Primary?    Pain in left hip Yes    Stiffness of left hip joint     Decreased mobility and endurance     Muscle wasting and atrophy, not elsewhere classified, left thigh      Physician: Gladys Kessler PA*    Visit Date: 7/19/2023    Physician Orders: PT Eval and Treat  Medical Diagnosis from Referral: Strain of flexor muscle of left hip, subsequent encounter  Evaluation Date: 7/10/2023  Authorization Period Expiration: 10/17/2023  Plan of Care Expiration: 09/04/2023  Progress Note Due: 08/09/2023  Visit # / Visits authorized: 2/20 (+1 evaluation)   FOTO: 1/3 (last performed on 7/10/2023)     Precautions: Standard, anemia  PTA Visit #: 0/5     Time In: 1120  Time Out: 1205  Total Billable Time: 45 minutes (Billing reflects 1 on 1 treatment time spent with patient)    Subjective     Patient reports: no severe soreness or increase in symptoms following previous session. Patient does not mild soreness that last a few hours before passing.     He/She was compliant with home exercise program.  Response to previous treatment: Mild Soreness  Functional change: performing home exercise plan     Pain: 3/10     Location: Left Hip     Objective      Objective Measures updated at progress report or POC update only unless otherwise noted.       Treatment     Danisha received the treatments listed below:     THERAPEUTIC EXERCISES to develop strength, endurance, ROM, flexibility, posture, and core stabilization for (44) minutes including:     Intervention Performed Today     Bike x 7 minutes   Hip flexion Isometric  x  10x10 second hold   Kneeling Lunge Stretch or Hamilton stretch  x 2x1' bilateral (Hamilton stretch today)   Posterior Pelvic Tilt  x 10x10 second hold   Deadbugs x  2x20    Knee Extensions  X30 single leg with 35#   Hip CARs   X10 clock  wise and counter clock wise bilateral    Bridges  3x15   Side Lying Hip Abduction  3x15 bilateral    Hip Thrust x 3x10 with 20# KB   Side Stepping x 2 laps Blue TB   Single Leg Foot Taps x X15 bilateral, 24 inch step in front and cone behind   Resisted Backwards Walking x X20 with 35#                      Plan for Next Visit: Assess irritability of hip flexor and progress strengthening as tolerated.       Patient Education and Home Exercises       Home Exercises Provided and Patient Education Provided     Education provided: time included in treatment time.   PURPOSE: Patient educated on the impairments noted above and the effects of physical therapy intervention to improve overall condition and QOL.   EXERCISE: Patient was educated on all the above exercise prior/during/after for proper posture, positioning, and execution for safe performance with home exercise program.   STRENGTH: Patient educated on the importance of improved core and extremity strength in order to improve alignment of the spine and extremities with static positions and dynamic movement.   POSTURE: Patient educated on postural awareness to reduce stress and maintain optimal alignment of the spine with static positions and dynamic movement     Written Home Exercises Provided: yes.  Exercises were reviewed and Danisha was able to demonstrate them prior to the end of the session.  Danisha demonstrated good  understanding of the education provided. See EMR under Patient Instructions for exercises provided during therapy sessions.    Assessment     Patient tolerated today's session well. Patient able to progress core strengthening to deadbugs and demonstrates good form and muscle fatigue. Patient progressed to standing hip strengthening. Patient exhibits increased sway with standing in single limb stance with contralateral lower extremity movements.     Neotobin is progressing well towards his goals.   Patient prognosis is Excellent.     Patient  will continue to benefit from skilled outpatient physical therapy to address the deficits listed in the problem list box on initial evaluation, provide pt/family education and to maximize patient's level of independence in the home and community environment.     Patient's spiritual, cultural and educational needs considered and pt agreeable to plan of care and goals.     Anticipated Barriers for therapy: co-morbidities       Short Term Goals:  4 weeks Status  Date Met   PAIN: Pt will report worst pain of 4/10 in order to progress toward max functional ability and improve quality of life. [x] Progressing  [] Met  [] Not Met     FUNCTION: Patient will demonstrate improved function as indicated by a score of greater than or equal to 66 out of 100 on FOTO. [x] Progressing  [] Met  [] Not Met     MOBILITY: Patient will improve AROM to 50% of stated goals, listed in objective measures above, in order to progress towards independence with functional activities.  [x] Progressing  [] Met  [] Not Met     STRENGTH: Patient will improve strength to 50% of stated goals, listed in objective measures above, in order to progress towards independence with functional activities. [x] Progressing  [] Met  [] Not Met     POSTURE: Patient will correct postural deviations in sitting and standing, to decrease pain and promote long term stability.  [x] Progressing  [] Met  [] Not Met     HEP: Patient will demonstrate independence with HEP in order to progress toward functional independence. [x] Progressing  [] Met  [] Not Met     Patient will bel able to return to working out without onset of anterior hip symptoms.  [x] Progressing  [] Met  [] Not Met        Long Term Goals:  8 weeks Status Date Met   PAIN: Pt will report worst pain of 1/10 in order to progress toward max functional ability and improve quality of life [x] Progressing  [] Met  [] Not Met     FUNCTION: Patient will demonstrate improved function as indicated by a score of  greater than or equal to 75 out of 100 on FOTO. [x] Progressing  [] Met  [] Not Met     MOBILITY: Patient will improve AROM to stated goals, listed in objective measures above, in order to return to maximal functional potential and improve quality of life.  [x] Progressing  [] Met  [] Not Met     STRENGTH: Patient will improve strength to stated goals, listed in objective measures above, in order to improve functional independence and quality of life.  [x] Progressing  [] Met  [] Not Met     GAIT: Patient will demonstrate normalized gait mechanics with minimal compensation in order to return to PLOF. [x] Progressing  [] Met  [] Not Met     Patient will return to normal ADL's, IADL's, community involvement, recreational activities, and work-related activities with less than or equal to 1/10 pain and maximal function.  [x] Progressing  [] Met  [] Not Met     Patient will be able to return to running more than 5 miles without onset of symptoms in anterior hip.  [x] Progressing  [] Met  [] Not Met          Plan     Continue Plan of Care (POC) and progress per patient tolerance. See treatment section for details on planned progressions next session.      Ted Fuentes, PT

## 2023-07-24 ENCOUNTER — CLINICAL SUPPORT (OUTPATIENT)
Dept: REHABILITATION | Facility: HOSPITAL | Age: 33
End: 2023-07-24
Payer: MEDICAID

## 2023-07-24 DIAGNOSIS — M62.552 MUSCLE WASTING AND ATROPHY, NOT ELSEWHERE CLASSIFIED, LEFT THIGH: ICD-10-CM

## 2023-07-24 DIAGNOSIS — Z74.09 DECREASED MOBILITY AND ENDURANCE: ICD-10-CM

## 2023-07-24 DIAGNOSIS — M25.652 STIFFNESS OF LEFT HIP JOINT: ICD-10-CM

## 2023-07-24 DIAGNOSIS — M25.552 PAIN IN LEFT HIP: Primary | ICD-10-CM

## 2023-07-24 PROCEDURE — 97110 THERAPEUTIC EXERCISES: CPT

## 2023-07-24 NOTE — PROGRESS NOTES
[facility]   Physical Therapy Treatment Note        Name: Danisha Jones  Clinic Number: [id number]    Therapy Diagnosis:   Encounter Diagnoses   Name Primary?    Pain in left hip Yes    Stiffness of left hip joint     Decreased mobility and endurance     Muscle wasting and atrophy, not elsewhere classified, left thigh      Physician: Gladys Kessler PA*    Visit Date: 7/24/2023    Physician Orders: PT Eval and Treat  Medical Diagnosis from Referral: Strain of flexor muscle of left hip, subsequent encounter  Evaluation Date: 7/10/2023  Authorization Period Expiration: 10/17/2023  Plan of Care Expiration: 09/04/2023  Progress Note Due: 08/09/2023  Visit # / Visits authorized: 2/20 (+1 evaluation)   FOTO: 1/3 (last performed on 7/10/2023)     Precautions: Standard, anemia  PTA Visit #: 0/5     Time In: 1031  Time Out: 1132  Total Billable Time: 61 minutes (Billing reflects 1 on 1 treatment time spent with patient)    Subjective     Patient reports: feeling good after previous session and continues to work on home exercise plan consistently.     He/She was compliant with home exercise program.  Response to previous treatment: Mild Soreness  Functional change: performing home exercise plan     Pain: 2/10     Location: Left Hip     Objective      Objective Measures updated at progress report or POC update only unless otherwise noted.       Treatment     Danisha received the treatments listed below:     THERAPEUTIC EXERCISES to develop strength, endurance, ROM, flexibility, posture, and core stabilization for (60) minutes including:     Intervention Performed Today     Bike  x 7 minutes   Hip flexion Isometric  x  10x10 second hold   Kneeling Lunge Stretch or Hamilton stretch  x 2x1' bilateral (Hamilton stretch today)   Posterior Pelvic Tilt  x 10x10 second hold   Deadbugs  x  2x20    Knee Extensions x X30 with 75#   Hamstring Curls x X30 with 75#   Hip CARs   X10 clock wise and counter  clock conner bilateral    Bridges  3x15   Side Lying Hip Abduction  3x15 bilateral    Hip Thrust x 3x10 with 20# KB (up weight next visit)   Side Stepping x 2 laps Blue TB (3 laps next visit)   Single Leg Foot Taps x X15 bilateral, 24 inch step in front and cone behind   Resisted Backwards Walking x X20 with 40#   Eccentric Hip Flexion Off Table  x 2x10 with 10# bilateral    Step Push x 2 laps with                       Plan for Next Visit: Assess irritability of hip flexor and progress strengthening as tolerated.       Patient Education and Home Exercises       Home Exercises Provided and Patient Education Provided     Education provided: time included in treatment time.   PURPOSE: Patient educated on the impairments noted above and the effects of physical therapy intervention to improve overall condition and QOL.   EXERCISE: Patient was educated on all the above exercise prior/during/after for proper posture, positioning, and execution for safe performance with home exercise program.   STRENGTH: Patient educated on the importance of improved core and extremity strength in order to improve alignment of the spine and extremities with static positions and dynamic movement.   POSTURE: Patient educated on postural awareness to reduce stress and maintain optimal alignment of the spine with static positions and dynamic movement     Written Home Exercises Provided: yes.  Exercises were reviewed and Danisha was able to demonstrate them prior to the end of the session.  Danisha demonstrated good  understanding of the education provided. See EMR under Patient Instructions for exercises provided during therapy sessions.    Assessment     Patient tolerated today's session well. Patient able to progress hip flexion strengthening with supine hip flexion eccentric with controlled descent. Patient also progressed with performing hip flexion kicks to push step while walking. Patient notes less pain in hip with exercise today and  able to tolerate well with rest.     Danisha is progressing well towards his goals.   Patient prognosis is Excellent.     Patient will continue to benefit from skilled outpatient physical therapy to address the deficits listed in the problem list box on initial evaluation, provide pt/family education and to maximize patient's level of independence in the home and community environment.     Patient's spiritual, cultural and educational needs considered and pt agreeable to plan of care and goals.     Anticipated Barriers for therapy: co-morbidities       Short Term Goals:  4 weeks Status  Date Met   PAIN: Pt will report worst pain of 4/10 in order to progress toward max functional ability and improve quality of life. [x] Progressing  [] Met  [] Not Met     FUNCTION: Patient will demonstrate improved function as indicated by a score of greater than or equal to 66 out of 100 on FOTO. [x] Progressing  [] Met  [] Not Met     MOBILITY: Patient will improve AROM to 50% of stated goals, listed in objective measures above, in order to progress towards independence with functional activities.  [x] Progressing  [] Met  [] Not Met     STRENGTH: Patient will improve strength to 50% of stated goals, listed in objective measures above, in order to progress towards independence with functional activities. [x] Progressing  [] Met  [] Not Met     POSTURE: Patient will correct postural deviations in sitting and standing, to decrease pain and promote long term stability.  [x] Progressing  [] Met  [] Not Met     HEP: Patient will demonstrate independence with HEP in order to progress toward functional independence. [x] Progressing  [] Met  [] Not Met     Patient will bel able to return to working out without onset of anterior hip symptoms.  [x] Progressing  [] Met  [] Not Met        Long Term Goals:  8 weeks Status Date Met   PAIN: Pt will report worst pain of 1/10 in order to progress toward max functional ability and improve quality  of life [x] Progressing  [] Met  [] Not Met     FUNCTION: Patient will demonstrate improved function as indicated by a score of greater than or equal to 75 out of 100 on FOTO. [x] Progressing  [] Met  [] Not Met     MOBILITY: Patient will improve AROM to stated goals, listed in objective measures above, in order to return to maximal functional potential and improve quality of life.  [x] Progressing  [] Met  [] Not Met     STRENGTH: Patient will improve strength to stated goals, listed in objective measures above, in order to improve functional independence and quality of life.  [x] Progressing  [] Met  [] Not Met     GAIT: Patient will demonstrate normalized gait mechanics with minimal compensation in order to return to PLOF. [x] Progressing  [] Met  [] Not Met     Patient will return to normal ADL's, IADL's, community involvement, recreational activities, and work-related activities with less than or equal to 1/10 pain and maximal function.  [x] Progressing  [] Met  [] Not Met     Patient will be able to return to running more than 5 miles without onset of symptoms in anterior hip.  [x] Progressing  [] Met  [] Not Met          Plan     Continue Plan of Care (POC) and progress per patient tolerance. See treatment section for details on planned progressions next session.      Ted Fuentes, PT

## 2023-07-26 ENCOUNTER — CLINICAL SUPPORT (OUTPATIENT)
Dept: REHABILITATION | Facility: HOSPITAL | Age: 33
End: 2023-07-26
Payer: MEDICAID

## 2023-07-26 DIAGNOSIS — M25.552 PAIN IN LEFT HIP: Primary | ICD-10-CM

## 2023-07-26 DIAGNOSIS — M62.552 MUSCLE WASTING AND ATROPHY, NOT ELSEWHERE CLASSIFIED, LEFT THIGH: ICD-10-CM

## 2023-07-26 DIAGNOSIS — M25.652 STIFFNESS OF LEFT HIP JOINT: ICD-10-CM

## 2023-07-26 DIAGNOSIS — Z74.09 DECREASED MOBILITY AND ENDURANCE: ICD-10-CM

## 2023-07-26 PROCEDURE — 97110 THERAPEUTIC EXERCISES: CPT

## 2023-07-26 NOTE — PROGRESS NOTES
[facility]   Physical Therapy Treatment Note        Name: Danisha Jones  Clinic Number: [id number]    Therapy Diagnosis:   Encounter Diagnoses   Name Primary?    Pain in left hip Yes    Stiffness of left hip joint     Decreased mobility and endurance     Muscle wasting and atrophy, not elsewhere classified, left thigh      Physician: Gladys Kessler PA*    Visit Date: 7/26/2023    Physician Orders: PT Eval and Treat  Medical Diagnosis from Referral: Strain of flexor muscle of left hip, subsequent encounter  Evaluation Date: 7/10/2023  Authorization Period Expiration: 10/17/2023  Plan of Care Expiration: 09/04/2023  Progress Note Due: 08/09/2023  Visit # / Visits authorized: 4/20 (+1 evaluation)   FOTO: 1/3 (last performed on 7/10/2023)     Precautions: Standard, anemia  PTA Visit #: 0/5     Time In: 1035  Time Out: 1130  Total Billable Time: 55 minutes (Billing reflects 1 on 1 treatment time spent with patient)    Subjective     Patient reports: he is feeling sore today in hip flexors following previous session but notes it is not his symptoms but rather feels sore as he would expect after a hard workout.     He/She was compliant with home exercise program.  Response to previous treatment: Mild Soreness  Functional change: performing home exercise plan     Pain: 2/10     Location: Left Hip     Objective      Objective Measures updated at progress report or POC update only unless otherwise noted.       Treatment     Danisha received the treatments listed below:     THERAPEUTIC EXERCISES to develop strength, endurance, ROM, flexibility, posture, and core stabilization for (54) minutes including:     Intervention Performed Today     Bike  x 7 minutes   Hip flexion Isometric  x  10x10 second hold   Kneeling Lunge Stretch or Hamilton stretch  x 2x1' bilateral (Hamilton stretch today)   Posterior Pelvic Tilt   10x10 second hold   Deadbugs    2x20    Knee Extensions x X30 with 75#    Hamstring Curls x X30 with 75#   Hip CARs x X10 clock wise and counter clock wise bilateral    Bridges  3x15   Side Lying Hip Abduction  3x15 bilateral    Hip Thrust x 3x10 with 25# KB (up weight next visit)   Monster Walks x 2 laps Blue TB   Side Stepping x 2 laps Blue TB   Single Leg Foot Taps x X15 bilateral, 24 inch step in front and cone behind   Resisted Backwards Walking x X20 with 40#   Eccentric Hip Flexion Off Table  x 2x10 with 10# bilateral    Step Push  2 laps with    Step Ups x X15 bilateral with 20 inch step, pause at top in runner's position                      Plan for Next Visit: Assess irritability of hip flexor and progress strengthening as tolerated.       Patient Education and Home Exercises       Home Exercises Provided and Patient Education Provided     Education provided: time included in treatment time.   PURPOSE: Patient educated on the impairments noted above and the effects of physical therapy intervention to improve overall condition and QOL.   EXERCISE: Patient was educated on all the above exercise prior/during/after for proper posture, positioning, and execution for safe performance with home exercise program.   STRENGTH: Patient educated on the importance of improved core and extremity strength in order to improve alignment of the spine and extremities with static positions and dynamic movement.   POSTURE: Patient educated on postural awareness to reduce stress and maintain optimal alignment of the spine with static positions and dynamic movement     Written Home Exercises Provided: yes.  Exercises were reviewed and Danisha was able to demonstrate them prior to the end of the session.  Danisha demonstrated good  understanding of the education provided. See EMR under Patient Instructions for exercises provided during therapy sessions.    Assessment     Patient tolerated today's session well. Patient presented more sore in hip than typical. Patient exercise workload for hip  flexor reduced to tolerance. Patient progressed with performing step ups onto 20 inch step and maintaining single leg balance at the top of the motion. Patient also progressed with forward and backward walking with banded resistance and tolerated well.     Danisha is progressing well towards his goals.   Patient prognosis is Excellent.     Patient will continue to benefit from skilled outpatient physical therapy to address the deficits listed in the problem list box on initial evaluation, provide pt/family education and to maximize patient's level of independence in the home and community environment.     Patient's spiritual, cultural and educational needs considered and pt agreeable to plan of care and goals.     Anticipated Barriers for therapy: co-morbidities       Short Term Goals:  4 weeks Status  Date Met   PAIN: Pt will report worst pain of 4/10 in order to progress toward max functional ability and improve quality of life. [x] Progressing  [] Met  [] Not Met     FUNCTION: Patient will demonstrate improved function as indicated by a score of greater than or equal to 66 out of 100 on FOTO. [x] Progressing  [] Met  [] Not Met     MOBILITY: Patient will improve AROM to 50% of stated goals, listed in objective measures above, in order to progress towards independence with functional activities.  [x] Progressing  [] Met  [] Not Met     STRENGTH: Patient will improve strength to 50% of stated goals, listed in objective measures above, in order to progress towards independence with functional activities. [x] Progressing  [] Met  [] Not Met     POSTURE: Patient will correct postural deviations in sitting and standing, to decrease pain and promote long term stability.  [x] Progressing  [] Met  [] Not Met     HEP: Patient will demonstrate independence with HEP in order to progress toward functional independence. [x] Progressing  [] Met  [] Not Met     Patient will bel able to return to working out without onset of  anterior hip symptoms.  [x] Progressing  [] Met  [] Not Met        Long Term Goals:  8 weeks Status Date Met   PAIN: Pt will report worst pain of 1/10 in order to progress toward max functional ability and improve quality of life [x] Progressing  [] Met  [] Not Met     FUNCTION: Patient will demonstrate improved function as indicated by a score of greater than or equal to 75 out of 100 on FOTO. [x] Progressing  [] Met  [] Not Met     MOBILITY: Patient will improve AROM to stated goals, listed in objective measures above, in order to return to maximal functional potential and improve quality of life.  [x] Progressing  [] Met  [] Not Met     STRENGTH: Patient will improve strength to stated goals, listed in objective measures above, in order to improve functional independence and quality of life.  [x] Progressing  [] Met  [] Not Met     GAIT: Patient will demonstrate normalized gait mechanics with minimal compensation in order to return to PLOF. [x] Progressing  [] Met  [] Not Met     Patient will return to normal ADL's, IADL's, community involvement, recreational activities, and work-related activities with less than or equal to 1/10 pain and maximal function.  [x] Progressing  [] Met  [] Not Met     Patient will be able to return to running more than 5 miles without onset of symptoms in anterior hip.  [x] Progressing  [] Met  [] Not Met          Plan     Continue Plan of Care (POC) and progress per patient tolerance. See treatment section for details on planned progressions next session.      Ted Fuentes, PT

## 2023-08-02 ENCOUNTER — CLINICAL SUPPORT (OUTPATIENT)
Dept: REHABILITATION | Facility: HOSPITAL | Age: 33
End: 2023-08-02
Payer: MEDICAID

## 2023-08-02 DIAGNOSIS — M25.552 PAIN IN LEFT HIP: Primary | ICD-10-CM

## 2023-08-02 DIAGNOSIS — M25.652 STIFFNESS OF LEFT HIP JOINT: ICD-10-CM

## 2023-08-02 DIAGNOSIS — Z74.09 DECREASED MOBILITY AND ENDURANCE: ICD-10-CM

## 2023-08-02 DIAGNOSIS — M62.552 MUSCLE WASTING AND ATROPHY, NOT ELSEWHERE CLASSIFIED, LEFT THIGH: ICD-10-CM

## 2023-08-02 PROCEDURE — 97110 THERAPEUTIC EXERCISES: CPT

## 2023-08-02 NOTE — PROGRESS NOTES
[facility]   Physical Therapy Treatment Note        Name: Danisha Jones  [facility] Number: [id number]    Therapy Diagnosis:   Encounter Diagnoses   Name Primary?    Pain in left hip Yes    Stiffness of left hip joint     Decreased mobility and endurance     Muscle wasting and atrophy, not elsewhere classified, left thigh      Physician: Gladys Kessler PA*    Visit Date: 8/2/2023    Physician Orders: PT Eval and Treat  Medical Diagnosis from Referral: Strain of flexor muscle of left hip, subsequent encounter  Evaluation Date: 7/10/2023  Authorization Period Expiration: 10/17/2023  Plan of Care Expiration: 09/04/2023  Progress Note Due: 08/09/2023  Visit # / Visits authorized: 5/20 (+1 evaluation)   FOTO: 1/3 (last performed on 7/10/2023)     Precautions: Standard, anemia  PTA Visit #: 0/5     Time In: 0900  Time Out: 0945  Total Billable Time: 45 minutes (Billing reflects 1 on 1 treatment time spent with patient)    Subjective     Patient reports: he had mild muscle soreness in hip following previous session but no increase in symptoms.     He/She was compliant with home exercise program.  Response to previous treatment: Mild Soreness  Functional change: performing home exercise plan     Pain: 2/10     Location: Left Hip     Objective      Objective Measures updated at progress report or POC update only unless otherwise noted.       Treatment     Danisha received the treatments listed below:     THERAPEUTIC EXERCISES to develop strength, endurance, ROM, flexibility, posture, and core stabilization for (44) minutes including:     Intervention Performed Today     Bike  x 7 minutes   Hip flexion Isometric  x  10x10 second hold   Kneeling Lunge Stretch or Hamilton stretch  x 2x1' bilateral (Hamilton stretch today)   Posterior Pelvic Tilt  x 10x10 second hold   Deadbugs  x 3x10    Knee Extensions  X30 with 75#   Hamstring Curls  X30 with 75#   Hip CARs x X10 clock wise and counter clock wise  bilateral    Bridges Progressed 3x15   Side Lying Hip Abduction  3x15 bilateral    Hip Thrust x 3x10 with 30# KB   Monster Walks x 2 laps Blue TB   Side Stepping x 2 laps Blue TB   Single Leg Foot Taps x X15 bilateral, 24 inch step in front and cone behind   Resisted Backwards Walking x X20 with 40#   Eccentric Hip Flexion Off Table   2x10 with 10# bilateral    Step Push  2 laps with [employer] step   Step Ups x X15 bilateral with 20 inch step, pause at top in runner's position                      Plan for Next Visit: Assess irritability of hip flexor and progress strengthening as tolerated.       Patient Education and Home Exercises       Home Exercises Provided and Patient Education Provided     Education provided: time included in treatment time.   PURPOSE: Patient educated on the impairments noted above and the effects of physical therapy intervention to improve overall condition and QOL.   EXERCISE: Patient was educated on all the above exercise prior/during/after for proper posture, positioning, and execution for safe performance with home exercise program.   STRENGTH: Patient educated on the importance of improved core and extremity strength in order to improve alignment of the spine and extremities with static positions and dynamic movement.   POSTURE: Patient educated on postural awareness to reduce stress and maintain optimal alignment of the spine with static positions and dynamic movement     Written Home Exercises Provided: yes.  Exercises were reviewed and Danisha was able to demonstrate them prior to the end of the session.  Danisha demonstrated good  understanding of the education provided. See EMR under Patient Instructions for exercises provided during therapy sessions.    Assessment     Patient tolerated today's session well. Patient demonstrates increased strength with hip thrust as evidenced by increased resistance performed with repetitions. Patient notes minimal discomfort in hip with  exercise other than mild pain in left hip with straight leg raise in supine. Patient notes feeling better when leaving than when getting here.     Danisha is progressing well towards his goals.   Patient prognosis is Excellent.     Patient will continue to benefit from skilled outpatient physical therapy to address the deficits listed in the problem list box on initial evaluation, provide pt/family education and to maximize patient's level of independence in the home and community environment.     Patient's spiritual, cultural and educational needs considered and pt agreeable to plan of care and goals.     Anticipated Barriers for therapy: co-morbidities       Short Term Goals:  4 weeks Status  Date Met   PAIN: Pt will report worst pain of 4/10 in order to progress toward max functional ability and improve quality of life. [x] Progressing  [] Met  [] Not Met     FUNCTION: Patient will demonstrate improved function as indicated by a score of greater than or equal to 66 out of 100 on FOTO. [x] Progressing  [] Met  [] Not Met     MOBILITY: Patient will improve AROM to 50% of stated goals, listed in objective measures above, in order to progress towards independence with functional activities.  [x] Progressing  [] Met  [] Not Met     STRENGTH: Patient will improve strength to 50% of stated goals, listed in objective measures above, in order to progress towards independence with functional activities. [x] Progressing  [] Met  [] Not Met     POSTURE: Patient will correct postural deviations in sitting and standing, to decrease pain and promote long term stability.  [x] Progressing  [] Met  [] Not Met     HEP: Patient will demonstrate independence with HEP in order to progress toward functional independence. [x] Progressing  [] Met  [] Not Met     Patient will bel able to return to working out without onset of anterior hip symptoms.  [x] Progressing  [] Met  [] Not Met        Long Term Goals:  8 weeks Status Date Met    PAIN: Pt will report worst pain of 1/10 in order to progress toward max functional ability and improve quality of life [x] Progressing  [] Met  [] Not Met     FUNCTION: Patient will demonstrate improved function as indicated by a score of greater than or equal to 75 out of 100 on FOTO. [x] Progressing  [] Met  [] Not Met     MOBILITY: Patient will improve AROM to stated goals, listed in objective measures above, in order to return to maximal functional potential and improve quality of life.  [x] Progressing  [] Met  [] Not Met     STRENGTH: Patient will improve strength to stated goals, listed in objective measures above, in order to improve functional independence and quality of life.  [x] Progressing  [] Met  [] Not Met     GAIT: Patient will demonstrate normalized gait mechanics with minimal compensation in order to return to PLOF. [x] Progressing  [] Met  [] Not Met     Patient will return to normal ADL's, IADL's, community involvement, recreational activities, and work-related activities with less than or equal to 1/10 pain and maximal function.  [x] Progressing  [] Met  [] Not Met     Patient will be able to return to running more than 5 miles without onset of symptoms in anterior hip.  [x] Progressing  [] Met  [] Not Met          Plan     Continue Plan of Care (POC) and progress per patient tolerance. See treatment section for details on planned progressions next session.      Ted Fuentes, PT

## 2023-08-08 ENCOUNTER — CLINICAL SUPPORT (OUTPATIENT)
Dept: REHABILITATION | Facility: HOSPITAL | Age: 33
End: 2023-08-08
Payer: MEDICAID

## 2023-08-08 DIAGNOSIS — M25.652 STIFFNESS OF LEFT HIP JOINT: ICD-10-CM

## 2023-08-08 DIAGNOSIS — Z74.09 DECREASED MOBILITY AND ENDURANCE: ICD-10-CM

## 2023-08-08 DIAGNOSIS — M25.552 PAIN IN LEFT HIP: Primary | ICD-10-CM

## 2023-08-08 DIAGNOSIS — M62.552 MUSCLE WASTING AND ATROPHY, NOT ELSEWHERE CLASSIFIED, LEFT THIGH: ICD-10-CM

## 2023-08-08 PROCEDURE — 97110 THERAPEUTIC EXERCISES: CPT

## 2023-08-08 NOTE — PROGRESS NOTES
[facility]   Physical Therapy Treatment Note        Name: Danisha Jones  Clinic Number: [id number]    Therapy Diagnosis:   Encounter Diagnoses   Name Primary?    Pain in left hip Yes    Stiffness of left hip joint     Decreased mobility and endurance     Muscle wasting and atrophy, not elsewhere classified, left thigh      Physician: Gladys Kessler PA*    Visit Date: 8/8/2023    Physician Orders: PT Eval and Treat  Medical Diagnosis from Referral: Strain of flexor muscle of left hip, subsequent encounter  Evaluation Date: 7/10/2023  Authorization Period Expiration: 10/17/2023  Plan of Care Expiration: 09/04/2023  Progress Note Due: 08/09/2023  Visit # / Visits authorized: 6/20 (+1 evaluation)   FOTO: 1/3 (last performed on 7/10/2023)     Precautions: Standard, anemia  PTA Visit #: 0/5     Time In: 8:15 am  Time Out: 9:00 am  Total Billable Time: 45 minutes (Billing reflects 1 on 1 treatment time spent with patient)  Billing reflects [place] medicaid guidelines, billing all therapy as therapeutic-exercise      Subjective     Patient reports: he has to have mild pain in his hip joint area.     He/She was compliant with home exercise program.  Response to previous treatment: Mild Soreness  Functional change: performing home exercise plan     Pain: 2/10     Location: Left Hip     Objective      Objective Measures updated at progress report or POC update only unless otherwise noted.       Treatment     Danisha received the treatments listed below:     THERAPEUTIC EXERCISES to develop strength, endurance, ROM, flexibility, posture, and core stabilization for (40) minutes including:     Intervention Performed Today     Bike  x 7 minutes, L2 upright   Hip flexion Isometric  x  10x10 second hold (increased pain after today)   Kneeling Lunge Stretch or Hamilton stretch  x 2x1' bilateral (Hamilton stretch today)   Posterior Pelvic Tilt  x 10x10 second hold   Deadbugs  x 2x10    Knee  "Extensions  X30 with 75#   Hamstring Curls  X30 with 75#   Hip CARs x X10 clock wise and counter clock wise bilateral (with single hand support)   Bridges Progressed 3x15   Side Lying Hip Abduction  3x15 bilateral    Hip Thrust x 3x10 with 30# KB   Monster Walks x 2 laps Blue TB   Side Stepping x 2 laps Blue TB   Single Leg Foot Taps - X15 bilateral, 24 inch step in front and cone behind   Resisted Backwards Walking - X20 with 40#   Eccentric Hip Flexion Off Table   2x10 with 10# bilateral    Step Push  2 laps with [employer] step   Step Ups x x15 bilateral with 20 inch step, pause at top in runner's position   reverse crunch x   10x   prone hip extension with knee flexed  x  10x/ 2 sets bilaterally         Danisha received the following manual therapy techniques: Joint mobilizations, Myofacial release, Soft tissue Mobilization, and muscle energy techniques were applied to the: bilateral lower quadrant for 5 minutes, including:    Manual Intervention 8/8/2023     Soft Tissue Mobilization     Joint Mobilizations x Hip mobility - inferior and lateral glides with hip mobility belt   Mobilization with movement     Muscle energy techniques  x 2x isometric abduction/adduction "shotgun technique"   Functional Dry Needling            Plan for Next Visit: Assess irritability of hip flexor and progress strengthening as tolerated.       Patient Education and Home Exercises       Home Exercises Provided and Patient Education Provided     Education provided: time included in treatment time.   PURPOSE: Patient educated on the impairments noted above and the effects of physical therapy intervention to improve overall condition and QOL.   EXERCISE: Patient was educated on all the above exercise prior/during/after for proper posture, positioning, and execution for safe performance with home exercise program.   STRENGTH: Patient educated on the importance of improved core and extremity strength in order to improve alignment of " the spine and extremities with static positions and dynamic movement.   POSTURE: Patient educated on postural awareness to reduce stress and maintain optimal alignment of the spine with static positions and dynamic movement     Written Home Exercises Provided: patient instructed to continue with prior issued home exercise program. Verbally issued 1/2 kneeling hip mobility with band and prone hip extension with knee extension if hip starts to feel tight.  Exercises were reviewed and Danisha was able to demonstrate them prior to the end of the session.  Danisha demonstrated good  understanding of the education provided. See EMR under Patient Instructions for exercises provided during therapy sessions.    Assessment     Patient tolerated all treatment well but with some complaints as noted above. Reports increased mobility in hip after joint mobility, decreased pain after muscle energy techniques. Cues throughout treatment in standing for trunk stability with hip mobility. Encouraged home exercise program.    Danisha is progressing well towards his goals.   Patient prognosis is Excellent.     Patient will continue to benefit from skilled outpatient physical therapy to address the deficits listed in the problem list box on initial evaluation, provide pt/family education and to maximize patient's level of independence in the home and community environment.     Patient's spiritual, cultural and educational needs considered and pt agreeable to plan of care and goals.     Anticipated Barriers for therapy: co-morbidities       Short Term Goals:  4 weeks Status  Date Met   PAIN: Pt will report worst pain of 4/10 in order to progress toward max functional ability and improve quality of life. [x] Progressing  [] Met  [] Not Met     FUNCTION: Patient will demonstrate improved function as indicated by a score of greater than or equal to 66 out of 100 on FOTO. [x] Progressing  [] Met  [] Not Met     MOBILITY: Patient will  improve AROM to 50% of stated goals, listed in objective measures above, in order to progress towards independence with functional activities.  [x] Progressing  [] Met  [] Not Met     STRENGTH: Patient will improve strength to 50% of stated goals, listed in objective measures above, in order to progress towards independence with functional activities. [x] Progressing  [] Met  [] Not Met     POSTURE: Patient will correct postural deviations in sitting and standing, to decrease pain and promote long term stability.  [x] Progressing  [] Met  [] Not Met     HEP: Patient will demonstrate independence with HEP in order to progress toward functional independence. [x] Progressing  [] Met  [] Not Met     Patient will bel able to return to working out without onset of anterior hip symptoms.  [x] Progressing  [] Met  [] Not Met        Long Term Goals:  8 weeks Status Date Met   PAIN: Pt will report worst pain of 1/10 in order to progress toward max functional ability and improve quality of life [x] Progressing  [] Met  [] Not Met     FUNCTION: Patient will demonstrate improved function as indicated by a score of greater than or equal to 75 out of 100 on FOTO. [x] Progressing  [] Met  [] Not Met     MOBILITY: Patient will improve AROM to stated goals, listed in objective measures above, in order to return to maximal functional potential and improve quality of life.  [x] Progressing  [] Met  [] Not Met     STRENGTH: Patient will improve strength to stated goals, listed in objective measures above, in order to improve functional independence and quality of life.  [x] Progressing  [] Met  [] Not Met     GAIT: Patient will demonstrate normalized gait mechanics with minimal compensation in order to return to PLOF. [x] Progressing  [] Met  [] Not Met     Patient will return to normal ADL's, IADL's, community involvement, recreational activities, and work-related activities with less than or equal to 1/10 pain and maximal function.   [x] Progressing  [] Met  [] Not Met     Patient will be able to return to running more than 5 miles without onset of symptoms in anterior hip.  [x] Progressing  [] Met  [] Not Met          Plan     Continue Plan of Care (POC) and progress per patient tolerance. See treatment section for details on planned progressions next session.      Evette Marcelino, PT

## 2023-08-10 ENCOUNTER — CLINICAL SUPPORT (OUTPATIENT)
Dept: REHABILITATION | Facility: HOSPITAL | Age: 33
End: 2023-08-10
Payer: MEDICAID

## 2023-08-10 DIAGNOSIS — M25.652 STIFFNESS OF LEFT HIP JOINT: ICD-10-CM

## 2023-08-10 DIAGNOSIS — M25.552 PAIN IN LEFT HIP: Primary | ICD-10-CM

## 2023-08-10 DIAGNOSIS — M62.552 MUSCLE WASTING AND ATROPHY, NOT ELSEWHERE CLASSIFIED, LEFT THIGH: ICD-10-CM

## 2023-08-10 DIAGNOSIS — Z74.09 DECREASED MOBILITY AND ENDURANCE: ICD-10-CM

## 2023-08-10 PROCEDURE — 97110 THERAPEUTIC EXERCISES: CPT

## 2023-08-10 NOTE — PROGRESS NOTES
[facility]   Physical Therapy Treatment Note        Name: Danisha Jones  Clinic Number: [id number]    Therapy Diagnosis:   Encounter Diagnoses   Name Primary?    Pain in left hip Yes    Stiffness of left hip joint     Decreased mobility and endurance     Muscle wasting and atrophy, not elsewhere classified, left thigh      Physician: Gladys Kessler PA*    Visit Date: 8/10/2023    Physician Orders: PT Eval and Treat  Medical Diagnosis from Referral: Strain of flexor muscle of left hip, subsequent encounter  Evaluation Date: 7/10/2023  Authorization Period Expiration: 10/17/2023  Plan of Care Expiration: 09/04/2023  Progress Note Due: 08/09/2023  Visit # / Visits authorized: 6/20 (+1 evaluation)   FOTO: 1/3 (last performed on 7/10/2023)     Precautions: Standard, anemia  PTA Visit #: 0/5     Time In: 9:47 am  Time Out: 10:32 am  Total Billable Time: 45 minutes (Billing reflects 1 on 1 treatment time spent with patient)  Billing reflects [place] medicaid guidelines, billing all therapy as therapeutic-exercise      Subjective     Patient reports: he went for a walk this morning that helped to loosen the hip before today's session. Patient states his pain levels have been lower but can still spike into previous levels of pain.     He/She was compliant with home exercise program.  Response to previous treatment: Mild Soreness  Functional change: performing home exercise plan     Pain: 2/10     Location: Left Hip     Objective      Objective Measures updated at progress report or POC update only unless otherwise noted.     RANGE OF MOTION:   Hip AROM/PROM Right Left Pain/Dysfunction with Movement Goal   Hip Flexion (120º) 120 120 Mild pinch in anterior hip No pain   Hip Extension (30º) 30 30 Stretch on left 30   Hip Abduction (45º)           Hip IR (45º) 30 30       Hip ER (45º) 60 60          STRENGTH:   L/E MMT Right  (spine) Left Pain/Dysfunction with Movement Goal   Hip Flexion   4/5 4-/5 Pain 4+/5 B   Hip Extension  4+/5 4+/5   4+/5 B   Hip Abduction  4+/5 4+/5   4+/5 B   Knee Extension 4+/5 4+/5 Pain 5/5 B   Knee Flexion 4+/5 4+/5   5/5 B   Hip IR 5/5 4/5   4+/5 B   Hip ER 5/5 4+/5   4+/5 B   Ankle DF 5/5 5/5   5/5 B   Ankle PF 5/5 5/5   5/5 B   Ankle Inversion 5/5 5/5   5/5 B   Ankle Eversion 5/5 5/5   5/5 B         MUSCLE LENGTH:   Muscle Tested  Right Left  Limitation Goal   Hip Flexors [] Normal  [x] Limited [] Normal  [x] Limited   Normal B   ITB / TFL [] Normal  [x] Limited [] Normal  [x] Limited   Normal B   Quadriceps [x] Normal  [] Limited [] Normal  [x] Limited   Normal B   Hamstrings  [x] Normal  [] Limited [x] Normal  [] Limited   Normal B            JOINT MOBILITY:   Joint Motion Right Mobility  (spine) Left Mobility Goal   Hip Distraction [] Hypo     [x] Normal     [] Hyper [x] Hypo     [] Normal     [] Hyper Normal    Hip Inferior Glide [] Hypo     [x] Normal     [] Hyper [x] Hypo     [] Normal     [] Hyper Normal    Hip Medial Glide  [] Hypo     [x] Normal     [] Hyper [] Hypo     [x] Normal     [] Hyper Normal    Hip Lateral Glide  [] Hypo     [x] Normal     [] Hyper [] Hypo     [x] Normal     [] Hyper Normal    Knee:  [] Hypo     [x] Normal     [] Hyper [] Hypo     [x] Normal     [] Hyper Normal       SPECIAL TESTS:     Right  (spine) Left  Goal   Straight Leg Raise [] Positive    [x] Negative [x] Positive    [] Negative Negative B    Hip Distraction    Negative Positive     CHAD [] Positive    [x] Negative [] Positive    [x] Negative Negative B             SENSATION  [x] Intact to Light Touch                       [] Impaired:        PALPATION: Muscles: Increased tone and tenderness to palpation of: left , hip flexors. Structures: Increased tenderness to palpation of: left , LOWER STRUCTURES : ASIS       Functional Task:   - Squat: able to perform with good form, slightly excessive anterior tibial translation. Notes some hip pain after repetition.  - Single leg squat:  "able to perform with good control and technique.         Function:      Intake Outcome Measure for FOTO Hip Survey     Therapist reviewed FOTO scores for Danisha on 7/10/2023.   FOTO documents entered into [employer] - see Media section.     Intake Score: 58%         Treatment     Danisha received the treatments listed below:     THERAPEUTIC EXERCISES to develop strength, endurance, ROM, flexibility, posture, and core stabilization for (35) minutes including:     Intervention Performed Today     Bike  x 7 minutes, L2 upright   Hip flexion Isometric  x  10x10 second hold (increased pain after today)   Adductor Side Lunge Stretch x 3x30" bilateral    Kneeling Lunge Stretch or Hamilton stretch  x 2x1' bilateral (Hamilton stretch today)   Posterior Pelvic Tilt  x 10x10 second hold   Deadbugs  x 2x10    Knee Extensions  X30 with 75#   Hamstring Curls  X30 with 75#   Hip CARs x X10 clock wise and counter clock wise bilateral (with single hand support)   Bridges Progressed 3x15   Side Lying Hip Abduction  3x15 bilateral    Hip Thrust x 3x10 with 30# KB   Monster Walks x 2 laps Blue TB   Side Stepping x 2 laps Blue TB   Single Leg Foot Taps  X15 bilateral, 24 inch step in front and cone behind   Resisted Backwards Walking  X20 with 40#   Eccentric Hip Flexion Off Table   2x10 with 10# bilateral    Step Push  2 laps with [employer] step   Step Ups  x15 bilateral with 20 inch step, pause at top in runner's position   Reverse crunch   10x   Prone hip extension with knee flexed x 10x 2 set bilaterally        Objective Testing x Measurements, FOTO, education              Danisha received the following manual therapy techniques: Joint mobilizations, Myofacial release, Soft tissue Mobilization, and muscle energy techniques were applied to the: bilateral lower quadrant for 9 minutes, including:    Manual Intervention 8/10/2023     Soft Tissue Mobilization x Adductors and pectineus    Joint Mobilizations x Hip mobility - inferior and " "lateral glides with hip mobility belt   Mobilization with movement     Muscle energy techniques   2x isometric abduction/adduction "shotgun technique"   Functional Dry Needling            Plan for Next Visit: Assess irritability of hip flexor and progress strengthening as tolerated.       Patient Education and Home Exercises       Home Exercises Provided and Patient Education Provided     Education provided: time included in treatment time.   PURPOSE: Patient educated on the impairments noted above and the effects of physical therapy intervention to improve overall condition and QOL.   EXERCISE: Patient was educated on all the above exercise prior/during/after for proper posture, positioning, and execution for safe performance with home exercise program.   STRENGTH: Patient educated on the importance of improved core and extremity strength in order to improve alignment of the spine and extremities with static positions and dynamic movement.   POSTURE: Patient educated on postural awareness to reduce stress and maintain optimal alignment of the spine with static positions and dynamic movement     Written Home Exercises Provided: patient instructed to continue with prior issued home exercise program. Verbally issued 1/2 kneeling hip mobility with band and prone hip extension with knee extension if hip starts to feel tight.  Exercises were reviewed and Danisha was able to demonstrate them prior to the end of the session.  Danisha demonstrated good  understanding of the education provided. See EMR under Patient Instructions for exercises provided during therapy sessions.    Assessment     Patient demonstrates mild improvements with Physical therapy. Patient exhibit progress with objective testing of lower extremity strength with primary limitation being pain with activation, reported pain levels, hip range of motion into hip extension, and muscle length testing. Patient continues to have limitations in muscle " activation secondary to pain, tenderness to palpation in symptomatic region, and lack of functional improvement shown with FOTO testing. Patient would continue to benefit from skilled Physical therapy in order to address above mentioned deficits and further progress.     Danisha is progressing well towards his goals.   Patient prognosis is Excellent.     Patient will continue to benefit from skilled outpatient physical therapy to address the deficits listed in the problem list box on initial evaluation, provide pt/family education and to maximize patient's level of independence in the home and community environment.     Patient's spiritual, cultural and educational needs considered and pt agreeable to plan of care and goals.     Anticipated Barriers for therapy: co-morbidities       Short Term Goals:  4 weeks Status  Date Met   PAIN: Pt will report worst pain of 4/10 in order to progress toward max functional ability and improve quality of life. [] Progressing  [x] Met  [] Not Met 08/10/2023    FUNCTION: Patient will demonstrate improved function as indicated by a score of greater than or equal to 66 out of 100 on FOTO. [x] Progressing  [] Met  [] Not Met 08/10/2023    MOBILITY: Patient will improve AROM to 50% of stated goals, listed in objective measures above, in order to progress towards independence with functional activities.  [] Progressing  [x] Met  [] Not Met 08/10/2023    STRENGTH: Patient will improve strength to 50% of stated goals, listed in objective measures above, in order to progress towards independence with functional activities. [] Progressing  [x] Met  [] Not Met  08/10/2023   POSTURE: Patient will correct postural deviations in sitting and standing, to decrease pain and promote long term stability.  [] Progressing  [x] Met  [] Not Met  08/10/2023   HEP: Patient will demonstrate independence with HEP in order to progress toward functional independence. [] Progressing  [x] Met  [] Not Met  08/10/2023    Patient will bel able to return to working out without onset of anterior hip symptoms.  [x] Progressing  [] Met  [] Not Met 08/10/2023       Long Term Goals:  8 weeks Status Date Met   PAIN: Pt will report worst pain of 1/10 in order to progress toward max functional ability and improve quality of life [x] Progressing  [] Met  [] Not Met     FUNCTION: Patient will demonstrate improved function as indicated by a score of greater than or equal to 75 out of 100 on FOTO. [x] Progressing  [] Met  [] Not Met     MOBILITY: Patient will improve AROM to stated goals, listed in objective measures above, in order to return to maximal functional potential and improve quality of life.  [x] Progressing  [] Met  [] Not Met     STRENGTH: Patient will improve strength to stated goals, listed in objective measures above, in order to improve functional independence and quality of life.  [x] Progressing  [] Met  [] Not Met     GAIT: Patient will demonstrate normalized gait mechanics with minimal compensation in order to return to PLOF. [x] Progressing  [] Met  [] Not Met     Patient will return to normal ADL's, IADL's, community involvement, recreational activities, and work-related activities with less than or equal to 1/10 pain and maximal function.  [x] Progressing  [] Met  [] Not Met     Patient will be able to return to running more than 5 miles without onset of symptoms in anterior hip.  [x] Progressing  [] Met  [] Not Met          Plan     Continue Plan of Care (POC) and progress per patient tolerance. See treatment section for details on planned progressions next session.      Ted Fuentes, PT

## 2023-08-14 ENCOUNTER — CLINICAL SUPPORT (OUTPATIENT)
Dept: REHABILITATION | Facility: HOSPITAL | Age: 33
End: 2023-08-14
Payer: MEDICAID

## 2023-08-14 DIAGNOSIS — Z74.09 DECREASED MOBILITY AND ENDURANCE: ICD-10-CM

## 2023-08-14 DIAGNOSIS — M25.652 STIFFNESS OF LEFT HIP JOINT: ICD-10-CM

## 2023-08-14 DIAGNOSIS — M25.552 PAIN IN LEFT HIP: Primary | ICD-10-CM

## 2023-08-14 DIAGNOSIS — M62.552 MUSCLE WASTING AND ATROPHY, NOT ELSEWHERE CLASSIFIED, LEFT THIGH: ICD-10-CM

## 2023-08-14 PROCEDURE — 97110 THERAPEUTIC EXERCISES: CPT

## 2023-08-14 NOTE — PROGRESS NOTES
[facility]   Physical Therapy Treatment Note        Name: Danisha Jones  Clinic Number: [id number]    Therapy Diagnosis:   Encounter Diagnoses   Name Primary?    Pain in left hip Yes    Stiffness of left hip joint     Decreased mobility and endurance     Muscle wasting and atrophy, not elsewhere classified, left thigh      Physician: Gladys Kessler PA*    Visit Date: 8/14/2023    Physician Orders: PT Eval and Treat  Medical Diagnosis from Referral: Strain of flexor muscle of left hip, subsequent encounter  Evaluation Date: 7/10/2023  Authorization Period Expiration: 10/17/2023  Plan of Care Expiration: 09/04/2023  Progress Note Due: 09/04/2023  Visit # / Visits authorized: 8/20 (+1 evaluation)   FOTO: 1/3 (last performed on 7/10/2023)     Precautions: Standard, anemia  PTA Visit #: 0/5     Time In: 10:25 am  Time Out: 11:15 am  Total Billable Time: 50 minutes (Billing reflects 1 on 1 treatment time spent with patient)  Billing reflects [place] medicaid guidelines, billing all therapy as therapeutic-exercise      Subjective     Patient reports: he has been feeling pretty good over the weekend with a little pinch in the hip today. Patient expresses desire to attempt trigger point dry needling for anterior hip pain in today's session.     He/She was compliant with home exercise program.  Response to previous treatment: Mild Soreness  Functional change: performing home exercise plan     Pain: 2/10     Location: Left Hip     Objective      Objective Measures updated at progress report or POC update only unless otherwise noted.     Treatment     Danisha received the treatments listed below:     THERAPEUTIC EXERCISES to develop strength, endurance, ROM, flexibility, posture, and core stabilization for (30) minutes including:     Intervention Performed Today     Bike x 7 minutes, L2 upright   Hip flexion Isometric x 20x5 second hold (increased pain after today)   Adductor Isometric x  "20x5 second hold hook lying    Adductor Side Lunge Stretch x 3x30" bilateral    Kneeling Lunge Stretch or Hamilton stretch x 2x1' bilateral (Hamilton stretch today)   Posterior Pelvic Tilt   10x10 second hold   Deadbugs  2x10   Knee Extensions  X30 with 75#   Hamstring Curls  X30 with 75#   Hip CARs x X10 clock wise and counter clock wise bilateral (with single hand support)   Bridges Progressed 3x15   Side Lying Hip Adduction x X20 bilateral    Side Lying Hip Abduction  3x15 bilateral    Hip Thrust  3x10 with 30# KB   Monster Walks x 2 laps Blue TB   Side Stepping x 2 laps Blue TB   Single Leg Foot Taps  X15 bilateral, 24 inch step in front and cone behind   Resisted Backwards Walking  X20 with 40#   Eccentric Hip Flexion Off Table   2x10 with 10# bilateral    Step Push  2 laps with [employer] step   Step Ups  x15 bilateral with 20 inch step, pause at top in runner's position   Reverse crunch   10x   Prone hip extension with knee flexed x 10x 2 set bilaterally        Objective Testing  Measurements, FOTO, education              Danisha received the following manual therapy techniques: Joint mobilizations, Myofacial release, Soft tissue Mobilization, and muscle energy techniques were applied to the: bilateral lower quadrant for 9 minutes, including:    See EMR under MEDIA for written consent provided.    Palpation Assessment to determine the necessity for Functional Dry Needling  adductor brevis and adductor longus.     Manual Intervention 8/14/2023     Soft Tissue Mobilization x Adductors and pectineus    Joint Mobilizations  Hip mobility - inferior and lateral glides with hip mobility belt   Mobilization with movement     Muscle energy techniques   2x isometric abduction/adduction "shotgun technique"   Functional Dry Needling  x Adductor Brevis/Longus       Plan for Next Visit: Assess irritability of hip flexor and progress strengthening as tolerated.         Patient Education and Home Exercises       Home " Exercises Provided and Patient Education Provided     Education provided: time included in treatment time.   PURPOSE: Patient educated on the impairments noted above and the effects of physical therapy intervention to improve overall condition and QOL.   EXERCISE: Patient was educated on all the above exercise prior/during/after for proper posture, positioning, and execution for safe performance with home exercise program.   STRENGTH: Patient educated on the importance of improved core and extremity strength in order to improve alignment of the spine and extremities with static positions and dynamic movement.   POSTURE: Patient educated on postural awareness to reduce stress and maintain optimal alignment of the spine with static positions and dynamic movement     Written Home Exercises Provided: patient instructed to continue with prior issued home exercise program. Verbally issued 1/2 kneeling hip mobility with band and prone hip extension with knee extension if hip starts to feel tight.  Exercises were reviewed and [place] was able to demonstrate them prior to the end of the session.  Danisha demonstrated good  understanding of the education provided. See EMR under Patient Instructions for exercises provided during therapy sessions.    Assessment     Patient tolerated today's treatment well. Patient educated on risk, precautions, and expectations of dry needling. Dry needling consent form review with patient, signed, and uploaded into patient media documents. Patient tolerated dry needling of adductor longus and brevis well. Patient notes reduction of symptoms following. Patient session follow up with stretching and strengthening of involved musculature without increase in pain or soreness. Patient demonstrated appropriate response to Functional Dry Needling.     Danisha is progressing well towards his goals.   Patient prognosis is Excellent.     Patient will continue to benefit from skilled outpatient  physical therapy to address the deficits listed in the problem list box on initial evaluation, provide pt/family education and to maximize patient's level of independence in the home and community environment.     Patient's spiritual, cultural and educational needs considered and pt agreeable to plan of care and goals.     Anticipated Barriers for therapy: co-morbidities       Short Term Goals:  4 weeks Status  Date Met   PAIN: Pt will report worst pain of 4/10 in order to progress toward max functional ability and improve quality of life. [] Progressing  [x] Met  [] Not Met 08/10/2023    FUNCTION: Patient will demonstrate improved function as indicated by a score of greater than or equal to 66 out of 100 on FOTO. [x] Progressing  [] Met  [] Not Met 08/10/2023    MOBILITY: Patient will improve AROM to 50% of stated goals, listed in objective measures above, in order to progress towards independence with functional activities.  [] Progressing  [x] Met  [] Not Met 08/10/2023    STRENGTH: Patient will improve strength to 50% of stated goals, listed in objective measures above, in order to progress towards independence with functional activities. [] Progressing  [x] Met  [] Not Met  08/10/2023   POSTURE: Patient will correct postural deviations in sitting and standing, to decrease pain and promote long term stability.  [] Progressing  [x] Met  [] Not Met  08/10/2023   HEP: Patient will demonstrate independence with HEP in order to progress toward functional independence. [] Progressing  [x] Met  [] Not Met 08/10/2023    Patient will bel able to return to working out without onset of anterior hip symptoms.  [x] Progressing  [] Met  [] Not Met 08/10/2023       Long Term Goals:  8 weeks Status Date Met   PAIN: Pt will report worst pain of 1/10 in order to progress toward max functional ability and improve quality of life [x] Progressing  [] Met  [] Not Met     FUNCTION: Patient will demonstrate improved function as  indicated by a score of greater than or equal to 75 out of 100 on FOTO. [x] Progressing  [] Met  [] Not Met     MOBILITY: Patient will improve AROM to stated goals, listed in objective measures above, in order to return to maximal functional potential and improve quality of life.  [x] Progressing  [] Met  [] Not Met     STRENGTH: Patient will improve strength to stated goals, listed in objective measures above, in order to improve functional independence and quality of life.  [x] Progressing  [] Met  [] Not Met     GAIT: Patient will demonstrate normalized gait mechanics with minimal compensation in order to return to PLOF. [x] Progressing  [] Met  [] Not Met     Patient will return to normal ADL's, IADL's, community involvement, recreational activities, and work-related activities with less than or equal to 1/10 pain and maximal function.  [x] Progressing  [] Met  [] Not Met     Patient will be able to return to running more than 5 miles without onset of symptoms in anterior hip.  [x] Progressing  [] Met  [] Not Met          Plan     Continue Plan of Care (POC) and progress per patient tolerance. See treatment section for details on planned progressions next session.      Ted Fuentes, PT

## 2023-08-16 ENCOUNTER — CLINICAL SUPPORT (OUTPATIENT)
Dept: REHABILITATION | Facility: HOSPITAL | Age: 33
End: 2023-08-16
Payer: MEDICAID

## 2023-08-16 DIAGNOSIS — Z74.09 DECREASED MOBILITY AND ENDURANCE: ICD-10-CM

## 2023-08-16 DIAGNOSIS — M25.652 STIFFNESS OF LEFT HIP JOINT: ICD-10-CM

## 2023-08-16 DIAGNOSIS — M25.552 PAIN IN LEFT HIP: Primary | ICD-10-CM

## 2023-08-16 DIAGNOSIS — M62.552 MUSCLE WASTING AND ATROPHY, NOT ELSEWHERE CLASSIFIED, LEFT THIGH: ICD-10-CM

## 2023-08-16 PROCEDURE — 97110 THERAPEUTIC EXERCISES: CPT

## 2023-08-16 NOTE — PROGRESS NOTES
[facility]   Physical Therapy Treatment Note        Name: Danisha Jones  Clinic Number: [id number]    Therapy Diagnosis:   Encounter Diagnoses   Name Primary?    Pain in left hip Yes    Stiffness of left hip joint     Decreased mobility and endurance     Muscle wasting and atrophy, not elsewhere classified, left thigh      Physician: Gladys Kessler PA*    Visit Date: 8/16/2023    Physician Orders: PT Eval and Treat  Medical Diagnosis from Referral: Strain of flexor muscle of left hip, subsequent encounter  Evaluation Date: 7/10/2023  Authorization Period Expiration: 10/17/2023  Plan of Care Expiration: 09/04/2023  Progress Note Due: 09/04/2023  Visit # / Visits authorized: 9/20 (+1 evaluation)   FOTO: 1/3 (last performed on 7/10/2023)     Precautions: Standard, anemia  PTA Visit #: 0/5     Time In: 10:32 am  Time Out: 11:27 am  Total Billable Time: 55 minutes (Billing reflects 1 on 1 treatment time spent with patient)  Billing reflects [place] medicaid guidelines, billing all therapy as therapeutic-exercise      Subjective     Patient reports: after last session of dry needling he was mildly sore but after soreness reduced he was able to walk more comfortably without pain. Patient notes he continues to have pain when lifting leg.     He/She was compliant with home exercise program.  Response to previous treatment: Mild Soreness  Functional change: able to ambulate without pain.     Pain: 2/10     Location: Left Hip     Objective      Objective Measures updated at progress report or POC update only unless otherwise noted.     Treatment     Danisha received the treatments listed below:     THERAPEUTIC EXERCISES to develop strength, endurance, ROM, flexibility, posture, and core stabilization for (54) minutes including:     Intervention Performed Today     Bike x 7 minutes, L2 upright   Hip flexion Isometric x 20x5 second hold (increased pain after today)   Adductor Isometric x  "20x5 second hold hook lying    Adductor Side Lunge Stretch x 3x30" bilateral    Kneeling Lunge Stretch or Hamilton stretch x 2x1' bilateral (Hamilton stretch today)   Posterior Pelvic Tilt   10x10 second hold   Deadbugs x 3x10   Knee Extensions  X30 with 75#   Hamstring Curls  X30 with 75#   Hip CARs x X10 clock wise and counter clock wise bilateral (with single hand support)   Bridges Progressed 3x15   Side Lying Hip Adduction  X20 bilateral    Side Lying Hip Abduction  3x15 bilateral    Hip Thrust x 3x10 with 30# KB   Monster Walks x 2 laps Blue TB   Side Stepping x 2 laps Blue TB   Single Leg Foot Taps  X15 bilateral, 24 inch step in front and cone behind   Resisted Backwards Walking  X20 with 40#   Eccentric Hip Flexion Off Table  x 2x10 with 10# bilateral    Step Push  2 laps with [employer] step   Step Ups  x15 bilateral with 20 inch step, pause at top in runner's position   Reverse crunch   10x   Prone hip extension with knee flexed x 10x 2 set bilaterally        Objective Testing  Measurements, FOTO, education              Danisha received the following manual therapy techniques: Joint mobilizations, Myofacial release, Soft tissue Mobilization, and muscle energy techniques were applied to the: bilateral lower quadrant for 0 minutes, including:      Manual Intervention 8/16/2023     Soft Tissue Mobilization  Adductors and pectineus    Joint Mobilizations  Hip mobility - inferior and lateral glides with hip mobility belt   Mobilization with movement     Muscle energy techniques   2x isometric abduction/adduction "shotgun technique"   Functional Dry Needling   Adductor Brevis/Longus       Plan for Next Visit: Assess irritability of hip flexor and progress strengthening as tolerated.         Patient Education and Home Exercises       Home Exercises Provided and Patient Education Provided     Education provided: time included in treatment time.   PURPOSE: Patient educated on the impairments noted above and the " effects of physical therapy intervention to improve overall condition and QOL.   EXERCISE: Patient was educated on all the above exercise prior/during/after for proper posture, positioning, and execution for safe performance with home exercise program.   STRENGTH: Patient educated on the importance of improved core and extremity strength in order to improve alignment of the spine and extremities with static positions and dynamic movement.   POSTURE: Patient educated on postural awareness to reduce stress and maintain optimal alignment of the spine with static positions and dynamic movement     Written Home Exercises Provided: patient instructed to continue with prior issued home exercise program. Verbally issued 1/2 kneeling hip mobility with band and prone hip extension with knee extension if hip starts to feel tight.  Exercises were reviewed and Jahryley was able to demonstrate them prior to the end of the session.  Jahryley demonstrated good  understanding of the education provided. See EMR under Patient Instructions for exercises provided during therapy sessions.    Assessment     Patient tolerated today's session well. Patient able to perform eccentric hip flexion with resistance bilaterally without causing increase in symptoms. Patient progressed with core strengthening by increasing sets of repetitions with rest in between. Patient notes exercise more tolerated following functional dry needling in previous session.     Danisha is progressing well towards his goals.   Patient prognosis is Excellent.     Patient will continue to benefit from skilled outpatient physical therapy to address the deficits listed in the problem list box on initial evaluation, provide pt/family education and to maximize patient's level of independence in the home and community environment.     Patient's spiritual, cultural and educational needs considered and pt agreeable to plan of care and goals.     Anticipated Barriers for  therapy: co-morbidities       Short Term Goals:  4 weeks Status  Date Met   PAIN: Pt will report worst pain of 4/10 in order to progress toward max functional ability and improve quality of life. [] Progressing  [x] Met  [] Not Met 08/10/2023    FUNCTION: Patient will demonstrate improved function as indicated by a score of greater than or equal to 66 out of 100 on FOTO. [x] Progressing  [] Met  [] Not Met 08/10/2023    MOBILITY: Patient will improve AROM to 50% of stated goals, listed in objective measures above, in order to progress towards independence with functional activities.  [] Progressing  [x] Met  [] Not Met 08/10/2023    STRENGTH: Patient will improve strength to 50% of stated goals, listed in objective measures above, in order to progress towards independence with functional activities. [] Progressing  [x] Met  [] Not Met  08/10/2023   POSTURE: Patient will correct postural deviations in sitting and standing, to decrease pain and promote long term stability.  [] Progressing  [x] Met  [] Not Met  08/10/2023   HEP: Patient will demonstrate independence with HEP in order to progress toward functional independence. [] Progressing  [x] Met  [] Not Met 08/10/2023    Patient will bel able to return to working out without onset of anterior hip symptoms.  [x] Progressing  [] Met  [] Not Met 08/10/2023       Long Term Goals:  8 weeks Status Date Met   PAIN: Pt will report worst pain of 1/10 in order to progress toward max functional ability and improve quality of life [x] Progressing  [] Met  [] Not Met     FUNCTION: Patient will demonstrate improved function as indicated by a score of greater than or equal to 75 out of 100 on FOTO. [x] Progressing  [] Met  [] Not Met     MOBILITY: Patient will improve AROM to stated goals, listed in objective measures above, in order to return to maximal functional potential and improve quality of life.  [x] Progressing  [] Met  [] Not Met     STRENGTH: Patient will improve  strength to stated goals, listed in objective measures above, in order to improve functional independence and quality of life.  [x] Progressing  [] Met  [] Not Met     GAIT: Patient will demonstrate normalized gait mechanics with minimal compensation in order to return to PLOF. [x] Progressing  [] Met  [] Not Met     Patient will return to normal ADL's, IADL's, community involvement, recreational activities, and work-related activities with less than or equal to 1/10 pain and maximal function.  [x] Progressing  [] Met  [] Not Met     Patient will be able to return to running more than 5 miles without onset of symptoms in anterior hip.  [x] Progressing  [] Met  [] Not Met          Plan     Continue Plan of Care (POC) and progress per patient tolerance. See treatment section for details on planned progressions next session.      Ted Fuentes, PT

## 2023-08-21 ENCOUNTER — CLINICAL SUPPORT (OUTPATIENT)
Dept: REHABILITATION | Facility: HOSPITAL | Age: 33
End: 2023-08-21
Payer: MEDICAID

## 2023-08-21 DIAGNOSIS — Z74.09 DECREASED MOBILITY AND ENDURANCE: ICD-10-CM

## 2023-08-21 DIAGNOSIS — M25.552 PAIN IN LEFT HIP: Primary | ICD-10-CM

## 2023-08-21 DIAGNOSIS — M25.652 STIFFNESS OF LEFT HIP JOINT: ICD-10-CM

## 2023-08-21 DIAGNOSIS — M62.552 MUSCLE WASTING AND ATROPHY, NOT ELSEWHERE CLASSIFIED, LEFT THIGH: ICD-10-CM

## 2023-08-21 PROCEDURE — 97110 THERAPEUTIC EXERCISES: CPT

## 2023-08-21 NOTE — PROGRESS NOTES
[facility]   Physical Therapy Treatment Note        Name: Danisha Jones  Clinic Number: [id number]    Therapy Diagnosis:   Encounter Diagnoses   Name Primary?    Pain in left hip Yes    Stiffness of left hip joint     Decreased mobility and endurance     Muscle wasting and atrophy, not elsewhere classified, left thigh      Physician: Gladys Kessler PA*    Visit Date: 8/21/2023    Physician Orders: PT Eval and Treat  Medical Diagnosis from Referral: Strain of flexor muscle of left hip, subsequent encounter  Evaluation Date: 7/10/2023  Authorization Period Expiration: 10/17/2023  Plan of Care Expiration: 09/04/2023  Progress Note Due: 09/04/2023  Visit # / Visits authorized: 10/20 (+1 evaluation)   FOTO: 1/3 (last performed on 7/10/2023)     Precautions: Standard, anemia  PTA Visit #: 0/5     Time In: 10:31 am  Time Out: 11:20 am  Total Billable Time: 49 minutes (Billing reflects 1 on 1 treatment time spent with patient)  Billing reflects [place] medicaid guidelines, billing all therapy as therapeutic-exercise      Subjective     Patient reports: he was able to go on 2 mile run this morning with no pain during the run but mild pain after the run when going up the stairs.     He/She was compliant with home exercise program.  Response to previous treatment: Mild Soreness  Functional change: able to ambulate without pain.     Pain: 2/10     Location: Left Hip     Objective      Objective Measures updated at progress report or POC update only unless otherwise noted.     Treatment     Danisha received the treatments listed below:     THERAPEUTIC EXERCISES to develop strength, endurance, ROM, flexibility, posture, and core stabilization for (48) minutes including:     Intervention Performed Today     Bike x 7 minutes, L2 upright   Hip flexion Isometric  20x5 second hold (increased pain after today)   Adductor Isometric  20x5 second hold hook lying    Adductor Side Lunge Stretch x  "3x30" bilateral    Kneeling Lunge Stretch or Hamilton stretch x 2x1' bilateral (Hamilton stretch today)   Posterior Pelvic Tilt  progressed 10x10 second hold   Deadbugs x 3x10   Knee Extensions  X30 with 75#   Hamstring Curls  X30 with 75#   Hip CARs x X10 clock wise and counter clock wise bilateral (with single hand support)   Bridges Progressed 3x15   Side Lying Hip Adduction x X30 bilateral    Side Lying Hip Abduction x 3x10 bilateral    Hip Thrust  3x10 with 30# KB   Monster Walks  2 laps Blue TB   Side Stepping  2 laps Blue TB   Single Leg Foot Taps  X15 bilateral, 24 inch step in front and cone behind   Resisted Backwards Walking x X20 with 40#   Eccentric Hip Flexion Off Table  x 2x10 with 10# bilateral    Step Push  2 laps with [employer] step   Step Ups  x15 bilateral with 20 inch step, pause at top in runner's position   Reverse crunch   10x   Prone hip extension with knee flexed x 10x 2 set bilaterally with 10#   Backwards Walking Lunges x X2 laps of 10 lunges down and back             Objective Testing  Measurements, FOTO, education              Danisha received the following manual therapy techniques: Joint mobilizations, Myofacial release, Soft tissue Mobilization, and muscle energy techniques were applied to the: bilateral lower quadrant for 0 minutes, including:      Manual Intervention 8/21/2023     Soft Tissue Mobilization  Adductors and pectineus    Joint Mobilizations  Hip mobility - inferior and lateral glides with hip mobility belt   Mobilization with movement     Muscle energy techniques   2x isometric abduction/adduction "shotgun technique"   Functional Dry Needling   Adductor Brevis/Longus       Plan for Next Visit: Assess irritability of hip flexor and progress strengthening as tolerated.         Patient Education and Home Exercises       Home Exercises Provided and Patient Education Provided     Education provided: time included in treatment time.   PURPOSE: Patient educated on the " impairments noted above and the effects of physical therapy intervention to improve overall condition and QOL.   EXERCISE: Patient was educated on all the above exercise prior/during/after for proper posture, positioning, and execution for safe performance with home exercise program.   STRENGTH: Patient educated on the importance of improved core and extremity strength in order to improve alignment of the spine and extremities with static positions and dynamic movement.   POSTURE: Patient educated on postural awareness to reduce stress and maintain optimal alignment of the spine with static positions and dynamic movement     Written Home Exercises Provided: patient instructed to continue with prior issued home exercise program. Verbally issued 1/2 kneeling hip mobility with band and prone hip extension with knee extension if hip starts to feel tight.  Exercises were reviewed and Danisha was able to demonstrate them prior to the end of the session.  Danisha demonstrated good  understanding of the education provided. See EMR under Patient Instructions for exercises provided during therapy sessions.    Assessment     Patient tolerated today's session well. Patient increased with prone hip extension resistance and able to continue to demonstrate full range of motion and good glute max contraction. Patient functional exercise increased with performing backwards walking lunges. Patient notes fatigue but no increase in symptoms.     Danisha is progressing well towards his goals.   Patient prognosis is Excellent.     Patient will continue to benefit from skilled outpatient physical therapy to address the deficits listed in the problem list box on initial evaluation, provide pt/family education and to maximize patient's level of independence in the home and community environment.     Patient's spiritual, cultural and educational needs considered and pt agreeable to plan of care and goals.     Anticipated Barriers for  therapy: co-morbidities       Short Term Goals:  4 weeks Status  Date Met   PAIN: Pt will report worst pain of 4/10 in order to progress toward max functional ability and improve quality of life. [] Progressing  [x] Met  [] Not Met 08/10/2023    FUNCTION: Patient will demonstrate improved function as indicated by a score of greater than or equal to 66 out of 100 on FOTO. [x] Progressing  [] Met  [] Not Met 08/10/2023    MOBILITY: Patient will improve AROM to 50% of stated goals, listed in objective measures above, in order to progress towards independence with functional activities.  [] Progressing  [x] Met  [] Not Met 08/10/2023    STRENGTH: Patient will improve strength to 50% of stated goals, listed in objective measures above, in order to progress towards independence with functional activities. [] Progressing  [x] Met  [] Not Met  08/10/2023   POSTURE: Patient will correct postural deviations in sitting and standing, to decrease pain and promote long term stability.  [] Progressing  [x] Met  [] Not Met  08/10/2023   HEP: Patient will demonstrate independence with HEP in order to progress toward functional independence. [] Progressing  [x] Met  [] Not Met 08/10/2023    Patient will bel able to return to working out without onset of anterior hip symptoms.  [x] Progressing  [] Met  [] Not Met 08/10/2023       Long Term Goals:  8 weeks Status Date Met   PAIN: Pt will report worst pain of 1/10 in order to progress toward max functional ability and improve quality of life [x] Progressing  [] Met  [] Not Met     FUNCTION: Patient will demonstrate improved function as indicated by a score of greater than or equal to 75 out of 100 on FOTO. [x] Progressing  [] Met  [] Not Met     MOBILITY: Patient will improve AROM to stated goals, listed in objective measures above, in order to return to maximal functional potential and improve quality of life.  [x] Progressing  [] Met  [] Not Met     STRENGTH: Patient will improve  strength to stated goals, listed in objective measures above, in order to improve functional independence and quality of life.  [x] Progressing  [] Met  [] Not Met     GAIT: Patient will demonstrate normalized gait mechanics with minimal compensation in order to return to PLOF. [x] Progressing  [] Met  [] Not Met     Patient will return to normal ADL's, IADL's, community involvement, recreational activities, and work-related activities with less than or equal to 1/10 pain and maximal function.  [x] Progressing  [] Met  [] Not Met     Patient will be able to return to running more than 5 miles without onset of symptoms in anterior hip.  [x] Progressing  [] Met  [] Not Met          Plan     Continue Plan of Care (POC) and progress per patient tolerance. See treatment section for details on planned progressions next session.      Ted Fuentes, PT

## 2023-08-23 ENCOUNTER — CLINICAL SUPPORT (OUTPATIENT)
Dept: REHABILITATION | Facility: HOSPITAL | Age: 33
End: 2023-08-23
Payer: MEDICAID

## 2023-08-23 DIAGNOSIS — M25.552 PAIN IN LEFT HIP: Primary | ICD-10-CM

## 2023-08-23 DIAGNOSIS — Z74.09 DECREASED MOBILITY AND ENDURANCE: ICD-10-CM

## 2023-08-23 DIAGNOSIS — M25.652 STIFFNESS OF LEFT HIP JOINT: ICD-10-CM

## 2023-08-23 DIAGNOSIS — M62.552 MUSCLE WASTING AND ATROPHY, NOT ELSEWHERE CLASSIFIED, LEFT THIGH: ICD-10-CM

## 2023-08-23 PROCEDURE — 97110 THERAPEUTIC EXERCISES: CPT

## 2023-08-23 NOTE — PROGRESS NOTES
"  [facility]   Physical Therapy Treatment Note        Name: Danisha Jones  Clinic Number: [id number]    Therapy Diagnosis:   Encounter Diagnoses   Name Primary?    Pain in left hip Yes    Stiffness of left hip joint     Decreased mobility and endurance     Muscle wasting and atrophy, not elsewhere classified, left thigh      Physician: Gladys Kessler PA*    Visit Date: 8/23/2023    Physician Orders: PT Eval and Treat  Medical Diagnosis from Referral: Strain of flexor muscle of left hip, subsequent encounter  Evaluation Date: 7/10/2023  Authorization Period Expiration: 10/17/2023  Plan of Care Expiration: 09/04/2023  Progress Note Due: 09/04/2023  Visit # / Visits authorized: 11/20 (+1 evaluation)   FOTO: 1/3 (last performed on 7/10/2023)     Precautions: Standard, anemia  PTA Visit #: 0/5     Time In: 10:32 am  Time Out: 11:18 am  Total Billable Time: 46 minutes (Billing reflects 1 on 1 treatment time spent with patient)  Billing reflects [place] medicaid guidelines, billing all therapy as therapeutic-exercise      Subjective     Patient reports: he was able to go on 2 mile run again this morning this minimal discomfort during or after run.     He/She was compliant with home exercise program.  Response to previous treatment: Mild Soreness  Functional change: able to ambulate without pain.     Pain: 2/10     Location: Left Hip     Objective      Objective Measures updated at progress report or POC update only unless otherwise noted.     Treatment     Danisha received the treatments listed below:     THERAPEUTIC EXERCISES to develop strength, endurance, ROM, flexibility, posture, and core stabilization for (45) minutes including:     Intervention Performed Today     Bike x 7 minutes, L2 upright   Hip flexion Isometric  20x5 second hold (increased pain after today)   Adductor Isometric  20x5 second hold hook lying    Adductor Side Lunge Stretch x 3x30" bilateral    Kneeling Lunge " "Stretch or Hamilton stretch x 2x1' bilateral (Hamilton stretch today)   Posterior Pelvic Tilt  progressed 10x10 second hold   Deadbugs  3x10   Knee Extensions  X30 with 75#   Hamstring Curls  X30 with 75#   Hip CARs  X10 clock wise and counter clock wise bilateral (with single hand support)   Bridges Progressed 3x15   Side Lying Hip Adduction x X30 bilateral    Side Lying Hip Abduction x 3x10 bilateral    Hip Thrust x 3x10 with 45# bar   Monster Walks  2 laps Blue TB   Side Stepping  2 laps Blue TB   Single Leg Foot Taps  X15 bilateral, 24 inch step in front and cone behind   Resisted Backwards Walking x X20 with 42.5#   Eccentric Hip Flexion Off Table  x 2x10 with 10# bilateral    Step Push  2 laps with [employer] step   Step Ups  x15 bilateral with 20 inch step, pause at top in runner's position   Reverse crunch   10x   Prone hip extension with knee flexed  10x 2 set bilaterally with 10#   Backwards Walking Lunges x X2 laps of 10 lunges down and back   Hip flexion temper tantrums x 3x20"                  Objective Testing  Measurements, FOTO, education              Danisha received the following manual therapy techniques: Joint mobilizations, Myofacial release, Soft tissue Mobilization, and muscle energy techniques were applied to the: bilateral lower quadrant for 0 minutes, including:      Manual Intervention 8/23/2023     Soft Tissue Mobilization  Adductors and pectineus    Joint Mobilizations  Hip mobility - inferior and lateral glides with hip mobility belt   Mobilization with movement     Muscle energy techniques   2x isometric abduction/adduction "shotgun technique"   Functional Dry Needling   Adductor Brevis/Longus       Plan for Next Visit: Assess irritability of hip flexor and progress strengthening as tolerated.         Patient Education and Home Exercises       Home Exercises Provided and Patient Education Provided     Education provided: time included in treatment time.   PURPOSE: Patient educated on " the impairments noted above and the effects of physical therapy intervention to improve overall condition and QOL.   EXERCISE: Patient was educated on all the above exercise prior/during/after for proper posture, positioning, and execution for safe performance with home exercise program.   STRENGTH: Patient educated on the importance of improved core and extremity strength in order to improve alignment of the spine and extremities with static positions and dynamic movement.   POSTURE: Patient educated on postural awareness to reduce stress and maintain optimal alignment of the spine with static positions and dynamic movement     Written Home Exercises Provided: patient instructed to continue with prior issued home exercise program. Verbally issued 1/2 kneeling hip mobility with band and prone hip extension with knee extension if hip starts to feel tight.  Exercises were reviewed and Danisha was able to demonstrate them prior to the end of the session.  Danisha demonstrated good  understanding of the education provided. See EMR under Patient Instructions for exercises provided during therapy sessions.    Assessment     Patient tolerated today's session well. Patient progressed with hip extension strengthening by increasing weight with hip thrust. Patient requires more rest with increased weight but able to maintain increased range of motion. Patient exercise incorporated hip flexion plyometrics with resistance bands and while heavily fatigues, does not cause increased pain.     Danisha is progressing well towards his goals.   Patient prognosis is Excellent.     Patient will continue to benefit from skilled outpatient physical therapy to address the deficits listed in the problem list box on initial evaluation, provide pt/family education and to maximize patient's level of independence in the home and community environment.     Patient's spiritual, cultural and educational needs considered and pt agreeable to  plan of care and goals.     Anticipated Barriers for therapy: co-morbidities       Short Term Goals:  4 weeks Status  Date Met   PAIN: Pt will report worst pain of 4/10 in order to progress toward max functional ability and improve quality of life. [] Progressing  [x] Met  [] Not Met 08/10/2023    FUNCTION: Patient will demonstrate improved function as indicated by a score of greater than or equal to 66 out of 100 on FOTO. [x] Progressing  [] Met  [] Not Met 08/10/2023    MOBILITY: Patient will improve AROM to 50% of stated goals, listed in objective measures above, in order to progress towards independence with functional activities.  [] Progressing  [x] Met  [] Not Met 08/10/2023    STRENGTH: Patient will improve strength to 50% of stated goals, listed in objective measures above, in order to progress towards independence with functional activities. [] Progressing  [x] Met  [] Not Met  08/10/2023   POSTURE: Patient will correct postural deviations in sitting and standing, to decrease pain and promote long term stability.  [] Progressing  [x] Met  [] Not Met  08/10/2023   HEP: Patient will demonstrate independence with HEP in order to progress toward functional independence. [] Progressing  [x] Met  [] Not Met 08/10/2023    Patient will bel able to return to working out without onset of anterior hip symptoms.  [x] Progressing  [] Met  [] Not Met 08/10/2023       Long Term Goals:  8 weeks Status Date Met   PAIN: Pt will report worst pain of 1/10 in order to progress toward max functional ability and improve quality of life [x] Progressing  [] Met  [] Not Met     FUNCTION: Patient will demonstrate improved function as indicated by a score of greater than or equal to 75 out of 100 on FOTO. [x] Progressing  [] Met  [] Not Met     MOBILITY: Patient will improve AROM to stated goals, listed in objective measures above, in order to return to maximal functional potential and improve quality of life.  [x] Progressing  []  Met  [] Not Met     STRENGTH: Patient will improve strength to stated goals, listed in objective measures above, in order to improve functional independence and quality of life.  [x] Progressing  [] Met  [] Not Met     GAIT: Patient will demonstrate normalized gait mechanics with minimal compensation in order to return to PLOF. [x] Progressing  [] Met  [] Not Met     Patient will return to normal ADL's, IADL's, community involvement, recreational activities, and work-related activities with less than or equal to 1/10 pain and maximal function.  [x] Progressing  [] Met  [] Not Met     Patient will be able to return to running more than 5 miles without onset of symptoms in anterior hip.  [x] Progressing  [] Met  [] Not Met          Plan     Continue Plan of Care (POC) and progress per patient tolerance. See treatment section for details on planned progressions next session.      Ted Fuentes, PT

## 2023-09-02 ENCOUNTER — PATIENT MESSAGE (OUTPATIENT)
Dept: REHABILITATION | Facility: HOSPITAL | Age: 33
End: 2023-09-02
Payer: MEDICAID

## 2023-09-11 ENCOUNTER — DOCUMENTATION ONLY (OUTPATIENT)
Dept: REHABILITATION | Facility: HOSPITAL | Age: 33
End: 2023-09-11

## 2023-10-13 ENCOUNTER — DOCUMENTATION ONLY (OUTPATIENT)
Dept: REHABILITATION | Facility: HOSPITAL | Age: 33
End: 2023-10-13
Payer: MEDICAID

## 2023-12-27 ENCOUNTER — DOCUMENTATION ONLY (OUTPATIENT)
Dept: REHABILITATION | Facility: HOSPITAL | Age: 33
End: 2023-12-27
Payer: MEDICAID

## 2023-12-27 DIAGNOSIS — M62.552 MUSCLE WASTING AND ATROPHY, NOT ELSEWHERE CLASSIFIED, LEFT THIGH: ICD-10-CM

## 2023-12-27 DIAGNOSIS — M25.652 STIFFNESS OF LEFT HIP JOINT: ICD-10-CM

## 2023-12-27 DIAGNOSIS — M25.552 PAIN IN LEFT HIP: Primary | ICD-10-CM

## 2023-12-27 DIAGNOSIS — Z74.09 DECREASED MOBILITY AND ENDURANCE: ICD-10-CM

## 2023-12-27 NOTE — PROGRESS NOTES
[facility]  Physical Therapy Discharge Note    Name: Danisha Jones  [facility] Number: [id number]    Therapy Diagnosis:   Encounter Diagnoses   Name Primary?    Pain in left hip Yes    Stiffness of left hip joint     Decreased mobility and endurance     Muscle wasting and atrophy, not elsewhere classified, left thigh      Physician: Gladys Kessler PA*    Physician Orders: PT Eval and Treat  Medical Diagnosis from Referral: Strain of flexor muscle of left hip, subsequent encounter  Evaluation Date: 7/10/2023      Date of Last visit: 08/23/2023  Total Visits Received: 11    ASSESSMENT      See daily note    Discharge reason: Patient has not attended therapy since 08/23/2024    Discharge FOTO Score: see daily note    Goals: see daily note    PLAN   This patient is discharged from Physical Therapy      Ted Fuentes, PT

## 2024-02-21 DIAGNOSIS — R03.0 ELEVATED BLOOD PRESSURE, SITUATIONAL: ICD-10-CM

## 2024-03-14 ENCOUNTER — DOCUMENTATION ONLY (OUTPATIENT)
Dept: PULMONOLOGY | Facility: CLINIC | Age: 34
End: 2024-03-14
Payer: COMMERCIAL

## 2024-03-14 NOTE — PROGRESS NOTES
Received denial from insurance on up coming feno testing. Testing 3/15 feno canceled.     Dr. Hawa Rankin,     Thank you for ordering PFT77 - FRACTION OF  NITRIC OXIDE (FENO) for patient Danisha Jones, MRN [id number]. Unfortunately, the patient's insurance, [facility] [employer], has denied the service due to Facility/POS Not in Patient's Network, N/A. This is an automated In Basket notification that does not require a reply. For a more detailed explanation, or for questions regarding this insurance denial, send an In Basket message to the Pre Service Intake pool or call the [facility] Pre[facility] department at [phone] and reference referral ID [id number].The Pre-Service department hours are M-F 8 a.m. to 5 p.m.       Thank you,     [facility]

## 2024-04-03 ENCOUNTER — PATIENT MESSAGE (OUTPATIENT)
Dept: PULMONOLOGY | Facility: CLINIC | Age: 34
End: 2024-04-03
Payer: COMMERCIAL

## 2024-09-27 ENCOUNTER — PATIENT MESSAGE (OUTPATIENT)
Dept: INTERNAL MEDICINE | Facility: CLINIC | Age: 34
End: 2024-09-27
Payer: COMMERCIAL

## 2025-02-20 NOTE — Clinical Note
Head, normocephalic, atraumatic, Face, Face within normal limits, Ears, External ears within normal limits, Nose/Nasopharynx, External nose normal appearance, nares patent, no nasal discharge, Lips, Appearance normal This patient has testing ann/chest xray tomorrow, but appointment following Tuesday. Can you arrange for him to be seen tomorrow or Tuesday with same day testing and appt. Thank you